# Patient Record
Sex: FEMALE | Race: WHITE | NOT HISPANIC OR LATINO | Employment: OTHER | URBAN - NONMETROPOLITAN AREA
[De-identification: names, ages, dates, MRNs, and addresses within clinical notes are randomized per-mention and may not be internally consistent; named-entity substitution may affect disease eponyms.]

---

## 2017-04-27 ENCOUNTER — APPOINTMENT (OUTPATIENT)
Dept: ULTRASOUND IMAGING | Facility: HOSPITAL | Age: 57
End: 2017-04-27
Attending: SURGERY

## 2017-04-27 ENCOUNTER — HOSPITAL ENCOUNTER (OUTPATIENT)
Dept: ULTRASOUND IMAGING | Facility: HOSPITAL | Age: 57
Discharge: HOME | End: 2017-04-27
Attending: SURGERY

## 2017-05-09 ENCOUNTER — OFFICE VISIT (OUTPATIENT)
Dept: GASTROENTEROLOGY | Facility: CLINIC | Age: 57
End: 2017-05-09

## 2017-05-09 VITALS
OXYGEN SATURATION: 99 % | HEIGHT: 69 IN | DIASTOLIC BLOOD PRESSURE: 78 MMHG | HEART RATE: 86 BPM | TEMPERATURE: 97.5 F | WEIGHT: 204 LBS | BODY MASS INDEX: 30.21 KG/M2 | SYSTOLIC BLOOD PRESSURE: 132 MMHG

## 2017-05-09 DIAGNOSIS — K59.09 CHRONIC CONSTIPATION: Primary | ICD-10-CM

## 2017-05-09 DIAGNOSIS — R10.13 EPIGASTRIC PAIN: ICD-10-CM

## 2017-05-09 DIAGNOSIS — D68.9 COAGULOPATHY (HCC): ICD-10-CM

## 2017-05-09 DIAGNOSIS — Z86.010 HX OF COLONIC POLYP: ICD-10-CM

## 2017-05-09 DIAGNOSIS — R12 HEARTBURN: ICD-10-CM

## 2017-05-09 DIAGNOSIS — Z80.0 FAMILY HX OF COLON CANCER: ICD-10-CM

## 2017-05-09 PROBLEM — Z86.0100 HX OF COLONIC POLYP: Status: ACTIVE | Noted: 2017-05-09

## 2017-05-09 PROCEDURE — 99204 OFFICE O/P NEW MOD 45 MIN: CPT | Performed by: NURSE PRACTITIONER

## 2017-05-10 ENCOUNTER — TELEPHONE (OUTPATIENT)
Dept: GASTROENTEROLOGY | Facility: CLINIC | Age: 57
End: 2017-05-10

## 2017-05-16 ENCOUNTER — OFFICE VISIT (OUTPATIENT)
Dept: VASCULAR SURGERY | Facility: CLINIC | Age: 57
End: 2017-05-16

## 2017-05-16 ENCOUNTER — HOSPITAL ENCOUNTER (OUTPATIENT)
Dept: ULTRASOUND IMAGING | Facility: HOSPITAL | Age: 57
Discharge: HOME | End: 2017-05-16
Attending: SURGERY | Admitting: SURGERY

## 2017-05-16 ENCOUNTER — HOSPITAL ENCOUNTER (OUTPATIENT)
Dept: ULTRASOUND IMAGING | Facility: HOSPITAL | Age: 57
Discharge: HOME | End: 2017-05-16
Attending: SURGERY

## 2017-05-16 VITALS
SYSTOLIC BLOOD PRESSURE: 112 MMHG | WEIGHT: 204 LBS | HEIGHT: 69 IN | DIASTOLIC BLOOD PRESSURE: 76 MMHG | HEART RATE: 96 BPM | BODY MASS INDEX: 30.21 KG/M2

## 2017-05-16 DIAGNOSIS — I73.9 PAD (PERIPHERAL ARTERY DISEASE) (HCC): Primary | ICD-10-CM

## 2017-05-16 DIAGNOSIS — I65.23 BILATERAL CAROTID ARTERY STENOSIS: ICD-10-CM

## 2017-05-16 PROCEDURE — 93880 EXTRACRANIAL BILAT STUDY: CPT

## 2017-05-16 PROCEDURE — 93880 EXTRACRANIAL BILAT STUDY: CPT | Performed by: SURGERY

## 2017-05-16 PROCEDURE — 99214 OFFICE O/P EST MOD 30 MIN: CPT | Performed by: NURSE PRACTITIONER

## 2017-05-16 PROCEDURE — 93924 LWR XTR VASC STDY BILAT: CPT | Performed by: SURGERY

## 2017-05-16 PROCEDURE — 93923 UPR/LXTR ART STDY 3+ LVLS: CPT

## 2017-05-16 RX ORDER — CILOSTAZOL 100 MG/1
100 TABLET ORAL 2 TIMES DAILY
COMMUNITY

## 2017-06-16 ENCOUNTER — ANESTHESIA EVENT (OUTPATIENT)
Dept: GASTROENTEROLOGY | Facility: HOSPITAL | Age: 57
End: 2017-06-16

## 2017-06-19 ENCOUNTER — ANESTHESIA (OUTPATIENT)
Dept: GASTROENTEROLOGY | Facility: HOSPITAL | Age: 57
End: 2017-06-19

## 2017-06-19 ENCOUNTER — HOSPITAL ENCOUNTER (OUTPATIENT)
Facility: HOSPITAL | Age: 57
Setting detail: HOSPITAL OUTPATIENT SURGERY
Discharge: HOME | End: 2017-06-19
Attending: INTERNAL MEDICINE | Admitting: INTERNAL MEDICINE

## 2017-06-19 VITALS
DIASTOLIC BLOOD PRESSURE: 71 MMHG | HEART RATE: 75 BPM | OXYGEN SATURATION: 100 % | WEIGHT: 198 LBS | RESPIRATION RATE: 20 BRPM | TEMPERATURE: 97.2 F | HEIGHT: 70 IN | BODY MASS INDEX: 28.35 KG/M2 | SYSTOLIC BLOOD PRESSURE: 136 MMHG

## 2017-06-19 DIAGNOSIS — K59.09 CHRONIC CONSTIPATION: ICD-10-CM

## 2017-06-19 DIAGNOSIS — Z86.010 HX OF COLONIC POLYP: ICD-10-CM

## 2017-06-19 DIAGNOSIS — Z80.0 FAMILY HX OF COLON CANCER: ICD-10-CM

## 2017-06-19 PROCEDURE — 25010000002 PROPOFOL 10 MG/ML EMULSION: Performed by: NURSE ANESTHETIST, CERTIFIED REGISTERED

## 2017-06-19 PROCEDURE — 43239 EGD BIOPSY SINGLE/MULTIPLE: CPT | Performed by: INTERNAL MEDICINE

## 2017-06-19 PROCEDURE — 45385 COLONOSCOPY W/LESION REMOVAL: CPT | Performed by: INTERNAL MEDICINE

## 2017-06-19 PROCEDURE — 87081 CULTURE SCREEN ONLY: CPT | Performed by: INTERNAL MEDICINE

## 2017-06-19 PROCEDURE — 88305 TISSUE EXAM BY PATHOLOGIST: CPT | Performed by: INTERNAL MEDICINE

## 2017-06-19 RX ORDER — ONDANSETRON 2 MG/ML
4 INJECTION INTRAMUSCULAR; INTRAVENOUS ONCE AS NEEDED
Status: DISCONTINUED | OUTPATIENT
Start: 2017-06-19 | End: 2017-06-19 | Stop reason: HOSPADM

## 2017-06-19 RX ORDER — SODIUM CHLORIDE 9 MG/ML
9 INJECTION, SOLUTION INTRAVENOUS CONTINUOUS PRN
Status: DISCONTINUED | OUTPATIENT
Start: 2017-06-19 | End: 2017-06-19 | Stop reason: HOSPADM

## 2017-06-19 RX ORDER — SODIUM CHLORIDE 0.9 % (FLUSH) 0.9 %
1-10 SYRINGE (ML) INJECTION AS NEEDED
Status: CANCELLED | OUTPATIENT
Start: 2017-06-19

## 2017-06-19 RX ORDER — LIDOCAINE HYDROCHLORIDE 20 MG/ML
INJECTION, SOLUTION INFILTRATION; PERINEURAL AS NEEDED
Status: DISCONTINUED | OUTPATIENT
Start: 2017-06-19 | End: 2017-06-19 | Stop reason: SURG

## 2017-06-19 RX ORDER — SODIUM CHLORIDE 9 MG/ML
100 INJECTION, SOLUTION INTRAVENOUS CONTINUOUS
Status: CANCELLED | OUTPATIENT
Start: 2017-06-19

## 2017-06-19 RX ORDER — SODIUM CHLORIDE 0.9 % (FLUSH) 0.9 %
1-10 SYRINGE (ML) INJECTION AS NEEDED
Status: DISCONTINUED | OUTPATIENT
Start: 2017-06-19 | End: 2017-06-19 | Stop reason: HOSPADM

## 2017-06-19 RX ORDER — PROPOFOL 10 MG/ML
VIAL (ML) INTRAVENOUS AS NEEDED
Status: DISCONTINUED | OUTPATIENT
Start: 2017-06-19 | End: 2017-06-19 | Stop reason: SURG

## 2017-06-19 RX ADMIN — LIDOCAINE HYDROCHLORIDE 60 MG: 20 INJECTION, SOLUTION INFILTRATION; PERINEURAL at 09:18

## 2017-06-19 RX ADMIN — PROPOFOL 30 MG: 10 INJECTION, EMULSION INTRAVENOUS at 09:43

## 2017-06-19 RX ADMIN — SODIUM CHLORIDE 9 ML/HR: 9 INJECTION, SOLUTION INTRAVENOUS at 08:39

## 2017-06-19 RX ADMIN — PROPOFOL 30 MG: 10 INJECTION, EMULSION INTRAVENOUS at 09:24

## 2017-06-19 RX ADMIN — PROPOFOL 30 MG: 10 INJECTION, EMULSION INTRAVENOUS at 09:46

## 2017-06-19 RX ADMIN — PROPOFOL 30 MG: 10 INJECTION, EMULSION INTRAVENOUS at 09:26

## 2017-06-19 RX ADMIN — PROPOFOL 30 MG: 10 INJECTION, EMULSION INTRAVENOUS at 09:33

## 2017-06-19 RX ADMIN — PROPOFOL 50 MG: 10 INJECTION, EMULSION INTRAVENOUS at 09:19

## 2017-06-19 RX ADMIN — PROPOFOL 30 MG: 10 INJECTION, EMULSION INTRAVENOUS at 09:40

## 2017-06-19 RX ADMIN — PROPOFOL 30 MG: 10 INJECTION, EMULSION INTRAVENOUS at 09:29

## 2017-06-19 RX ADMIN — PROPOFOL 30 MG: 10 INJECTION, EMULSION INTRAVENOUS at 09:36

## 2017-06-19 RX ADMIN — PROPOFOL 30 MG: 10 INJECTION, EMULSION INTRAVENOUS at 09:22

## 2017-06-19 NOTE — PLAN OF CARE
Problem: Patient Care Overview (Adult)  Goal: Adult Individualization and Mutuality    06/19/17 0825   Individualization   Patient Specific Preferences none   Patient Specific Goals none   Patient Specific Interventions none   Mutuality/Individual Preferences   What Anxieties, Fears or Concerns Do You Have About Your Health or Care? none   What Questions Do You Have About Your Health or Care? none   What Information Would Help Us Give You More Personalized Care? none

## 2017-06-19 NOTE — ANESTHESIA POSTPROCEDURE EVALUATION
Patient: Tabitha Gonzalez    Procedure Summary     Date Anesthesia Start Anesthesia Stop Room / Location    06/19/17 0913 0950 [facility] ENDOSCOPY 5 / [facility]       Procedure Diagnosis Surgeon Provider    COLONOSCOPY WITH ANESTHESIA (N/A ); ESOPHAGOGASTRODUODENOSCOPY WITH ANESTHESIA (N/A Esophagus) Chronic constipation; Family hx of colon cancer; Hx of colonic polyp  (Chronic constipation [K59.09]; Family hx of colon cancer [Z80.0]; Hx of colonic polyp [Z86.010]) MD Malcom Lloyd CRNA          Anesthesia Type: general  Last vitals  BP      Temp      Pulse     Resp      SpO2        Post Anesthesia Care and Evaluation    Patient location during evaluation: PHASE II  Patient participation: complete - patient participated  Level of consciousness: awake and alert  Pain score: 0  Pain management: adequate  Airway patency: patent  Anesthetic complications: No anesthetic complications  PONV Status: none  Cardiovascular status: acceptable and stable  Respiratory status: acceptable and unassisted  Hydration status: acceptable

## 2017-06-19 NOTE — PLAN OF CARE
Problem: GI Endoscopy (Adult)  Goal: Signs and Symptoms of Listed Potential Problems Will be Absent or Manageable (GI Endoscopy)  Outcome: Outcome(s) achieved Date Met:  06/19/17

## 2017-06-19 NOTE — PLAN OF CARE
Problem: Patient Care Overview (Adult)  Goal: Plan of Care Review  Outcome: Outcome(s) achieved Date Met:  06/19/17 06/19/17 1017   Patient Care Overview   Progress progress toward functional goals as expected   Outcome Evaluation   Outcome Summary/Follow up Plan d/c criteria met   Coping/Psychosocial Response Interventions   Plan Of Care Reviewed With patient;spouse

## 2017-06-19 NOTE — H&P
[facility]  Pre Procedure History & Physical    Chief Complaint:   Colon polyps    Subjective     HPI:   The patient has a history of colon polyps who presents for exam.  She also has been having heartburn along with epigastric burning.    Past Medical History:   Past Medical History:   Diagnosis Date   • Colon polyp    • COPD (chronic obstructive pulmonary disease)    • DVT (deep venous thrombosis)    • GERD (gastroesophageal reflux disease)    • Hypercholesterolemia    • Hypertension    • IBS (irritable bowel syndrome)    • Migraine    • Peripheral artery disease    • PONV (postoperative nausea and vomiting)    • PVD (peripheral vascular disease)        Past Surgical History:  [unfilled]    Family History:  Family History   Problem Relation Age of Onset   • Rectal cancer Sister    • Colon polyps Sister      in her 40 's    • Colon cancer Sister      rectal cancer in her 50 's   • Stomach cancer Brother    • Coronary artery disease Mother    • Cancer Father      lung       Social History:   reports that she quit smoking about 3 years ago. She has never used smokeless tobacco. She reports that she drinks alcohol. She reports that she does not use illicit drugs.    Medications:   Prior to Admission medications    Medication Sig Start Date End Date Taking? Authorizing Provider   Metoprolol Succinate (TOPROL XL PO) Take  by mouth.   Yes Historical Provider, MD   atorvastatin (LIPITOR) 20 MG tablet Take 20 mg by mouth daily.    Historical Provider, MD   cilostazol (PLETAL) 100 MG tablet Take 100 mg by mouth 2 (Two) Times a Day.    Historical Provider, MD   rivaroxaban (XARELTO) 20 MG tablet Take 20 mg by mouth daily.    Historical Provider, MD   verapamil SR (CALAN-SR) 240 MG CR tablet Take 240 mg by mouth every night.    Historical Provider, MD   folic acid-pyridoxine-cyanocobalamin (FOLBIC) 2.5-25-2 MG tablet tablet Take  by mouth daily.  6/19/17  Historical Provider, MD       Allergies:  Codeine;  "Contrast dye; and Sulfa antibiotics    Objective     Blood pressure 140/80, pulse 75, temperature 97.2 °F (36.2 °C), temperature source Temporal Artery , resp. rate 20, height 70\" (177.8 cm), weight 198 lb (89.8 kg), SpO2 98 %.    Physical Exam   Constitutional: Pt is oriented to person, place, and in no distress.   HENT: Mouth/Throat: Oropharynx is clear.   Cardiovascular: Normal rate, regular rhythm.    Pulmonary/Chest: Effort normal. No respiratory distress. No  wheezes.   Abdominal: Soft. Non-distended.  Skin: Skin is warm and dry.   Psychiatric: Mood, memory, affect and judgment appear normal.     Assessment/Plan     Diagnosis:  Colon polyps epigastric burning, heartburn     Anticipated Surgical Procedure:  Colonoscopy  Endoscopy  The risks, benefits, and alternatives of this procedure have been discussed with the patient or the responsible party- the patient understands and agrees to proceed.      EMR Dragon/transcription disclaimer:  Much of this encounter note is electronic transcription/translation of spoken language to printed text.  The electronic translation of spoken language may be erroneous, or at times, nonsensical words or phrases may be inadvertently transcribed.  Although I have reviewed the note for such errors, some may still exist.  "

## 2017-06-19 NOTE — ANESTHESIA POSTPROCEDURE EVALUATION
"Patient: Tabitha Gonzalez    Procedure Summary     Date Anesthesia Start Anesthesia Stop Room / Location    06/19/17 0913 0950  [facility] ENDOSCOPY 5 / [facility]       Procedure Diagnosis Surgeon Provider    COLONOSCOPY WITH ANESTHESIA (N/A ); ESOPHAGOGASTRODUODENOSCOPY WITH ANESTHESIA (N/A Esophagus) Chronic constipation; Family hx of colon cancer; Hx of colonic polyp  (Chronic constipation [K59.09]; Family hx of colon cancer [Z80.0]; Hx of colonic polyp [Z86.010]) MD Malcom Lloyd, ZAN          Anesthesia Type: general  Last vitals  /71 (06/19/17 1025)    Temp      Pulse 75 (06/19/17 1025)   Resp 20 (06/19/17 1025)    SpO2 100 % (06/19/17 1025)      Post Anesthesia Care and Evaluation    Patient location during evaluation: PHASE II  Patient participation: complete - patient participated  Level of consciousness: awake and alert  Pain management: adequate  Airway patency: patent  Anesthetic complications: No anesthetic complications    Cardiovascular status: acceptable  Respiratory status: acceptable  Hydration status: acceptable    Comments: Blood pressure 136/71, pulse 75, temperature 97.2 °F (36.2 °C), temperature source Temporal Artery , resp. rate 20, height 70\" (177.8 cm), weight 198 lb (89.8 kg), SpO2 100 %.      "

## 2017-06-20 LAB
CYTO UR: NORMAL
LAB AP CASE REPORT: NORMAL
LAB AP CLINICAL INFORMATION: NORMAL
Lab: NORMAL
PATH REPORT.FINAL DX SPEC: NORMAL
PATH REPORT.GROSS SPEC: NORMAL
UREASE TISS QL: NEGATIVE

## 2018-05-16 ENCOUNTER — OFFICE VISIT (OUTPATIENT)
Dept: VASCULAR SURGERY | Facility: CLINIC | Age: 58
End: 2018-05-16

## 2018-05-16 ENCOUNTER — HOSPITAL ENCOUNTER (OUTPATIENT)
Dept: ULTRASOUND IMAGING | Facility: HOSPITAL | Age: 58
Discharge: HOME | End: 2018-05-16

## 2018-05-16 ENCOUNTER — HOSPITAL ENCOUNTER (OUTPATIENT)
Dept: ULTRASOUND IMAGING | Facility: HOSPITAL | Age: 58
Discharge: HOME | End: 2018-05-16
Admitting: NURSE PRACTITIONER

## 2018-05-16 VITALS
DIASTOLIC BLOOD PRESSURE: 76 MMHG | OXYGEN SATURATION: 98 % | BODY MASS INDEX: 30.96 KG/M2 | WEIGHT: 209 LBS | HEART RATE: 93 BPM | HEIGHT: 69 IN | SYSTOLIC BLOOD PRESSURE: 124 MMHG

## 2018-05-16 DIAGNOSIS — I73.9 PAD (PERIPHERAL ARTERY DISEASE) (HCC): Primary | ICD-10-CM

## 2018-05-16 DIAGNOSIS — I73.9 PAD (PERIPHERAL ARTERY DISEASE) (HCC): ICD-10-CM

## 2018-05-16 DIAGNOSIS — I65.23 BILATERAL CAROTID ARTERY STENOSIS: ICD-10-CM

## 2018-05-16 PROCEDURE — 99213 OFFICE O/P EST LOW 20 MIN: CPT | Performed by: NURSE PRACTITIONER

## 2018-05-16 PROCEDURE — 93880 EXTRACRANIAL BILAT STUDY: CPT | Performed by: SURGERY

## 2018-05-16 PROCEDURE — 93924 LWR XTR VASC STDY BILAT: CPT | Performed by: SURGERY

## 2018-05-16 PROCEDURE — 93923 UPR/LXTR ART STDY 3+ LVLS: CPT

## 2018-05-16 PROCEDURE — 93880 EXTRACRANIAL BILAT STUDY: CPT

## 2018-05-16 NOTE — PROGRESS NOTES
"05/16/2018       Nikolai Treadwell MD  [address]  [place] [address]        Tabitha Gonzalez  1960    Chief Complaint   Patient presents with   • Follow-up     1 Year Follow Up. Test 05/16/18 US pad carotid bilateral and US pad ankle / brach ind ext comp. Patient denies any stroke like symptoms.        Dear Nikolai Treadwell MD:    HPI     I had the pleasure of seeing you patient in the office today for follow up.  As you recall, the patient is a 58 y.o. female who has a longstanding history of PAD.  She has undergone previous aortobifemoral bypass with bilateral lower extremity stenting.  She does complain of some tiredness to her lower extremities, however no classic claudication.  She does have a history of chronic back pain with bulging discs.  She denies any strokelike symptoms.  She did have noninvasive testing performed today, which I did review in office.    /76 (BP Location: Right arm, Patient Position: Sitting)   Pulse 93   Ht 175.3 cm (69\")   Wt 94.8 kg (209 lb)   SpO2 98%   BMI 30.86 kg/m²   Physical Exam   Constitutional: She is oriented to person, place, and time. She appears well-developed and well-nourished. No distress.   HENT:   Head: Normocephalic and atraumatic.   Mouth/Throat: Oropharynx is clear and moist.   Eyes: Pupils are equal, round, and reactive to light. No scleral icterus.   Neck: Normal range of motion. Neck supple. No JVD present. Carotid bruit is not present. No thyromegaly present.   Cardiovascular: Normal rate, regular rhythm, S2 normal, normal heart sounds and intact distal pulses.  Exam reveals no gallop and no friction rub.    No murmur heard.  Pulses:       Femoral pulses are 2+ on the right side, and 2+ on the left side.       Popliteal pulses are 2+ on the right side, and 2+ on the left side.        Dorsalis pedis pulses are 2+ on the right side, and 2+ on the left side.        Posterior tibial pulses are 0 on the right side, and 2+ on the left side. "   Right: doppler PT   Pulmonary/Chest: Effort normal and breath sounds normal.   Abdominal: Soft. Normal aorta and bowel sounds are normal. There is no hepatosplenomegaly.   Musculoskeletal: Normal range of motion.   Neurological: She is alert and oriented to person, place, and time. No cranial nerve deficit.   Skin: Skin is warm and dry. She is not diaphoretic.   Psychiatric: She has a normal mood and affect. Her behavior is normal. Judgment and thought content normal.   Nursing note and vitals reviewed.      DIAGNOSTIC DATA: Noninvasive testing including a carotid duplex shows less than 50% stenosis bilaterally with bilateral antegrade vertebral flow.      No results found.     Patient Active Problem List   Diagnosis   • COPD (chronic obstructive pulmonary disease)   • Hx of colonic polyp   • Chronic constipation   • Family hx of colon cancer   • Epigastric pain   • Peripheral artery disease   • Hypertension   • Hypercholesterolemia   • DVT (deep venous thrombosis)   • BMI 30.0-30.9,adult         ICD-10-CM ICD-9-CM   1. PAD (peripheral artery disease) I73.9 443.9   2. Bilateral carotid artery stenosis I65.23 433.10     433.30   3. BMI 30.0-30.9,adult Z68.30 V85.30       PLAN: After thoroughly evaluating Tabitha Gonzalez, I believe the best course of action is to remain conservative from a vascular standpoint.  We will see Tabitha Gonzalez back in 1 year with repeat noninvasive testing for continued surveillance, including ABIs and a carotid dupplex  I did discuss vascular risk factors as they pertain to the progression of vascular disease including controlling her hypertension and hyperlipidemia.  Body mass index is 30.86 kg/m². Follow up with PCP regarding abnormal BMI.  The patient is to continue taking their medications as previously discussed.   This was all discussed in full with complete understanding.  Thank you for allowing me to participate in the care of your patient.  Please do not hesitate to call  with any questions or concerns.  We will keep you aware of any further encounters with Tabitha Gonzalez.      Sincerely Yours,        SHYLA Pal

## 2019-02-25 ENCOUNTER — LAB REQUISITION (OUTPATIENT)
Dept: LAB | Facility: HOSPITAL | Age: 59
End: 2019-02-25

## 2019-02-25 DIAGNOSIS — Z00.00 ENCOUNTER FOR GENERAL ADULT MEDICAL EXAMINATION WITHOUT ABNORMAL FINDINGS: ICD-10-CM

## 2019-02-25 PROCEDURE — 87015 SPECIMEN INFECT AGNT CONCNTJ: CPT | Performed by: OPHTHALMOLOGY

## 2019-02-25 PROCEDURE — 87205 SMEAR GRAM STAIN: CPT | Performed by: OPHTHALMOLOGY

## 2019-02-25 PROCEDURE — 87070 CULTURE OTHR SPECIMN AEROBIC: CPT | Performed by: OPHTHALMOLOGY

## 2019-02-28 LAB
BACTERIA FLD CULT: NORMAL
GRAM STN SPEC: NORMAL

## 2019-04-18 ENCOUNTER — TELEPHONE (OUTPATIENT)
Dept: VASCULAR SURGERY | Facility: CLINIC | Age: 59
End: 2019-04-18

## 2019-04-18 NOTE — TELEPHONE ENCOUNTER
Left message reminding Mrs Gonzalez of her appointments for Friday, April 19th, 2019. Reminded Mrs Gonzalez to arrive at the [facility] at 730 am for testing and follow up afterwards at 10 am with Carin MANSFIELD. Also advised if she had any questions or needed to reschedule to please call the office at [phone].

## 2019-04-19 ENCOUNTER — HOSPITAL ENCOUNTER (OUTPATIENT)
Dept: ULTRASOUND IMAGING | Facility: HOSPITAL | Age: 59
Discharge: HOME | End: 2019-04-19
Admitting: NURSE PRACTITIONER

## 2019-04-19 ENCOUNTER — OFFICE VISIT (OUTPATIENT)
Dept: VASCULAR SURGERY | Facility: CLINIC | Age: 59
End: 2019-04-19

## 2019-04-19 ENCOUNTER — HOSPITAL ENCOUNTER (OUTPATIENT)
Dept: ULTRASOUND IMAGING | Facility: HOSPITAL | Age: 59
Discharge: HOME | End: 2019-04-19

## 2019-04-19 VITALS
SYSTOLIC BLOOD PRESSURE: 136 MMHG | DIASTOLIC BLOOD PRESSURE: 84 MMHG | HEIGHT: 69 IN | BODY MASS INDEX: 28.14 KG/M2 | WEIGHT: 190 LBS | OXYGEN SATURATION: 97 % | HEART RATE: 78 BPM

## 2019-04-19 DIAGNOSIS — I73.9 PAD (PERIPHERAL ARTERY DISEASE) (HCC): ICD-10-CM

## 2019-04-19 DIAGNOSIS — I65.23 BILATERAL CAROTID ARTERY STENOSIS: ICD-10-CM

## 2019-04-19 DIAGNOSIS — E78.00 HYPERCHOLESTEROLEMIA: ICD-10-CM

## 2019-04-19 DIAGNOSIS — I10 ESSENTIAL HYPERTENSION: ICD-10-CM

## 2019-04-19 DIAGNOSIS — I73.9 PAD (PERIPHERAL ARTERY DISEASE) (HCC): Primary | ICD-10-CM

## 2019-04-19 PROCEDURE — 93924 LWR XTR VASC STDY BILAT: CPT | Performed by: SURGERY

## 2019-04-19 PROCEDURE — 93880 EXTRACRANIAL BILAT STUDY: CPT | Performed by: SURGERY

## 2019-04-19 PROCEDURE — 93923 UPR/LXTR ART STDY 3+ LVLS: CPT

## 2019-04-19 PROCEDURE — 93880 EXTRACRANIAL BILAT STUDY: CPT

## 2019-04-19 PROCEDURE — 99214 OFFICE O/P EST MOD 30 MIN: CPT | Performed by: NURSE PRACTITIONER

## 2019-04-19 NOTE — PROGRESS NOTES
"4/19/2019       Nikolai Treadwell MD  [address] [facility] [place] [address]        Tabitha Gonzalez  1960    Chief Complaint   Patient presents with   • Follow-up     1 Year Follow Up For Bilateral Carotid Artery Stenosis and Peripheral Artery Disease. Test 481310 US pad ankle / brach ind ext comp and US pad carotid bilateral. Patient denies any stroke like symptoms.    • Leg Swelling     Patient states bilateral leg and ankle swelling.        Dear Nikolai Treadwell MD:    HPI     I had the pleasure of seeing you patient in the office today for follow up.  As you recall, the patient is a 59 y.o. female who has a longstanding history of PAD.  She has undergone previous aortobifemoral bypass with bilateral lower extremity stenting.  She does complain of some tiredness to her lower extremities, however no classic claudication.  She does have a history of chronic back pain with bulging discs.  She denies any strokelike symptoms.  She began having some swelling to her lower extremities bilaterally over the past couple of weeks.  She did have noninvasive testing performed today, which I did review in office.    Review of Systems   Constitutional: Negative.    HENT: Negative.    Eyes: Negative.    Respiratory: Negative.    Cardiovascular: Positive for leg swelling.   Gastrointestinal: Negative.    Endocrine: Negative.    Genitourinary: Negative.    Musculoskeletal: Negative.    Skin: Negative.    Allergic/Immunologic: Negative.    Neurological: Negative.    Hematological: Negative.    Psychiatric/Behavioral: Negative.        /84 (BP Location: Right arm, Patient Position: Sitting, Cuff Size: Adult)   Pulse 78   Ht 175.3 cm (69\")   Wt 86.2 kg (190 lb)   SpO2 97%   BMI 28.06 kg/m²   Physical Exam   Constitutional: She is oriented to person, place, and time. Vital signs are normal. She appears well-developed and well-nourished. No distress.   HENT:   Head: Normocephalic and atraumatic.   Mouth/Throat: " Oropharynx is clear and moist.   Eyes: Pupils are equal, round, and reactive to light. No scleral icterus.   Neck: Normal range of motion. Neck supple. No JVD present. Carotid bruit is not present. No thyromegaly present.   Cardiovascular: Normal rate, regular rhythm, S2 normal, normal heart sounds and intact distal pulses. Exam reveals no gallop and no friction rub.   No murmur heard.  Pulses:       Femoral pulses are 2+ on the right side, and 2+ on the left side.       Popliteal pulses are 2+ on the right side, and 2+ on the left side.        Dorsalis pedis pulses are 2+ on the right side, and 2+ on the left side.        Posterior tibial pulses are 0 on the right side, and 2+ on the left side.   Right: doppler PT   Pulmonary/Chest: Effort normal and breath sounds normal.   Abdominal: Soft. Normal aorta and bowel sounds are normal. There is no hepatosplenomegaly.   Musculoskeletal: Normal range of motion.   Neurological: She is alert and oriented to person, place, and time. No cranial nerve deficit.   Skin: Skin is warm and dry. She is not diaphoretic.   Psychiatric: She has a normal mood and affect. Her behavior is normal. Judgment and thought content normal.   Nursing note and vitals reviewed.      DIAGNOSTIC DATA: Noninvasive testing including a carotid duplex shows less than 50% stenosis bilaterally with bilateral antegrade vertebral flow.            Patient Active Problem List   Diagnosis   • COPD (chronic obstructive pulmonary disease) (CMS/HCC)   • Hx of colonic polyp   • Chronic constipation   • Family hx of colon cancer   • Epigastric pain   • Peripheral artery disease (CMS/HCC)   • Hypertension   • Hypercholesterolemia   • DVT (deep venous thrombosis) (CMS/[facility])   • BMI 30.0-30.9,adult         ICD-10-CM ICD-9-CM   1. PAD (peripheral artery disease) (CMS/HCC) I73.9 443.9   2. Bilateral carotid artery stenosis I65.23 433.10     433.30   3. Essential hypertension I10 401.9   4. Hypercholesterolemia E78.00  272.0       PLAN: After thoroughly evaluating Tabitha Gonzalez, I believe the best course of action is to remain conservative from a vascular standpoint.  I did review her testing and ABIs are unchanged.  She also remains less than 50% stenosis bilateral carotids.  We will see Tabitha Gonzalez back in 1 year with repeat noninvasive testing for continued surveillance, including ABIs and a carotid duplex.  I did recommend she could wear compression stockings to aid in her swelling and explained how to wear these on a daily basis.  She should apply these in the morning and wear them throughout the day.  I did discuss vascular risk factors as they pertain to the progression of vascular disease including controlling her hypertension and hyperlipidemia.  Body mass index is 28.06 kg/m². Follow up with PCP regarding abnormal BMI.  The patient is to continue taking their medications as previously discussed.   This was all discussed in full with complete understanding.  Thank you for allowing me to participate in the care of your patient.  Please do not hesitate to call with any questions or concerns.  We will keep you aware of any further encounters with Tabitha Gonzalez.      Sincerely Yours,        SHYLA Pal

## 2019-12-13 ENCOUNTER — TRANSCRIBE ORDERS (OUTPATIENT)
Dept: ADMINISTRATIVE | Facility: HOSPITAL | Age: 59
End: 2019-12-13

## 2019-12-13 DIAGNOSIS — R20.0 LEFT FACIAL NUMBNESS: Primary | ICD-10-CM

## 2019-12-13 DIAGNOSIS — H53.8 BLURRED VISION, LEFT EYE: ICD-10-CM

## 2019-12-13 DIAGNOSIS — R51.9 UNILATERAL HEADACHE: ICD-10-CM

## 2019-12-17 ENCOUNTER — HOSPITAL ENCOUNTER (OUTPATIENT)
Dept: ULTRASOUND IMAGING | Facility: HOSPITAL | Age: 59
Discharge: HOME | End: 2019-12-17

## 2019-12-17 ENCOUNTER — HOSPITAL ENCOUNTER (OUTPATIENT)
Dept: MRI IMAGING | Facility: HOSPITAL | Age: 59
Discharge: HOME | End: 2019-12-17
Admitting: PHYSICIAN ASSISTANT

## 2019-12-17 DIAGNOSIS — R20.0 LEFT FACIAL NUMBNESS: ICD-10-CM

## 2019-12-17 DIAGNOSIS — H53.8 BLURRED VISION, LEFT EYE: ICD-10-CM

## 2019-12-17 DIAGNOSIS — R51.9 UNILATERAL HEADACHE: ICD-10-CM

## 2019-12-17 LAB — CREAT BLDA-MCNC: 1.1 MG/DL (ref 0.6–1.3)

## 2019-12-17 PROCEDURE — 0 GADOBENATE DIMEGLUMINE 529 MG/ML SOLUTION: Performed by: PHYSICIAN ASSISTANT

## 2019-12-17 PROCEDURE — A9577 INJ MULTIHANCE: HCPCS | Performed by: PHYSICIAN ASSISTANT

## 2019-12-17 PROCEDURE — 82565 ASSAY OF CREATININE: CPT

## 2019-12-17 PROCEDURE — 70553 MRI BRAIN STEM W/O & W/DYE: CPT

## 2019-12-17 PROCEDURE — 93880 EXTRACRANIAL BILAT STUDY: CPT | Performed by: SURGERY

## 2019-12-17 PROCEDURE — 93880 EXTRACRANIAL BILAT STUDY: CPT

## 2019-12-17 RX ADMIN — GADOBENATE DIMEGLUMINE 17 ML: 529 INJECTION, SOLUTION INTRAVENOUS at 15:31

## 2020-02-04 ENCOUNTER — OFFICE VISIT (OUTPATIENT)
Dept: NEUROSURGERY | Age: 60
End: 2020-02-04
Payer: COMMERCIAL

## 2020-02-04 VITALS
HEIGHT: 69 IN | HEART RATE: 92 BPM | BODY MASS INDEX: 31.7 KG/M2 | SYSTOLIC BLOOD PRESSURE: 113 MMHG | WEIGHT: 214 LBS | DIASTOLIC BLOOD PRESSURE: 78 MMHG

## 2020-02-04 DIAGNOSIS — R51.9 NONINTRACTABLE HEADACHE, UNSPECIFIED CHRONICITY PATTERN, UNSPECIFIED HEADACHE TYPE: ICD-10-CM

## 2020-02-04 LAB
C-REACTIVE PROTEIN: 0.14 MG/DL (ref 0–0.5)
SEDIMENTATION RATE, ERYTHROCYTE: 10 MM/HR (ref 0–25)

## 2020-02-04 PROCEDURE — 99204 OFFICE O/P NEW MOD 45 MIN: CPT | Performed by: NURSE PRACTITIONER

## 2020-02-04 RX ORDER — ATORVASTATIN CALCIUM 20 MG/1
20 TABLET, FILM COATED ORAL DAILY
COMMUNITY

## 2020-02-04 RX ORDER — CILOSTAZOL 100 MG/1
100 TABLET ORAL 2 TIMES DAILY
COMMUNITY

## 2020-02-04 RX ORDER — VERAPAMIL HYDROCHLORIDE 240 MG/1
240 TABLET, FILM COATED, EXTENDED RELEASE ORAL NIGHTLY
COMMUNITY

## 2020-02-04 SDOH — HEALTH STABILITY: MENTAL HEALTH: HOW OFTEN DO YOU HAVE A DRINK CONTAINING ALCOHOL?: NEVER

## 2020-02-04 ASSESSMENT — ENCOUNTER SYMPTOMS
EYES NEGATIVE: 1
RESPIRATORY NEGATIVE: 1
GASTROINTESTINAL NEGATIVE: 1

## 2020-02-04 NOTE — PROGRESS NOTES
BLE  [x]Sensation intact to light touch, pin prick, vibration, and proprioception BUE  COMMENTS:   Coordination [x]FTN normal bilaterally   [x]HTS normal bilaterally  [x]BENJY normal bilaterally. COMMENTS:   Reflexes  [x]Symmetric and non-pathological  [x]Toes down going bilaterally  [x]No clonus present  COMMENTS:   Gait                  [x]Normal steady gait    []Ataxic    []Spastic     []Magnetic     []Shuffling  COMMENTS:       LABS RECORD AND IMAGING REVIEW (As below and per HPI)    No results found for: [id number]  No results found for: WBC, HGB, HCT, MCV, PLT  No results found for: NA, K, CL, CO2, BUN, CREATININE, GLUCOSE, CALCIUM, PROT, LABALBU, BILITOT, ALKPHOS, AST, ALT, LABGLOM, GFRAA, AGRATIO, GLOB  No results found for: CHOL, TRIG, HDL, LDLCALC  No results found for: TSH, T4FREE  Lab Results   Component Value Date    CRP 0.14 02/04/2020    SEDRATE 10 02/04/2020        Ct Head Wo Contrast    Result Date: 10/21/2016  Patient. [place] [address] [address] Examination. CT HEAD WO CONTRAST History: The patient has dizzy spells and blurred vision. The CT scan of the head is performed without intravenous contrast enhancement. The images are acquired in axial plane with subsequent reconstruction in coronal and sagittal planes. There is no previous study for comparison. There is no evidence of mass or midline shift. The ventricles, the cisterns and the cortical sulci are normal. There is no evidence of intracranial hemorrhage or hematoma. There is normal gray and white matter differentiation. The images reviewed in bone window show no acute bony abnormality. The bony orbits bilaterally appear normal. The visualized paranasal sinuses and ben-mastoid structures bilaterally are normal. Impression. No acute intracranial abnormality. Dictated on 10/21/2016 12:41 PM EST.  Signed by Dr Pastora Barrera on 10/21/2016 12:45 PM EST Signed by Dr Pastora Barrera  on 10/21/2016 11:45    MRI brain (12/2019)- stable non enhancing FLAIR signal changes since 2015 in the periventricular, left pericallosal and subcortical white matter. Carotid artery u/s (2019)- less than 50% stenosis in the right and left internal carotid arteries; antegrade flow demonstrated in bilateral vertebral arteries     Reviewed referral records     ASSESSMENT:    Dacia Palacios is a 56y.o. year old female here for evaluation of abnormal MRI Brain, left sided facial numbness, dizziness and headache. MRI brain with white matter lesions in the periventricular, left pericallosal and subcortical white matter. Could be related to  but doesn't have the co-morbidities typically associated with this. Will have patient bring MRI disc to review images, may need to consider LP pending review of these. Will also plan for MRA head and lab work today. Tremor is most consistent with essential tremor, no clear parkinsonisms noted. Discussed adding Primidone today but would need to check with vascular prior to doing this given prior history/on Xarelto. Patient declines this for now. Hesitant to add Propranolol given that she is already on Verapamil. ICD-10-CM    1. Abnormal finding on MRI of brain R90.89 MRA Head WO Contrast   2. Dizziness R42 MRA Head WO Contrast   3. Facial numbness R20.0 MRA Head WO Contrast   4. Nonintractable headache, unspecified chronicity pattern, unspecified headache type R51 Sedimentation Rate     C-Reactive Protein       PLAN:  1. MRA head   2. Lab work as listed above   3. Patient will bring MRI brain for review   4. Discussed Primidone but wants to hold off on this for now, will need to consult with vascular prior to starting this   5. Return in about 6 weeks (around 3/17/2020) for follow up, sooner if worsening. SKY Rios    Note:  A total of >50% (>23 minutes) of 45 minutes was spent discussing the pathophysiology and treatment and/or coordination of care of the above diagnoses.     This dictation was generated by voice

## 2020-02-06 ENCOUNTER — TELEPHONE (OUTPATIENT)
Dept: NEUROSURGERY | Age: 60
End: 2020-02-06

## 2020-02-06 NOTE — TELEPHONE ENCOUNTER
Edith requests that office return their call. The best time to reach her is Anytime. Patient is needing orders for MRI, she wants to have them done outside of [facility]. Thank you.

## 2020-02-25 ENCOUNTER — TELEPHONE (OUTPATIENT)
Dept: NEUROLOGY | Age: 60
End: 2020-02-25

## 2020-03-23 ENCOUNTER — TELEPHONE (OUTPATIENT)
Dept: VASCULAR SURGERY | Facility: CLINIC | Age: 60
End: 2020-03-23

## 2020-07-02 ENCOUNTER — APPOINTMENT (OUTPATIENT)
Dept: ULTRASOUND IMAGING | Facility: HOSPITAL | Age: 60
End: 2020-07-02

## 2020-07-22 ENCOUNTER — TELEPHONE (OUTPATIENT)
Dept: VASCULAR SURGERY | Facility: CLINIC | Age: 60
End: 2020-07-22

## 2020-07-22 NOTE — TELEPHONE ENCOUNTER
Spoke with Mrs Gonzalez reminding her of her appointments for Thursday, July 23rd, 2020. [facility] and follow up afterwards at 945 am with Dr Young. Mrs Gonzalez confirmed she would be here.

## 2020-07-23 ENCOUNTER — OFFICE VISIT (OUTPATIENT)
Dept: VASCULAR SURGERY | Facility: CLINIC | Age: 60
End: 2020-07-23

## 2020-07-23 ENCOUNTER — HOSPITAL ENCOUNTER (OUTPATIENT)
Dept: ULTRASOUND IMAGING | Facility: HOSPITAL | Age: 60
Discharge: HOME | End: 2020-07-23

## 2020-07-23 ENCOUNTER — HOSPITAL ENCOUNTER (OUTPATIENT)
Dept: ULTRASOUND IMAGING | Facility: HOSPITAL | Age: 60
Discharge: HOME | End: 2020-07-23
Admitting: NURSE PRACTITIONER

## 2020-07-23 VITALS
SYSTOLIC BLOOD PRESSURE: 124 MMHG | HEART RATE: 80 BPM | OXYGEN SATURATION: 96 % | DIASTOLIC BLOOD PRESSURE: 70 MMHG | WEIGHT: 221 LBS | HEIGHT: 69 IN | BODY MASS INDEX: 32.73 KG/M2

## 2020-07-23 DIAGNOSIS — I65.23 BILATERAL CAROTID ARTERY STENOSIS: ICD-10-CM

## 2020-07-23 DIAGNOSIS — I73.9 PAD (PERIPHERAL ARTERY DISEASE) (HCC): Primary | ICD-10-CM

## 2020-07-23 DIAGNOSIS — E78.00 HYPERCHOLESTEROLEMIA: ICD-10-CM

## 2020-07-23 DIAGNOSIS — I73.9 PAD (PERIPHERAL ARTERY DISEASE) (HCC): ICD-10-CM

## 2020-07-23 DIAGNOSIS — I10 ESSENTIAL HYPERTENSION: ICD-10-CM

## 2020-07-23 PROCEDURE — 93880 EXTRACRANIAL BILAT STUDY: CPT

## 2020-07-23 PROCEDURE — 93923 UPR/LXTR ART STDY 3+ LVLS: CPT | Performed by: SURGERY

## 2020-07-23 PROCEDURE — 93880 EXTRACRANIAL BILAT STUDY: CPT | Performed by: SURGERY

## 2020-07-23 PROCEDURE — 99214 OFFICE O/P EST MOD 30 MIN: CPT | Performed by: SURGERY

## 2020-07-23 PROCEDURE — 93923 UPR/LXTR ART STDY 3+ LVLS: CPT

## 2020-07-23 NOTE — PROGRESS NOTES
"7/23/2020       Nikolai Treadwell MD  [address] [place] [address]        Tabitha Gonzalez  1960    Chief Complaint   Patient presents with   • Follow-up     1 Year Follow Up For Peripheral Artery Disease and Bilateral Carotid Artery Stenosis. Test [id number] US pad ankle / brach ind ext comp and US pad carotid bilateral. Patient denies any stroke like symptoms.    • Med Management     Verbally verified medications with patient. Mrs Gonzalez confirmed all medications are correct and up to date. Mrs Gonzalez states does not take an 81 mg Aspirin Daily.        Dear Nikolai Treadwell MD:    HPI     I had the pleasure of seeing you patient in the office today for follow up.  As you recall, the patient is a 60 y.o. female who has a longstanding history of PAD.  She has undergone previous aortobifemoral bypass with bilateral lower extremity stenting by Dr. Clark.  She does have a history of chronic back pain with bulging discs.  She denies any strokelike symptoms.  She began having some swelling to her lower extremities bilaterally over the past couple of weeks.  She states her legs are tired. She did have noninvasive testing performed today, which I did review in office.    Review of Systems   Constitutional: Negative.    HENT: Negative.    Eyes: Negative.    Respiratory: Negative.    Cardiovascular: Positive for leg swelling.   Gastrointestinal: Negative.    Endocrine: Negative.    Genitourinary: Negative.    Musculoskeletal: Negative.    Skin: Negative.    Allergic/Immunologic: Negative.    Neurological: Negative.    Hematological: Negative.    Psychiatric/Behavioral: Negative.        /70 (BP Location: Right arm, Patient Position: Sitting, Cuff Size: Adult)   Pulse 80   Ht 175.3 cm (69\")   Wt 100 kg (221 lb)   SpO2 96%   BMI 32.64 kg/m²   Physical Exam   Constitutional: She is oriented to person, place, and time. Vital signs are normal. She appears well-developed and well-nourished. No distress. "   HENT:   Head: Normocephalic and atraumatic.   Mouth/Throat: Oropharynx is clear and moist.   Eyes: Pupils are equal, round, and reactive to light. No scleral icterus.   Neck: Normal range of motion. Neck supple. No JVD present. Carotid bruit is not present. No thyromegaly present.   Cardiovascular: Normal rate, regular rhythm, S2 normal, normal heart sounds and intact distal pulses. Exam reveals no gallop and no friction rub.   No murmur heard.  Pulses:       Femoral pulses are 2+ on the right side, and 2+ on the left side.       Popliteal pulses are 2+ on the right side, and 2+ on the left side.        Dorsalis pedis pulses are 2+ on the right side, and 2+ on the left side.        Posterior tibial pulses are 0 on the right side, and 2+ on the left side.   Right: doppler PT   Pulmonary/Chest: Effort normal and breath sounds normal.   Abdominal: Soft. Normal aorta and bowel sounds are normal. There is no hepatosplenomegaly.   Musculoskeletal: Normal range of motion.   Neurological: She is alert and oriented to person, place, and time. No cranial nerve deficit.   Skin: Skin is warm and dry. She is not diaphoretic.   Psychiatric: She has a normal mood and affect. Her behavior is normal. Judgment and thought content normal.   Nursing note and vitals reviewed.      DIAGNOSTIC DATA:    Noninvasive testing including a carotid duplex shows less than 50% carotid stenosis bilaterally with bilateral antegrade vertebral flow.           Patient Active Problem List   Diagnosis   • COPD (chronic obstructive pulmonary disease) (CMS/[facility])   • Hx of colonic polyp   • Chronic constipation   • Family hx of colon cancer   • Epigastric pain   • Peripheral artery disease (CMS/[facility])   • Hypertension   • Hypercholesterolemia   • DVT (deep venous thrombosis) (CMS/[facility])   • BMI 30.0-30.9,adult         ICD-10-CM ICD-9-CM   1. PAD (peripheral artery disease) (CMS/HCC) I73.9 443.9   2. Bilateral carotid artery stenosis I65.23 433.10     433.30    3. Essential hypertension I10 401.9   4. Hypercholesterolemia E78.00 272.0       PLAN: After thoroughly evaluating Tabitha Gonzalez, I believe the best course of action is to remain conservative from a vascular standpoint.  I did review her testing and ABIs have dropped a little.  She states that her symptoms are exactly the same as her previous visit.  The waveforms are also the same.  She also remains less than 50% stenosis bilateral carotids.  We will see Tabitha Gonzalez back in 1 year with repeat noninvasive testing for continued surveillance, including ABIs and a carotid duplex.  I did recommend she could wear compression stockings to aid in her swelling and explained how to wear these on a daily basis.  She should apply these in the morning and wear them throughout the day.  I did discuss vascular risk factors as they pertain to the progression of vascular disease including controlling her hypertension and hyperlipidemia.  These risk factors are currently stable.  Body mass index is 32.64 kg/m². Follow up with PCP regarding abnormal BMI.  The patient is to continue taking their medications as previously discussed.   This was all discussed in full with complete understanding.  Thank you for allowing me to participate in the care of your patient.  Please do not hesitate to call with any questions or concerns.  We will keep you aware of any further encounters with Tabitha Gonzalez.      Sincerely Yours,        Tony Young, DO

## 2020-09-03 ENCOUNTER — TELEPHONE (OUTPATIENT)
Dept: GASTROENTEROLOGY | Facility: CLINIC | Age: 60
End: 2020-09-03

## 2020-09-03 NOTE — TELEPHONE ENCOUNTER
PT IS PAST DUE FOR A REPEAT COLONOSCOPY. LEFT VM TO HAVE PT CALL TO MAKE AN OV APPT.     LETTER SENT TO PCP.

## 2020-09-30 ENCOUNTER — OFFICE VISIT (OUTPATIENT)
Dept: GASTROENTEROLOGY | Facility: CLINIC | Age: 60
End: 2020-09-30

## 2020-09-30 VITALS
HEIGHT: 69 IN | SYSTOLIC BLOOD PRESSURE: 118 MMHG | BODY MASS INDEX: 32.44 KG/M2 | OXYGEN SATURATION: 97 % | TEMPERATURE: 97.3 F | HEART RATE: 86 BPM | WEIGHT: 219 LBS | DIASTOLIC BLOOD PRESSURE: 76 MMHG

## 2020-09-30 DIAGNOSIS — Z86.010 HISTORY OF ADENOMATOUS POLYP OF COLON: ICD-10-CM

## 2020-09-30 DIAGNOSIS — Z80.0 FAMILY HX OF COLON CANCER: ICD-10-CM

## 2020-09-30 DIAGNOSIS — K59.09 CHRONIC CONSTIPATION: Primary | ICD-10-CM

## 2020-09-30 DIAGNOSIS — D68.9 COAGULOPATHY (HCC): ICD-10-CM

## 2020-09-30 PROBLEM — Z86.0101 HISTORY OF ADENOMATOUS POLYP OF COLON: Status: ACTIVE | Noted: 2017-05-09

## 2020-09-30 PROCEDURE — 99204 OFFICE O/P NEW MOD 45 MIN: CPT | Performed by: NURSE PRACTITIONER

## 2020-09-30 NOTE — PROGRESS NOTES
[facility] Gastroenterology    Primary Physician Nikolai Treadwell MD    9/30/2020    Tabitha Gonzalez   1960      Chief Complaint   Patient presents with   • Colon Cancer Screening     Pt presents today for colon recall-last colon was 6/19/2017; Personal history of adenomatous polyps; Family history of colon cancer and colon polyps       Subjective     HPI    Tabitha Gonzalez is a 60 y.o. female who presents as a referral for preventative maintenance. She has no complaints of nausea or vomiting. No wt loss. No BRBPR. No melena. No abdominal pain.     Chronic constipation  For several years. This is getting worse x 2 months. Has bm once every 2 weeks if does not take a laxative. Takes laxative/dulcolax several days and helps at times. Has used miralax 2 doses per day and did help some. Does not drink a lot of water. No fiber supplement. Does not exercise daily. Denies thyroid problems.           Last colonoscopy was June 2017 noting 2 nonbleeding AVMs in 4 polyps (path tubular adenomatous).  The patient does have history of colon polyps. The patient does not have history of colon cancer.   There is family history of colon polyps sister. There is family history of rectal cancer sister.     She is on Xarelto as well as Pletal. States has been on blood thinners since had vascular surgery. She is followed by Dr. Young.     Past Medical History:   Diagnosis Date   • AVM (arteriovenous malformation) of colon    • Colon polyp    • COPD (chronic obstructive pulmonary disease) (CMS/HCC)    • DVT (deep venous thrombosis) (CMS/HCC)    • Family history of colon cancer    • Family history of colonic polyps    • GERD (gastroesophageal reflux disease)    • History of adenomatous polyp of colon    • Hypercholesterolemia    • Hypertension    • IBS (irritable bowel syndrome)    • Migraine    • Peripheral artery disease (CMS/HCC)    • PONV (postoperative nausea and vomiting)    • PVD (peripheral vascular disease)  (CMS/[facility])        Past Surgical History:   Procedure Laterality Date   • AORTA FEMORAL BYPASS     • APPENDECTOMY     • CHOLECYSTECTOMY     • COLONOSCOPY  10/28/2011   • COLONOSCOPY N/A 2017    Procedure: COLONOSCOPY WITH ANESTHESIA;  Surgeon: Zack Do MD;  Location: [facility] ENDOSCOPY;  Service:    • ENDOSCOPY  2007   • ENDOSCOPY N/A 2017    Procedure: ESOPHAGOGASTRODUODENOSCOPY WITH ANESTHESIA;  Surgeon: Zack Do MD;  Location: [facility] ENDOSCOPY;  Service:    • HYSTERECTOMY     • PERIPHERAL ARTERIAL STENT GRAFT         Outpatient Medications Marked as Taking for the 20 encounter (Office Visit) with Eliana Shah APRN   Medication Sig Dispense Refill   • atorvastatin (LIPITOR) 20 MG tablet Take 20 mg by mouth daily.     • cilostazol (PLETAL) 100 MG tablet Take 100 mg by mouth 2 (Two) Times a Day.     • metoprolol succinate XL (Toprol XL) 50 MG 24 hr tablet Take 50 mg by mouth Daily.     • rivaroxaban (XARELTO) 20 MG tablet Take 20 mg by mouth daily.     • verapamil SR (CALAN-SR) 240 MG CR tablet Take 240 mg by mouth every night.         Allergies   Allergen Reactions   • Contrast Dye Shortness Of Breath and Swelling   • Codeine Hives and Itching   • Sulfa Antibiotics Hives and Itching       Social History     Socioeconomic History   • Marital status:      Spouse name: Not on file   • Number of children: Not on file   • Years of education: Not on file   • Highest education level: Not on file   Tobacco Use   • Smoking status: Former Smoker     Quit date:      Years since quittin.7   • Smokeless tobacco: Never Used   Substance and Sexual Activity   • Alcohol use: Yes     Comment: Rarely    • Drug use: No   • Sexual activity: Defer       Family History   Problem Relation Age of Onset   • Rectal cancer Sister         In her 50's   • Colon polyps Sister         in her 40 's    • Colon cancer Sister         rectal cancer in her 50 's   • Stomach cancer Brother    •  Coronary artery disease Mother    • Cancer Father         lung   • Esophageal cancer Neg Hx    • Liver cancer Neg Hx    • Liver disease Neg Hx        Review of Systems   Constitutional: Negative for appetite change, chills, fatigue, fever and unexpected weight change.   HENT: Negative for sore throat and trouble swallowing.    Eyes: Negative for visual disturbance.   Respiratory: Negative for cough, shortness of breath and wheezing.    Cardiovascular: Negative for chest pain and palpitations.   Gastrointestinal: Positive for constipation. Negative for abdominal distention, abdominal pain, anal bleeding, blood in stool, diarrhea, nausea and vomiting.        As mentioned in hpi   Genitourinary: Negative for difficulty urinating and hematuria.   Musculoskeletal: Negative for arthralgias and back pain.   Skin: Negative for color change and rash.   Neurological: Negative for dizziness, seizures, syncope, light-headedness and headaches.   Hematological: Negative for adenopathy.   Psychiatric/Behavioral: Negative for confusion. The patient is not nervous/anxious.        Objective     Vitals:    09/30/20 0853   BP: 118/76   Pulse: 86   Temp: 97.3 °F (36.3 °C)   SpO2: 97%         09/30/20  0853   Weight: 99.3 kg (219 lb)     Body mass index is 32.34 kg/m².    Physical Exam  Vitals signs reviewed.   Constitutional:       General: She is not in acute distress.     Appearance: She is well-developed.   HENT:      Head: Normocephalic and atraumatic.   Eyes:      General: No scleral icterus.  Neck:      Musculoskeletal: Neck supple.      Vascular: No JVD.   Cardiovascular:      Rate and Rhythm: Normal rate and regular rhythm.      Heart sounds: Normal heart sounds.   Pulmonary:      Effort: Pulmonary effort is normal.      Breath sounds: Normal breath sounds.   Abdominal:      General: Bowel sounds are normal. There is no distension.      Palpations: Abdomen is soft.      Tenderness: There is no abdominal tenderness.      Musculoskeletal: Normal range of motion.      Right lower leg: No edema.      Left lower leg: No edema.   Skin:     General: Skin is warm and dry.      Findings: No rash.   Neurological:      Mental Status: She is alert.      Comments: Oriented   Psychiatric:         Behavior: Behavior normal.         Imaging Results (Most Recent)     None          Assessment/Plan     Tabitha was seen today for colon cancer screening.    Diagnoses and all orders for this visit:    Chronic constipation  Comments:  worsening    History of adenomatous polyp of colon    Family hx of colon cancer    Coagulopathy (CMS/HCC)  Comments:  Xarelto and Pletal      In regards to worsening chronic constipation, I did recommend increase daily water intake, recommended daily fiber supplement, and increased exercise.  Also recommended starting back on MiraLAX and we discussed increasing the dose.  She verbalizes understanding.    She is due for a colonoscopy however will need to send a letter to her vascular surgeon asking if patient could hold Xarelto for 24 hours and Pletal 5 days prior to procedure.  I will contact her once they have responded.       Body mass index is 32.34 kg/m².    Patient's Body mass index is 32.34 kg/m². BMI is above normal parameters. Recommendations include: recommend weight loss, no f/u .    Colonoscopy  All risks, benefits, alternatives, and indications of colonoscopy procedure have been discussed with the patient. Risks to include perforation of the colon requiring possible surgery or colostomy, risk of bleeding from biopsies or removal of colon tissue, possibility of missing a colon polyp or cancer, or adverse drug reaction.  Benefits to include the diagnosis and management of disease of the colon and rectum. Alternatives to include barium enema, radiographic evaluation, lab testing or no intervention. Pt verbalizes understanding and agrees.     The patient was advised on the risks of stopping blood thinners for a  procedure.  The risks discussed included the risk of developing myocardial infarction, CVA, embolus, clogging of the arteries or stents, etc.  We discussed the potential consequences of the risks discussed.  The benefits of stopping as well as the alternatives were discussed as well.   Patient is to hold their anticoagulation medication per the direction of their prescribing physician, Dr. Young.    A letter will be sent to prescribing This is to prevent any risk or complication from bleeding intra and post procedure. If they develop bleeding post procedure they are to go the emergency department for further evaluation and treatment immediately.             SHYLA Hines      EMR Dragon/transcription disclaimer:  Much of this encounter note is electronic transcription/translation of spoken language to printed text.  The electronic translation of spoken language may be erroneous, or at times, nonsensical words or phrases may be inadvertently transcribed.  Although I have reviewed the note for such errors, some may still exist.

## 2020-10-30 ENCOUNTER — TELEPHONE (OUTPATIENT)
Dept: GASTROENTEROLOGY | Facility: CLINIC | Age: 60
End: 2020-10-30

## 2020-10-30 NOTE — TELEPHONE ENCOUNTER
Please let patient know that Dr. Treadwell says ok to hold xarelto 48 hours prior to procedure and he recommends lovenox injections. His office has tried to contact patient and has sent letter with instructions. Please see if you can get in touch with the patient and schedule colonoscopy. Let me know when you get in touch with the patient  then I can put in case request. Also the last dose of lovenox is the am the day prior to the procedure, make sure patient understands. Thank you

## 2020-11-19 ENCOUNTER — PREP FOR SURGERY (OUTPATIENT)
Dept: OTHER | Facility: HOSPITAL | Age: 60
End: 2020-11-19

## 2020-11-19 DIAGNOSIS — Z80.0 FAMILY HISTORY OF COLON CANCER: ICD-10-CM

## 2020-11-19 DIAGNOSIS — Z86.010 HISTORY OF ADENOMATOUS POLYP OF COLON: Primary | ICD-10-CM

## 2020-11-24 ENCOUNTER — TRANSCRIBE ORDERS (OUTPATIENT)
Dept: LAB | Facility: HOSPITAL | Age: 60
End: 2020-11-24

## 2020-11-24 DIAGNOSIS — Z01.818 PRE-OP TESTING: Primary | ICD-10-CM

## 2020-11-30 ENCOUNTER — LAB (OUTPATIENT)
Dept: LAB | Facility: HOSPITAL | Age: 60
End: 2020-11-30

## 2020-11-30 PROCEDURE — C9803 HOPD COVID-19 SPEC COLLECT: HCPCS | Performed by: INTERNAL MEDICINE

## 2020-11-30 PROCEDURE — U0003 INFECTIOUS AGENT DETECTION BY NUCLEIC ACID (DNA OR RNA); SEVERE ACUTE RESPIRATORY SYNDROME CORONAVIRUS 2 (SARS-COV-2) (CORONAVIRUS DISEASE [COVID-19]), AMPLIFIED PROBE TECHNIQUE, MAKING USE OF HIGH THROUGHPUT TECHNOLOGIES AS DESCRIBED BY CMS-2020-01-R: HCPCS | Performed by: INTERNAL MEDICINE

## 2020-12-02 LAB
COVID LABCORP PRIORITY: NORMAL
SARS-COV-2 RNA RESP QL NAA+PROBE: NOT DETECTED

## 2020-12-03 ENCOUNTER — ANESTHESIA EVENT (OUTPATIENT)
Dept: GASTROENTEROLOGY | Facility: HOSPITAL | Age: 60
End: 2020-12-03

## 2020-12-03 ENCOUNTER — HOSPITAL ENCOUNTER (OUTPATIENT)
Facility: HOSPITAL | Age: 60
Setting detail: HOSPITAL OUTPATIENT SURGERY
Discharge: HOME | End: 2020-12-03
Attending: INTERNAL MEDICINE | Admitting: INTERNAL MEDICINE

## 2020-12-03 ENCOUNTER — ANESTHESIA (OUTPATIENT)
Dept: GASTROENTEROLOGY | Facility: HOSPITAL | Age: 60
End: 2020-12-03

## 2020-12-03 VITALS
HEIGHT: 69 IN | HEART RATE: 77 BPM | BODY MASS INDEX: 32.29 KG/M2 | SYSTOLIC BLOOD PRESSURE: 141 MMHG | DIASTOLIC BLOOD PRESSURE: 77 MMHG | WEIGHT: 218 LBS | OXYGEN SATURATION: 97 % | RESPIRATION RATE: 17 BRPM | TEMPERATURE: 96.9 F

## 2020-12-03 DIAGNOSIS — Z80.0 FAMILY HISTORY OF COLON CANCER: ICD-10-CM

## 2020-12-03 DIAGNOSIS — Z86.010 HISTORY OF ADENOMATOUS POLYP OF COLON: ICD-10-CM

## 2020-12-03 PROCEDURE — 88305 TISSUE EXAM BY PATHOLOGIST: CPT | Performed by: INTERNAL MEDICINE

## 2020-12-03 PROCEDURE — 45385 COLONOSCOPY W/LESION REMOVAL: CPT | Performed by: INTERNAL MEDICINE

## 2020-12-03 PROCEDURE — 25010000002 PROPOFOL 10 MG/ML EMULSION: Performed by: NURSE ANESTHETIST, CERTIFIED REGISTERED

## 2020-12-03 RX ORDER — SODIUM CHLORIDE 0.9 % (FLUSH) 0.9 %
10 SYRINGE (ML) INJECTION AS NEEDED
Status: CANCELLED | OUTPATIENT
Start: 2020-12-03

## 2020-12-03 RX ORDER — SODIUM CHLORIDE 0.9 % (FLUSH) 0.9 %
10 SYRINGE (ML) INJECTION EVERY 12 HOURS SCHEDULED
Status: CANCELLED | OUTPATIENT
Start: 2020-12-03

## 2020-12-03 RX ORDER — SODIUM CHLORIDE 9 MG/ML
500 INJECTION, SOLUTION INTRAVENOUS CONTINUOUS PRN
Status: DISCONTINUED | OUTPATIENT
Start: 2020-12-03 | End: 2020-12-03 | Stop reason: HOSPADM

## 2020-12-03 RX ORDER — SODIUM CHLORIDE 0.9 % (FLUSH) 0.9 %
10 SYRINGE (ML) INJECTION AS NEEDED
Status: DISCONTINUED | OUTPATIENT
Start: 2020-12-03 | End: 2020-12-03 | Stop reason: HOSPADM

## 2020-12-03 RX ORDER — SODIUM CHLORIDE 9 MG/ML
100 INJECTION, SOLUTION INTRAVENOUS CONTINUOUS
Status: CANCELLED | OUTPATIENT
Start: 2020-12-03

## 2020-12-03 RX ORDER — ONDANSETRON 2 MG/ML
4 INJECTION INTRAMUSCULAR; INTRAVENOUS ONCE AS NEEDED
Status: DISCONTINUED | OUTPATIENT
Start: 2020-12-03 | End: 2020-12-03 | Stop reason: HOSPADM

## 2020-12-03 RX ORDER — PROPOFOL 10 MG/ML
VIAL (ML) INTRAVENOUS AS NEEDED
Status: DISCONTINUED | OUTPATIENT
Start: 2020-12-03 | End: 2020-12-03 | Stop reason: SURG

## 2020-12-03 RX ORDER — LIDOCAINE HYDROCHLORIDE 10 MG/ML
0.5 INJECTION, SOLUTION EPIDURAL; INFILTRATION; INTRACAUDAL; PERINEURAL ONCE AS NEEDED
Status: DISCONTINUED | OUTPATIENT
Start: 2020-12-03 | End: 2020-12-03 | Stop reason: HOSPADM

## 2020-12-03 RX ADMIN — PROPOFOL 80 MG: 10 INJECTION, EMULSION INTRAVENOUS at 10:35

## 2020-12-03 RX ADMIN — SODIUM CHLORIDE 500 ML: 9 INJECTION, SOLUTION INTRAVENOUS at 09:58

## 2020-12-03 RX ADMIN — PROPOFOL 80 MG: 10 INJECTION, EMULSION INTRAVENOUS at 10:42

## 2020-12-03 RX ADMIN — PROPOFOL 80 MG: 10 INJECTION, EMULSION INTRAVENOUS at 10:38

## 2020-12-03 RX ADMIN — PROPOFOL 50 MG: 10 INJECTION, EMULSION INTRAVENOUS at 10:52

## 2020-12-03 NOTE — ANESTHESIA POSTPROCEDURE EVALUATION
Patient: Tabitha Gonzalez    Procedure Summary     Date: 12/03/20 Room / Location: [facility] ENDOSCOPY 2 /  [facility] END[facility]    Anesthesia Start: 1031 Anesthesia Stop: 1103    Procedure: COLONOSCOPY WITH ANESTHESIA (N/A ) Diagnosis:       History of adenomatous polyp of colon      Family history of colon cancer      (History of adenomatous polyp of colon [Z86.010])      (Family history of colon cancer [Z80.0])    Surgeon: Zack Do MD Provider: Malcom France CRNA    Anesthesia Type: MAC ASA Status: 3          Anesthesia Type: MAC    Vitals  No vitals data found for the desired time range.          Post Anesthesia Care and Evaluation    Patient location during evaluation: PHASE II  Patient participation: complete - patient participated  Level of consciousness: awake  Pain score: 0  Pain management: adequate  Airway patency: patent  Anesthetic complications: No anesthetic complications  PONV Status: none  Cardiovascular status: acceptable  Respiratory status: acceptable  Hydration status: acceptable

## 2020-12-03 NOTE — ANESTHESIA PREPROCEDURE EVALUATION
Anesthesia Evaluation     Nursing notes reviewed   history of anesthetic complications: PONV  NPO Solid Status: > 8 hours  NPO Liquid Status: > 8 hours           Airway   Mallampati: II  TM distance: >3 FB  Neck ROM: full  no difficulty expected  Dental    (+) lower dentures and upper dentures    Pulmonary    (+) a smoker Former, COPD moderate,   Cardiovascular   Exercise tolerance: poor (<4 METS)    PT is on anticoagulation therapy  Patient on routine beta blocker and Beta blocker given within 24 hours of surgery    (+) hypertension well controlled less than 2 medications, PVD, DVT resolved, hyperlipidemia,       Neuro/Psych  (+) headaches,     GI/Hepatic/Renal/Endo    (+) obesity,  GERD poorly controlled,      Musculoskeletal (-) negative ROS    Abdominal   (+) obese,    Substance History - negative use     OB/GYN negative ob/gyn ROS         Other - negative ROS                         Anesthesia Plan    ASA 3     MAC     intravenous induction     Anesthetic plan, all risks, benefits, and alternatives have been provided, discussed and informed consent has been obtained with: patient.    Plan discussed with CRNA.

## 2020-12-03 NOTE — H&P
[facility] Gastroenterology  Pre Procedure History & Physical    Chief Complaint:   Colon polyps    Subjective     HPI:   The patient has a history of colon polyps who presents for exam.    Past Medical History:   Past Medical History:   Diagnosis Date   • AVM (arteriovenous malformation) of colon    • Colon polyp    • COPD (chronic obstructive pulmonary disease) (CMS/HCC)    • DVT (deep venous thrombosis) (CMS/HCC)    • Family history of colon cancer    • Family history of colonic polyps    • GERD (gastroesophageal reflux disease)    • History of adenomatous polyp of colon    • History of transfusion     after vascular surgery   • Hypercholesterolemia    • Hypertension    • IBS (irritable bowel syndrome)    • Migraine    • Peripheral artery disease (CMS/HCC)    • PONV (postoperative nausea and vomiting)    • PVD (peripheral vascular disease) (CMS/HCC)        Past Surgical History:  Past Surgical History:   Procedure Laterality Date   • AORTA FEMORAL BYPASS     • APPENDECTOMY     • CHOLECYSTECTOMY     • COLONOSCOPY  10/28/2011   • COLONOSCOPY N/A 6/19/2017    Procedure: COLONOSCOPY WITH ANESTHESIA;  Surgeon: Zack Do MD;  Location: [facility] ENDOSCOPY;  Service:    • ENDOSCOPY  01/26/2007   • ENDOSCOPY N/A 6/19/2017    Procedure: ESOPHAGOGASTRODUODENOSCOPY WITH ANESTHESIA;  Surgeon: Zack Do MD;  Location: [facility] ENDOSCOPY;  Service:    • HYSTERECTOMY     • PERIPHERAL ARTERIAL STENT GRAFT         Family History:  Family History   Problem Relation Age of Onset   • Rectal cancer Sister         In her 50's   • Colon polyps Sister         in her 40 's    • Colon cancer Sister         rectal cancer in her 50 's   • Stomach cancer Brother    • Coronary artery disease Mother    • Cancer Father         lung   • Esophageal cancer Neg Hx    • Liver cancer Neg Hx    • Liver disease Neg Hx        Social History:   reports that she quit smoking about 6 years ago. She has never used smokeless tobacco. She  "reports current alcohol use. She reports that she does not use drugs.    Medications:   Prior to Admission medications    Medication Sig Start Date End Date Taking? Authorizing Provider   metoprolol succinate XL (Toprol XL) 50 MG 24 hr tablet Take 50 mg by mouth Daily.   Yes Ion Delacruz MD   verapamil SR (CALAN-SR) 240 MG CR tablet Take 240 mg by mouth every night.   Yes Ion Delacruz MD   atorvastatin (LIPITOR) 20 MG tablet Take 20 mg by mouth daily.    Ion Delacruz MD   cilostazol (PLETAL) 100 MG tablet Take 100 mg by mouth 2 (Two) Times a Day.    Ion Delacruz MD   rivaroxaban (XARELTO) 20 MG tablet Take 20 mg by mouth daily.    ProviderIon MD       Allergies:  Contrast dye, Azithromycin, Codeine, Penicillin g sodium, and Sulfa antibiotics    ROS:    General: Weight stable  Resp: No SOA  Cardiovascular: No CP    Objective     Blood pressure 142/79, pulse 91, temperature 96.9 °F (36.1 °C), temperature source Temporal, resp. rate 18, height 175.3 cm (69\"), weight 98.9 kg (218 lb), SpO2 95 %, not currently breastfeeding.    Physical Exam   Constitutional: Pt is oriented to person, place, and in no distress.   Cardiovascular: Normal rate, regular rhythm.    Pulmonary/Chest: Effort normal. No respiratory distress.   Abdominal:  Non-distended.  Psychiatric: Mood, memory, affect and judgment appear normal.     Assessment/Plan     Diagnosis:  Colon polyps    Anticipated Surgical Procedure:  Colonoscopy    The risks, benefits, and alternatives of this procedure have been discussed with the patient or the responsible party- the patient understands and agrees to proceed.      EMR Dragon/transcription disclaimer:  Much of this encounter note is electronic transcription/translation of spoken language to printed text.  The electronic translation of spoken language may be erroneous, or at times, nonsensical words or phrases may be inadvertently transcribed.  Although I have reviewed " the note for such errors, some may still exist.

## 2020-12-04 LAB
LAB AP CASE REPORT: NORMAL
PATH REPORT.FINAL DX SPEC: NORMAL
PATH REPORT.GROSS SPEC: NORMAL

## 2021-02-06 ENCOUNTER — HOSPITAL ENCOUNTER (OUTPATIENT)
Dept: GENERAL RADIOLOGY | Facility: HOSPITAL | Age: 61
Discharge: HOME | End: 2021-02-06
Admitting: NURSE PRACTITIONER

## 2021-02-06 PROCEDURE — 73562 X-RAY EXAM OF KNEE 3: CPT

## 2021-06-01 ENCOUNTER — HOSPITAL ENCOUNTER (OUTPATIENT)
Dept: ULTRASOUND IMAGING | Facility: HOSPITAL | Age: 61
Discharge: HOME | End: 2021-06-01
Admitting: PHYSICIAN ASSISTANT

## 2021-06-01 ENCOUNTER — TRANSCRIBE ORDERS (OUTPATIENT)
Dept: ADMINISTRATIVE | Facility: HOSPITAL | Age: 61
End: 2021-06-01

## 2021-06-01 DIAGNOSIS — R60.0 LOWER EXTREMITY EDEMA: Primary | ICD-10-CM

## 2021-06-01 DIAGNOSIS — R60.0 LOWER EXTREMITY EDEMA: ICD-10-CM

## 2021-06-01 PROCEDURE — 93971 EXTREMITY STUDY: CPT

## 2021-07-02 ENCOUNTER — TELEPHONE (OUTPATIENT)
Dept: VASCULAR SURGERY | Facility: CLINIC | Age: 61
End: 2021-07-02

## 2021-07-02 NOTE — TELEPHONE ENCOUNTER
Spoke with Mrs Gonzalez reminding her of her appointments for Tuesday, July 6th, 2021. [facility] for 8 o'clock testing and follow up afterwards at 10 am with Dr Young. Mrs Gonzalez confirmed she would be here.

## 2021-07-06 ENCOUNTER — HOSPITAL ENCOUNTER (OUTPATIENT)
Dept: ULTRASOUND IMAGING | Facility: HOSPITAL | Age: 61
Discharge: HOME | End: 2021-07-06

## 2021-07-06 ENCOUNTER — OFFICE VISIT (OUTPATIENT)
Dept: VASCULAR SURGERY | Facility: CLINIC | Age: 61
End: 2021-07-06

## 2021-07-06 VITALS
OXYGEN SATURATION: 96 % | WEIGHT: 226 LBS | DIASTOLIC BLOOD PRESSURE: 74 MMHG | BODY MASS INDEX: 33.47 KG/M2 | HEART RATE: 79 BPM | SYSTOLIC BLOOD PRESSURE: 130 MMHG | HEIGHT: 69 IN

## 2021-07-06 DIAGNOSIS — I10 ESSENTIAL HYPERTENSION: ICD-10-CM

## 2021-07-06 DIAGNOSIS — I73.9 PERIPHERAL ARTERY DISEASE (HCC): Primary | ICD-10-CM

## 2021-07-06 DIAGNOSIS — I65.23 BILATERAL CAROTID ARTERY STENOSIS: ICD-10-CM

## 2021-07-06 DIAGNOSIS — I73.9 PAD (PERIPHERAL ARTERY DISEASE) (HCC): ICD-10-CM

## 2021-07-06 DIAGNOSIS — E78.00 HYPERCHOLESTEROLEMIA: ICD-10-CM

## 2021-07-06 DIAGNOSIS — M79.89 LEG SWELLING: ICD-10-CM

## 2021-07-06 DIAGNOSIS — I74.09 AORTOILIAC OCCLUSIVE DISEASE (HCC): ICD-10-CM

## 2021-07-06 PROCEDURE — 93923 UPR/LXTR ART STDY 3+ LVLS: CPT

## 2021-07-06 PROCEDURE — 99214 OFFICE O/P EST MOD 30 MIN: CPT | Performed by: SURGERY

## 2021-07-06 PROCEDURE — 93880 EXTRACRANIAL BILAT STUDY: CPT | Performed by: SURGERY

## 2021-07-06 PROCEDURE — 93880 EXTRACRANIAL BILAT STUDY: CPT

## 2021-07-06 PROCEDURE — 93923 UPR/LXTR ART STDY 3+ LVLS: CPT | Performed by: SURGERY

## 2021-07-06 NOTE — PROGRESS NOTES
"7/6/2021       Nikolai Treadwell MD  [address] [place] [address]        Tabitha Gonzalez  1960    Chief Complaint   Patient presents with   • Follow-up     1 Year Follow Up For Peripheral Artery Disease and Bilateral Carotid Artery STenosis. Test [id number] US pad ankle / brach ind ext comp and US pad carotid bilateral. Patient denies any stroke like symptoms.    • Former Smoker     Patient is a Former Smoker - Quit 2014   • Med Management     Verbally verified medications with patient        Dear Nikolai Treadwell MD:    HPI     I had the pleasure of seeing you patient in the office today for follow up.  As you recall, the patient is a 61 y.o. female who has a longstanding history of PAD.  She has undergone previous aortobifemoral bypass with bilateral lower extremity stenting by Dr. Clark.  She does have a history of chronic back pain with bulging discs.  She denies any strokelike symptoms.  She began having some swelling to her lower extremities bilaterally over the past couple of weeks.  She states her legs are tired. She did have noninvasive testing performed today, which I did review in office.    Review of Systems   Constitutional: Negative.    HENT: Negative.    Eyes: Negative.    Respiratory: Negative.    Cardiovascular: Positive for leg swelling.   Gastrointestinal: Negative.    Endocrine: Negative.    Genitourinary: Negative.    Musculoskeletal: Negative.    Skin: Negative.    Allergic/Immunologic: Negative.    Neurological: Negative.    Hematological: Negative.    Psychiatric/Behavioral: Negative.        /74 (BP Location: Right arm, Patient Position: Sitting, Cuff Size: Adult)   Pulse 79   Ht 175.3 cm (69\")   Wt 103 kg (226 lb)   SpO2 96%   BMI 33.37 kg/m²    Physical Exam  Vitals and nursing note reviewed.   Constitutional:       Appearance: She is well-developed.   HENT:      Head: Normocephalic and atraumatic.   Eyes:      General: No scleral icterus.     Pupils: Pupils are " equal, round, and reactive to light.   Neck:      Thyroid: No thyromegaly.      Vascular: No carotid bruit or JVD.   Cardiovascular:      Rate and Rhythm: Normal rate and regular rhythm.      Pulses:           Carotid pulses are 2+ on the right side and 2+ on the left side.       Femoral pulses are 2+ on the right side and 2+ on the left side.       Popliteal pulses are 2+ on the right side and 2+ on the left side.        Dorsalis pedis pulses are 2+ on the right side and 2+ on the left side.        Posterior tibial pulses are detected w/ Doppler on the right side and 2+ on the left side.      Heart sounds: Normal heart sounds.   Pulmonary:      Effort: Pulmonary effort is normal.      Breath sounds: Normal breath sounds.   Abdominal:      General: Bowel sounds are normal. There is no distension or abdominal bruit.      Palpations: Abdomen is soft. There is no mass.      Tenderness: There is no abdominal tenderness.   Musculoskeletal:         General: Normal range of motion.      Cervical back: Normal range of motion and neck supple.   Lymphadenopathy:      Cervical: No cervical adenopathy.   Skin:     General: Skin is warm and dry.   Neurological:      Mental Status: She is alert and oriented to person, place, and time.      Cranial Nerves: No cranial nerve deficit.      Sensory: No sensory deficit.           DIAGNOSTIC DATA:    Noninvasive testing including a carotid duplex shows less than 50% carotid stenosis bilaterally with bilateral antegrade vertebral flow.           Patient Active Problem List   Diagnosis   • COPD (chronic obstructive pulmonary disease) (CMS/HCC)   • History of adenomatous polyp of colon   • Chronic constipation   • Family history of colon cancer   • Epigastric pain   • Peripheral artery disease (CMS/HCC)   • Hypertension   • Hypercholesterolemia   • DVT (deep venous thrombosis) (CMS/HCC)   • BMI 30.0-30.9,adult         ICD-10-CM ICD-9-CM   1. Peripheral artery disease (CMS/HCC)  I73.9 443.9    2. Essential hypertension  I10 401.9   3. Hypercholesterolemia  E78.00 272.0   4. Leg swelling  M79.89 729.81   5. Bilateral carotid artery stenosis  I65.23 433.10     433.30   6. Aortoiliac occlusive disease (CMS/HCC)  I74.09 444.09       PLAN: After thoroughly evaluating Tabitha Gonzalez, I believe the best course of action is to remain conservative from a vascular standpoint.  I did review her testing and ABIs have dropped a little, but waveforms are preserved.  Her aortobifemoral bypass graft remains widely patent.  Her discomfort is likely related to her back.  She also remains less than 50% stenosis bilateral carotids.  We will see Tabitha Gonzalez back in 1 year with repeat noninvasive testing for continued surveillance, including ABIs and a carotid duplex.  I did recommend she could wear compression stockings to aid in her swelling and explained how to wear these on a daily basis.  She should apply these in the morning and wear them throughout the day.  I did discuss vascular risk factors as they pertain to the progression of vascular disease including controlling her hypertension and hypercholesterolemia.  These risk factors are currently stable.  Body mass index is 33.37 kg/m². Follow up with PCP regarding abnormal BMI.  The patient is to continue taking their medications as previously discussed.   This was all discussed in full with complete understanding.  Thank you for allowing me to participate in the care of your patient.  Please do not hesitate to call with any questions or concerns.  We will keep you aware of any further encounters with Tabitha Gonzalez.      Sincerely Yours,        Tony Young, DO       Scribed for Dr. Tony Young by Carin MANSFIELD

## 2022-06-22 ENCOUNTER — HOSPITAL ENCOUNTER (OUTPATIENT)
Dept: ULTRASOUND IMAGING | Facility: HOSPITAL | Age: 62
Discharge: HOME | End: 2022-06-22

## 2022-06-22 ENCOUNTER — TRANSCRIBE ORDERS (OUTPATIENT)
Dept: ADMINISTRATIVE | Facility: HOSPITAL | Age: 62
End: 2022-06-22

## 2022-06-22 DIAGNOSIS — I73.9 PERIPHERAL VASCULAR DISEASE: ICD-10-CM

## 2022-06-22 DIAGNOSIS — R10.31 RIGHT GROIN PAIN: ICD-10-CM

## 2022-06-22 DIAGNOSIS — R10.31 RIGHT GROIN PAIN: Primary | ICD-10-CM

## 2022-06-22 PROCEDURE — 93926 LOWER EXTREMITY STUDY: CPT | Performed by: SURGERY

## 2022-06-22 PROCEDURE — 93926 LOWER EXTREMITY STUDY: CPT

## 2022-06-29 ENCOUNTER — TELEPHONE (OUTPATIENT)
Dept: VASCULAR SURGERY | Facility: CLINIC | Age: 62
End: 2022-06-29

## 2022-06-30 ENCOUNTER — OFFICE VISIT (OUTPATIENT)
Dept: VASCULAR SURGERY | Facility: CLINIC | Age: 62
End: 2022-06-30

## 2022-06-30 ENCOUNTER — TRANSCRIBE ORDERS (OUTPATIENT)
Dept: ADMINISTRATIVE | Facility: HOSPITAL | Age: 62
End: 2022-06-30

## 2022-06-30 ENCOUNTER — HOSPITAL ENCOUNTER (OUTPATIENT)
Dept: ULTRASOUND IMAGING | Facility: HOSPITAL | Age: 62
Discharge: HOME | End: 2022-06-30

## 2022-06-30 VITALS
OXYGEN SATURATION: 96 % | SYSTOLIC BLOOD PRESSURE: 122 MMHG | HEIGHT: 69 IN | BODY MASS INDEX: 33.92 KG/M2 | HEART RATE: 82 BPM | DIASTOLIC BLOOD PRESSURE: 68 MMHG | WEIGHT: 229 LBS

## 2022-06-30 DIAGNOSIS — I73.9 PERIPHERAL ARTERY DISEASE: Primary | ICD-10-CM

## 2022-06-30 DIAGNOSIS — I10 ESSENTIAL HYPERTENSION: ICD-10-CM

## 2022-06-30 DIAGNOSIS — I65.23 BILATERAL CAROTID ARTERY STENOSIS: ICD-10-CM

## 2022-06-30 DIAGNOSIS — E78.00 HYPERCHOLESTEROLEMIA: ICD-10-CM

## 2022-06-30 DIAGNOSIS — I74.09 AORTOILIAC OCCLUSIVE DISEASE: ICD-10-CM

## 2022-06-30 DIAGNOSIS — I73.9 PERIPHERAL ARTERY DISEASE: ICD-10-CM

## 2022-06-30 PROCEDURE — 99214 OFFICE O/P EST MOD 30 MIN: CPT | Performed by: SURGERY

## 2022-06-30 PROCEDURE — 93880 EXTRACRANIAL BILAT STUDY: CPT | Performed by: SURGERY

## 2022-06-30 PROCEDURE — 93923 UPR/LXTR ART STDY 3+ LVLS: CPT | Performed by: SURGERY

## 2022-06-30 PROCEDURE — 93923 UPR/LXTR ART STDY 3+ LVLS: CPT

## 2022-06-30 PROCEDURE — 93880 EXTRACRANIAL BILAT STUDY: CPT

## 2022-06-30 RX ORDER — ATORVASTATIN CALCIUM 20 MG/1
1 TABLET, FILM COATED ORAL DAILY
COMMUNITY

## 2022-06-30 NOTE — PROGRESS NOTES
"6/30/2022       Nikolai Treadwell MD  [address]  [place] [address]        Tabitha Gonzalez  1960    Chief Complaint   Patient presents with   • Follow-up     Pt here for 1 Yr F/U.   Pt states she is having pn in her R groin area.pn level 8 at its worse.  Pt denies any stroke like Sx  Pt was a Former Smoker (Quit Date: 01/01/2014)       Dear Nikolai Treadwell MD:    HPI     I had the pleasure of seeing you patient in the office today for follow up.  As you recall, the patient is a 62 y.o. female who has a longstanding history of PAD.  She has undergone previous aortobifemoral bypass with bilateral lower extremity stenting by Dr. Clark.  She does have a history of chronic back pain with bulging discs.  She denies any strokelike symptoms.  She began having some swelling to her lower extremities bilaterally.   She is having pain lifting her right leg, pain to the right groin. She denies any claudication.  She is maintained on Xarelto, Pletal, and Lipitor. She did have noninvasive testing performed today, which I did review in office.    Review of Systems   Constitutional: Negative.    HENT: Negative.    Eyes: Negative.    Respiratory: Negative.    Cardiovascular: Positive for leg swelling.   Gastrointestinal: Negative.    Endocrine: Negative.    Genitourinary: Negative.    Musculoskeletal: Positive for arthralgias.        Right groin pain   Skin: Negative.    Allergic/Immunologic: Negative.    Neurological: Negative.    Hematological: Negative.    Psychiatric/Behavioral: Negative.        /68   Pulse 82   Ht 175.3 cm (69\")   Wt 104 kg (229 lb)   SpO2 96%   BMI 33.82 kg/m²    Physical Exam  Vitals and nursing note reviewed.   Constitutional:       Appearance: She is well-developed.   HENT:      Head: Normocephalic and atraumatic.   Eyes:      General: No scleral icterus.     Pupils: Pupils are equal, round, and reactive to light.   Neck:      Thyroid: No thyromegaly.      Vascular: No carotid bruit " or JVD.   Cardiovascular:      Rate and Rhythm: Normal rate and regular rhythm.      Pulses:           Carotid pulses are 2+ on the right side and 2+ on the left side.       Femoral pulses are 2+ on the right side and 2+ on the left side.       Popliteal pulses are 2+ on the right side and 2+ on the left side.        Dorsalis pedis pulses are 2+ on the right side and 2+ on the left side.        Posterior tibial pulses are detected w/ Doppler on the right side and 2+ on the left side.      Heart sounds: Normal heart sounds.   Pulmonary:      Effort: Pulmonary effort is normal.      Breath sounds: Normal breath sounds.   Abdominal:      General: Bowel sounds are normal. There is no distension or abdominal bruit.      Palpations: Abdomen is soft. There is no mass.      Tenderness: There is no abdominal tenderness.   Musculoskeletal:         General: Normal range of motion.      Cervical back: Normal range of motion and neck supple.   Lymphadenopathy:      Cervical: No cervical adenopathy.   Skin:     General: Skin is warm and dry.   Neurological:      Mental Status: She is alert and oriented to person, place, and time.      Cranial Nerves: No cranial nerve deficit.      Sensory: No sensory deficit.         DIAGNOSTIC DATA:       US Carotid Bilateral    Result Date: 6/30/2022  Narrative: History: Carotid occlusive disease      Impression: Impression: 1. There is less than 50% stenosis of the right internal carotid artery. 2. There is less than 50% stenosis of the left internal carotid artery. 3. Antegrade flow is demonstrated in the right vertebral. Left vertebral was not visualized.  Comments: Bilateral carotid vertebral arterial duplex scan was performed.  Grayscale imaging shows intimal thickening and calcified elements at the carotid bifurcation. The right internal carotid artery peak systolic velocity is 76.8 cm/sec. The end-diastolic velocity is 13.8 cm/sec. The right ICA/CCA ratio is approximately 1.6 . These  findings correlate with less than 50% stenosis of the right internal carotid artery.  Grayscale imaging shows intimal thickening and calcified elements at the carotid bifurcation. The left internal carotid artery peak systolic velocity is 75 cm/sec. The end-diastolic velocity is 22 cm/sec. The left ICA/CCA ratio is approximately 1.1 . These findings correlate with less than 50% stenosis of the left internal carotid artery.    This report was finalized on 06/30/2022 15:21 by Dr. Tony Young MD.    US Ankle / Brachial Indices Extremity Complete    Result Date: 6/30/2022  Narrative:  History: PAD  Comments: Bilateral lower extremity arterial with multi-level pulse volume recordings and segmental pressures were performed at rest and stress.  The right ankle/brachial index is 0.88. The waveforms are biphasic without dampening.These findings are consistent with mild arterial insufficiency of the right lower extremity at rest.  The left ankle/brachial index is 0.91. The waveforms are biphasic without dampening. These findings are consistent with mild arterial insufficiency of the left lower extremity at rest.      Impression: Impression: 1. Mild arterial insufficiency of the right lower extremity at rest. 2. Mild arterial insufficiency of the left lower extremity at rest.   This report was finalized on 06/30/2022 15:19 by Dr. Tony Young MD.    US Arterial Doppler Lower Extremity Right    Result Date: 6/23/2022  Narrative: History: Right groin pain  Comments: Right lower extremity arterial duplex was performed using grayscale imaging color flow Doppler.  FINDINGS: Patient has an aortobifemoral bypass graft. The distal graft peak systolic velocity is 183 cm/s. At the anastomosis of graft with the common femoral artery peak systolic velocity is 392 cm/s. In the common femoral artery peak systolic velocity is 225 cm/s. In the profunda peak systolic velocity is 77.2 cm/s. In the proximal SFA peak systolic velocity is  86 cm/s. In the mid SFA peak systolic velocity is 74.9 cm/s. The distal posterior tibial artery has peak systolic velocity of 24.3 cm/s. All arteries appear patent with no evidence of significant occlusion. There does appear to be possible stenosis at the anastomosis of the bypass limb with the common femoral artery. The common femoral artery also does appear to be somewhat aneurysmal with maximal measured diameter of 1.4 cm.      Impression: Patent visualized portion of the right limb of the aortofemoral bypass graft. There appears to be some stenosis at the anastomosis of the graft with the common femoral artery. Otherwise all arteries in the right lower extremity appear patent with no evidence of significant stenosis. 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 Further evaluation with CTA of the abdomen/pelvis may be warranted. This report was finalized on 06/23/2022 14:24 by Dr. Milan Arias MD.      Patient Active Problem List   Diagnosis   • COPD (chronic obstructive pulmonary disease) (HCC)   • History of adenomatous polyp of colon   • Chronic constipation   • Family history of colon cancer   • Epigastric pain   • Peripheral artery disease (HCC)   • Hypertension   • Hypercholesterolemia   • DVT (deep venous thrombosis) (HCC)   • BMI 30.0-30.9,adult         ICD-10-CM ICD-9-CM   1. Peripheral artery disease (HCC)  I73.9 443.9   2. Bilateral carotid artery stenosis  I65.23 433.10     433.30   3. Essential hypertension  I10 401.9   4. Hypercholesterolemia  E78.00 272.0   5. Aortoiliac occlusive disease (HCC)  I74.09 444.09       PLAN: After thoroughly evaluating Tabitha oGnzalez, I believe the best course of action is to remain conservative from vascular surgery standpoint.  I did review her testing which shows mild arterial insufficiency to her lower extremities and less than 50% carotid stenosis bilaterally.  I also reviewed recent  arterial duplex.  Her pain is not from a vascular source.  She likely needs an MRI of her lumbar spine as her pain is likely referred from her low back.  I will see her back in 1 years time with repeat noninvasive testing for continued surveillance, including ABIs and a carotid duplex.  She can continue with compression stockings to aid with her swelling.  I did discuss vascular risk factors as they pertain to the progression of vascular disease including controlling her hypertension and hypercholesterolemia.  These risk factors are currently stable.  The patient is to continue taking their medications as previously discussed.   This was all discussed in full with complete understanding.  Thank you for allowing me to participate in the care of your patient.  Please do not hesitate to call with any questions or concerns.  We will keep you aware of any further encounters with Tabitha Gonzalez.      Sincerely Yours,        Tony Young, DO       Scribed for Dr. Tony Young by Cairn MANSFIELD

## 2022-07-07 ENCOUNTER — TRANSCRIBE ORDERS (OUTPATIENT)
Dept: ADMINISTRATIVE | Facility: HOSPITAL | Age: 62
End: 2022-07-07

## 2022-07-07 DIAGNOSIS — I77.9 DISORDER OF ARTERIES AND ARTERIOLES: Primary | ICD-10-CM

## 2022-07-07 DIAGNOSIS — I73.9 PERIPHERAL VASCULAR DISEASE, UNSPECIFIED: ICD-10-CM

## 2022-07-20 ENCOUNTER — APPOINTMENT (OUTPATIENT)
Dept: CT IMAGING | Facility: HOSPITAL | Age: 62
End: 2022-07-20

## 2022-07-21 ENCOUNTER — HOSPITAL ENCOUNTER (OUTPATIENT)
Dept: CT IMAGING | Facility: HOSPITAL | Age: 62
Discharge: HOME | End: 2022-07-21
Admitting: FAMILY MEDICINE

## 2022-07-21 DIAGNOSIS — I73.9 PERIPHERAL VASCULAR DISEASE, UNSPECIFIED: ICD-10-CM

## 2022-07-21 DIAGNOSIS — I77.9 DISORDER OF ARTERIES AND ARTERIOLES: ICD-10-CM

## 2022-07-21 LAB — CREAT BLDA-MCNC: 1 MG/DL (ref 0.6–1.3)

## 2022-07-21 PROCEDURE — 74174 CTA ABD&PLVS W/CONTRAST: CPT

## 2022-07-21 PROCEDURE — 82565 ASSAY OF CREATININE: CPT

## 2022-07-21 PROCEDURE — 0 IOPAMIDOL PER 1 ML: Performed by: FAMILY MEDICINE

## 2022-07-21 RX ADMIN — IOPAMIDOL 100 ML: 755 INJECTION, SOLUTION INTRAVENOUS at 10:58

## 2022-08-02 ENCOUNTER — TELEPHONE (OUTPATIENT)
Dept: VASCULAR SURGERY | Facility: CLINIC | Age: 62
End: 2022-08-02

## 2022-08-02 NOTE — TELEPHONE ENCOUNTER
Caller: Tabitha Gonzalez    Relationship: Self    Best call back number: [phone]     Caller requesting test results: SELF    What test was performed: CT    When was the test performed: 7/21/22    Where was the test performed: [facility]        Additional notes: TABITHA WOULD LIKE A CALL BACK TO KNOW THE RESULTS OF HER CT.

## 2022-10-11 NOTE — ANESTHESIA PREPROCEDURE EVALUATION
Anesthesia Evaluation     Nursing notes reviewed   history of anesthetic complications: PONV  NPO Solid Status: > 8 hours  NPO Liquid Status: > 8 hours     Airway   Mallampati: II  TM distance: >3 FB  Neck ROM: full  no difficulty expected  Dental    (+) lower dentures and upper dentures    Pulmonary - normal exam    breath sounds clear to auscultation  (+) a smoker Former, COPD moderate,   Cardiovascular - normal exam  Exercise tolerance: poor (<4 METS)    PT is on anticoagulation therapy  Patient on routine beta blocker and Beta blocker given within 24 hours of surgery  Rhythm: regular  Rate: normal    (+) hypertension well controlled, PVD, DVT resolved, hyperlipidemia      Neuro/Psych  (+) headaches,    GI/Hepatic/Renal/Endo    (+) obesity,  GERD well controlled,     Musculoskeletal (-) negative ROS    Abdominal   (+) obese,    Substance History - negative use     OB/GYN negative ob/gyn ROS         Other - negative ROS                                       Anesthesia Plan    ASA 3     general     intravenous induction   Anesthetic plan and risks discussed with patient and spouse/significant other.    Plan discussed with CRNA.       none

## 2023-01-24 ENCOUNTER — APPOINTMENT (OUTPATIENT)
Dept: GENERAL RADIOLOGY | Age: 63
End: 2023-01-24
Attending: FAMILY MEDICINE
Payer: MEDICARE

## 2023-01-24 ENCOUNTER — HOSPITAL ENCOUNTER (INPATIENT)
Age: 63
LOS: 3 days | Discharge: HOME | End: 2023-01-27
Attending: FAMILY MEDICINE | Admitting: FAMILY MEDICINE
Payer: MEDICARE

## 2023-01-24 PROBLEM — J44.1 COPD EXACERBATION (HCC): Status: ACTIVE | Noted: 2023-01-24

## 2023-01-24 LAB
GLUCOSE BLD-MCNC: 150 MG/DL (ref 70–99)
GLUCOSE BLD-MCNC: 176 MG/DL (ref 70–99)
PERFORMED ON: ABNORMAL
PERFORMED ON: ABNORMAL

## 2023-01-24 PROCEDURE — 2580000003 HC RX 258: Performed by: FAMILY MEDICINE

## 2023-01-24 PROCEDURE — 6360000002 HC RX W HCPCS: Performed by: FAMILY MEDICINE

## 2023-01-24 PROCEDURE — 82962 GLUCOSE BLOOD TEST: CPT

## 2023-01-24 PROCEDURE — 6370000000 HC RX 637 (ALT 250 FOR IP): Performed by: FAMILY MEDICINE

## 2023-01-24 PROCEDURE — 87040 BLOOD CULTURE FOR BACTERIA: CPT

## 2023-01-24 PROCEDURE — 71046 X-RAY EXAM CHEST 2 VIEWS: CPT

## 2023-01-24 PROCEDURE — 36415 COLL VENOUS BLD VENIPUNCTURE: CPT

## 2023-01-24 PROCEDURE — 1210000000 HC MED SURG R&B

## 2023-01-24 PROCEDURE — 71046 X-RAY EXAM CHEST 2 VIEWS: CPT | Performed by: RADIOLOGY

## 2023-01-24 PROCEDURE — 94640 AIRWAY INHALATION TREATMENT: CPT

## 2023-01-24 RX ORDER — POTASSIUM CHLORIDE 7.45 MG/ML
10 INJECTION INTRAVENOUS PRN
Status: DISCONTINUED | OUTPATIENT
Start: 2023-01-24 | End: 2023-01-27 | Stop reason: HOSPADM

## 2023-01-24 RX ORDER — ONDANSETRON 4 MG/1
4 TABLET, ORALLY DISINTEGRATING ORAL EVERY 8 HOURS PRN
Status: DISCONTINUED | OUTPATIENT
Start: 2023-01-24 | End: 2023-01-27 | Stop reason: HOSPADM

## 2023-01-24 RX ORDER — ACETAMINOPHEN 325 MG/1
650 TABLET ORAL EVERY 6 HOURS PRN
Status: DISCONTINUED | OUTPATIENT
Start: 2023-01-24 | End: 2023-01-27 | Stop reason: HOSPADM

## 2023-01-24 RX ORDER — ACETAMINOPHEN 500 MG
1000 TABLET ORAL EVERY 6 HOURS PRN
COMMUNITY

## 2023-01-24 RX ORDER — MAGNESIUM SULFATE 1 G/100ML
1000 INJECTION INTRAVENOUS PRN
Status: DISCONTINUED | OUTPATIENT
Start: 2023-01-24 | End: 2023-01-27 | Stop reason: HOSPADM

## 2023-01-24 RX ORDER — POTASSIUM CHLORIDE 20 MEQ/1
40 TABLET, EXTENDED RELEASE ORAL PRN
Status: DISCONTINUED | OUTPATIENT
Start: 2023-01-24 | End: 2023-01-27 | Stop reason: HOSPADM

## 2023-01-24 RX ORDER — SODIUM CHLORIDE 9 MG/ML
INJECTION, SOLUTION INTRAVENOUS CONTINUOUS
Status: DISCONTINUED | OUTPATIENT
Start: 2023-01-24 | End: 2023-01-27 | Stop reason: HOSPADM

## 2023-01-24 RX ORDER — ONDANSETRON 2 MG/ML
4 INJECTION INTRAMUSCULAR; INTRAVENOUS EVERY 6 HOURS PRN
Status: DISCONTINUED | OUTPATIENT
Start: 2023-01-24 | End: 2023-01-27 | Stop reason: HOSPADM

## 2023-01-24 RX ORDER — ENOXAPARIN SODIUM 100 MG/ML
40 INJECTION SUBCUTANEOUS DAILY
Status: DISCONTINUED | OUTPATIENT
Start: 2023-01-24 | End: 2023-01-24 | Stop reason: DRUGHIGH

## 2023-01-24 RX ORDER — METHYLPREDNISOLONE SODIUM SUCCINATE 125 MG/2ML
80 INJECTION, POWDER, LYOPHILIZED, FOR SOLUTION INTRAMUSCULAR; INTRAVENOUS EVERY 8 HOURS
Status: DISCONTINUED | OUTPATIENT
Start: 2023-01-24 | End: 2023-01-25

## 2023-01-24 RX ORDER — SODIUM CHLORIDE 0.9 % (FLUSH) 0.9 %
10 SYRINGE (ML) INJECTION PRN
Status: DISCONTINUED | OUTPATIENT
Start: 2023-01-24 | End: 2023-01-27 | Stop reason: HOSPADM

## 2023-01-24 RX ORDER — POLYETHYLENE GLYCOL 3350 17 G/17G
17 POWDER, FOR SOLUTION ORAL DAILY PRN
Status: DISCONTINUED | OUTPATIENT
Start: 2023-01-24 | End: 2023-01-27 | Stop reason: HOSPADM

## 2023-01-24 RX ORDER — SODIUM CHLORIDE 9 MG/ML
INJECTION, SOLUTION INTRAVENOUS PRN
Status: DISCONTINUED | OUTPATIENT
Start: 2023-01-24 | End: 2023-01-27 | Stop reason: HOSPADM

## 2023-01-24 RX ORDER — ENOXAPARIN SODIUM 100 MG/ML
30 INJECTION SUBCUTANEOUS 2 TIMES DAILY
Status: DISCONTINUED | OUTPATIENT
Start: 2023-01-24 | End: 2023-01-25

## 2023-01-24 RX ORDER — ACETAMINOPHEN 650 MG/1
650 SUPPOSITORY RECTAL EVERY 6 HOURS PRN
Status: DISCONTINUED | OUTPATIENT
Start: 2023-01-24 | End: 2023-01-27 | Stop reason: HOSPADM

## 2023-01-24 RX ORDER — IPRATROPIUM BROMIDE AND ALBUTEROL SULFATE 2.5; .5 MG/3ML; MG/3ML
1 SOLUTION RESPIRATORY (INHALATION)
Status: DISCONTINUED | OUTPATIENT
Start: 2023-01-24 | End: 2023-01-27 | Stop reason: HOSPADM

## 2023-01-24 RX ORDER — SODIUM CHLORIDE 0.9 % (FLUSH) 0.9 %
5-40 SYRINGE (ML) INJECTION EVERY 12 HOURS SCHEDULED
Status: DISCONTINUED | OUTPATIENT
Start: 2023-01-24 | End: 2023-01-27 | Stop reason: HOSPADM

## 2023-01-24 RX ADMIN — ONDANSETRON 4 MG: 2 INJECTION INTRAMUSCULAR; INTRAVENOUS at 16:26

## 2023-01-24 RX ADMIN — METHYLPREDNISOLONE SODIUM SUCCINATE 80 MG: 125 INJECTION, POWDER, FOR SOLUTION INTRAMUSCULAR; INTRAVENOUS at 15:20

## 2023-01-24 RX ADMIN — SODIUM CHLORIDE: 9 INJECTION, SOLUTION INTRAVENOUS at 15:20

## 2023-01-24 RX ADMIN — IPRATROPIUM BROMIDE AND ALBUTEROL SULFATE 1 AMPULE: 2.5; .5 SOLUTION RESPIRATORY (INHALATION) at 20:34

## 2023-01-24 RX ADMIN — ENOXAPARIN SODIUM 30 MG: 100 INJECTION SUBCUTANEOUS at 20:20

## 2023-01-24 RX ADMIN — AZITHROMYCIN MONOHYDRATE 500 MG: 500 INJECTION, POWDER, LYOPHILIZED, FOR SOLUTION INTRAVENOUS at 15:27

## 2023-01-24 RX ADMIN — SODIUM CHLORIDE, PRESERVATIVE FREE 1000 MG: 5 INJECTION INTRAVENOUS at 15:21

## 2023-01-24 RX ADMIN — METHYLPREDNISOLONE SODIUM SUCCINATE 80 MG: 125 INJECTION, POWDER, FOR SOLUTION INTRAMUSCULAR; INTRAVENOUS at 20:20

## 2023-01-24 NOTE — CARE COORDINATION
Case Management Assessment  Initial Evaluation    Date/Time of Evaluation: 1/24/2023 2:33 PM  Assessment Completed by: NORAH Barakat    If patient is discharged prior to next notation, then this note serves as note for discharge by case management. Patient Name: Magaly Antoine                   YOB: 1960  Diagnosis: COPD exacerbation ([facility] [address] [J44.1]                   Date / Time: 1/24/2023 12:12 PM    Patient Admission Status: Inpatient   Readmission Risk (Low < 19, Mod (19-27), High > 27): No data recorded  Current PCP: Lavern Hensley MD  PCP verified by CM? Yes    Chart Reviewed: Yes      History Provided by: Patient  Patient Orientation: Alert and Oriented, Person, Place, Situation, Self    Patient Cognition: Alert    Hospitalization in the last 30 days (Readmission):  No    If yes, Readmission Assessment in  Navigator will be completed. Advance Directives:      Code Status: Full Code   Patient's Primary Decision Maker is: Legal Next of Kin      Discharge Planning:    Patient lives with: Spouse/Significant Other Type of Home:    Primary Care Giver: Self  Patient Support Systems include: Spouse/Significant Other   Current Financial resources: Other (Comment) (Provided CHW program.)  Current community resources:    Current services prior to admission:              Current DME:              Type of Home Care services:       ADLS  Prior functional level: Independent in ADLs/IADLs  Current functional level: Independent in ADLs/IADLs    PT AM-PAC:   /24  OT AM-PAC:   /24    Family can provide assistance at DC: Yes  Would you like Case Management to discuss the discharge plan with any other family members/significant others, and if so, who? Yes  Plans to Return to Present Housing: Yes  Other Identified Issues/Barriers to RETURNING to current housing: limited family support.   Potential Assistance needed at discharge:              Potential DME:  NO preference on medical supply company. Patient expects to discharge to: House  Plan for transportation at discharge: Family    Financial    Payor: Eleazar Carrasco / Plan: Ashley Kelly PPO / Product Type: Medicare /     Does insurance require precert for SNF: Yes    Potential assistance Purchasing Medications:    Meds-to-Beds request:        [facility] [address]  [phone] [place] [phone]  [address] [address]  Phone: [phone] Fax: [phone]      Notes:    Factors facilitating achievement of predicted outcomes: Family support, Caregiver support, and Pleasant    Barriers to discharge: Limited family support    Additional Case Management Notes: Sw talked to Pt and at Pt bedside Pt spouse Constance Villarreal about D/C planning. Pt reports that she plans to go home with spouse when medically stable. Pt at this time declines HH. Sw provided CHW program Pt denies referral. Ravi Daily will follow to re assess. The Plan for Transition of Care is related to the following treatment goals of COPD exacerbation ([facility] [address] [C84.6]    IF APPLICABLE: The Patient and/or patient representative Ollis Severs and her family were provided with a choice of provider and agrees with the discharge plan. Freedom of choice list with basic dialogue that supports the patient's individualized plan of care/goals and shares the quality data associated with the providers was provided to: Patient   Patient Representative Name:       The Patient and/or Patient Representative Agree with the Discharge Plan? Yes  Electronically signed by NORAH Carrion on 1/24/2023 at 2:35 PM      NORAH Carrion  Case Management Department  [facility]  Fax: [phone]  [email or web address]

## 2023-01-24 NOTE — PROGRESS NOTES
Patient could not breathe in lobby, was stating 89-90 and stating 95-96 on 2L of 02. Patient states she has had a cold for the last couple of days. She woke up this morning not able to breathe as good and felt like she has been hit by a truck. Exam consistent with COPD exacerbation and acute respiratory failure. Admitted for IV abx, nebs, and 02.    I    Electronically signed by David Corcoran MD on 1/24/2023 at 1:03 PM

## 2023-01-25 PROBLEM — E11.9 TYPE 2 DIABETES MELLITUS (HCC): Status: ACTIVE | Noted: 2023-01-25

## 2023-01-25 PROBLEM — J96.01 ACUTE RESPIRATORY FAILURE WITH HYPOXIA (HCC): Status: ACTIVE | Noted: 2023-01-25

## 2023-01-25 PROBLEM — J18.9 ACUTE PNEUMONIA: Status: ACTIVE | Noted: 2023-01-25

## 2023-01-25 LAB
ALBUMIN SERPL-MCNC: 3.7 G/DL (ref 3.5–5.2)
ALP BLD-CCNC: 125 U/L (ref 35–104)
ALT SERPL-CCNC: 22 U/L (ref 5–33)
ANION GAP SERPL CALCULATED.3IONS-SCNC: 13 MMOL/L (ref 7–19)
AST SERPL-CCNC: 21 U/L (ref 5–32)
BASOPHILS ABSOLUTE: 0 K/UL (ref 0–0.2)
BASOPHILS RELATIVE PERCENT: 0.1 % (ref 0–1)
BILIRUB SERPL-MCNC: 0.5 MG/DL (ref 0.2–1.2)
BUN BLDV-MCNC: 12 MG/DL (ref 8–23)
CALCIUM SERPL-MCNC: 8.6 MG/DL (ref 8.8–10.2)
CHLORIDE BLD-SCNC: 104 MMOL/L (ref 98–111)
CO2: 22 MMOL/L (ref 22–29)
CREAT SERPL-MCNC: 1.2 MG/DL (ref 0.5–0.9)
EOSINOPHILS ABSOLUTE: 0 K/UL (ref 0–0.6)
EOSINOPHILS RELATIVE PERCENT: 0 % (ref 0–5)
GFR SERPL CREATININE-BSD FRML MDRD: 50 ML/MIN/{1.73_M2}
GLUCOSE BLD-MCNC: 156 MG/DL (ref 70–99)
GLUCOSE BLD-MCNC: 186 MG/DL (ref 70–99)
GLUCOSE BLD-MCNC: 205 MG/DL (ref 74–109)
GLUCOSE BLD-MCNC: 222 MG/DL (ref 70–99)
GLUCOSE BLD-MCNC: 284 MG/DL (ref 70–99)
HBA1C MFR BLD: 5.7 % (ref 4–6)
HCT VFR BLD CALC: 41.6 % (ref 37–47)
HEMOGLOBIN: 13.9 G/DL (ref 12–16)
IMMATURE GRANULOCYTES #: 0.1 K/UL
INR BLD: 1.31 (ref 0.88–1.18)
LYMPHOCYTES ABSOLUTE: 0.7 K/UL (ref 1.1–4.5)
LYMPHOCYTES RELATIVE PERCENT: 4.7 % (ref 20–40)
MCH RBC QN AUTO: 28.4 PG (ref 27–31)
MCHC RBC AUTO-ENTMCNC: 33.4 G/DL (ref 33–37)
MCV RBC AUTO: 85.1 FL (ref 81–99)
MONOCYTES ABSOLUTE: 0.2 K/UL (ref 0–0.9)
MONOCYTES RELATIVE PERCENT: 1.4 % (ref 0–10)
NEUTROPHILS ABSOLUTE: 13.7 K/UL (ref 1.5–7.5)
NEUTROPHILS RELATIVE PERCENT: 93.3 % (ref 50–65)
PDW BLD-RTO: 13.5 % (ref 11.5–14.5)
PERFORMED ON: ABNORMAL
PLATELET # BLD: 200 K/UL (ref 130–400)
PMV BLD AUTO: 11.1 FL (ref 9.4–12.3)
POTASSIUM REFLEX MAGNESIUM: 3.8 MMOL/L (ref 3.5–5)
PROTHROMBIN TIME: 16.3 SEC (ref 12–14.6)
RBC # BLD: 4.89 M/UL (ref 4.2–5.4)
SODIUM BLD-SCNC: 139 MMOL/L (ref 136–145)
TOTAL PROTEIN: 5.8 G/DL (ref 6.6–8.7)
WBC # BLD: 14.7 K/UL (ref 4.8–10.8)

## 2023-01-25 PROCEDURE — 85025 COMPLETE CBC W/AUTO DIFF WBC: CPT

## 2023-01-25 PROCEDURE — 2700000000 HC OXYGEN THERAPY PER DAY

## 2023-01-25 PROCEDURE — 94640 AIRWAY INHALATION TREATMENT: CPT

## 2023-01-25 PROCEDURE — 36415 COLL VENOUS BLD VENIPUNCTURE: CPT

## 2023-01-25 PROCEDURE — 82962 GLUCOSE BLOOD TEST: CPT

## 2023-01-25 PROCEDURE — 97165 OT EVAL LOW COMPLEX 30 MIN: CPT

## 2023-01-25 PROCEDURE — 83036 HEMOGLOBIN GLYCOSYLATED A1C: CPT

## 2023-01-25 PROCEDURE — 6360000002 HC RX W HCPCS: Performed by: FAMILY MEDICINE

## 2023-01-25 PROCEDURE — 6370000000 HC RX 637 (ALT 250 FOR IP): Performed by: FAMILY MEDICINE

## 2023-01-25 PROCEDURE — 85610 PROTHROMBIN TIME: CPT

## 2023-01-25 PROCEDURE — 2580000003 HC RX 258: Performed by: FAMILY MEDICINE

## 2023-01-25 PROCEDURE — 80053 COMPREHEN METABOLIC PANEL: CPT

## 2023-01-25 PROCEDURE — 1210000000 HC MED SURG R&B

## 2023-01-25 PROCEDURE — 97161 PT EVAL LOW COMPLEX 20 MIN: CPT

## 2023-01-25 RX ORDER — DEXTROSE MONOHYDRATE 100 MG/ML
INJECTION, SOLUTION INTRAVENOUS CONTINUOUS PRN
Status: DISCONTINUED | OUTPATIENT
Start: 2023-01-25 | End: 2023-01-27 | Stop reason: HOSPADM

## 2023-01-25 RX ORDER — CILOSTAZOL 100 MG/1
100 TABLET ORAL 2 TIMES DAILY
Status: DISCONTINUED | OUTPATIENT
Start: 2023-01-25 | End: 2023-01-27 | Stop reason: HOSPADM

## 2023-01-25 RX ORDER — INSULIN GLARGINE 100 [IU]/ML
10 INJECTION, SOLUTION SUBCUTANEOUS NIGHTLY
Status: DISCONTINUED | OUTPATIENT
Start: 2023-01-25 | End: 2023-01-27 | Stop reason: HOSPADM

## 2023-01-25 RX ORDER — INSULIN LISPRO 100 [IU]/ML
0-4 INJECTION, SOLUTION INTRAVENOUS; SUBCUTANEOUS NIGHTLY
Status: DISCONTINUED | OUTPATIENT
Start: 2023-01-25 | End: 2023-01-27 | Stop reason: HOSPADM

## 2023-01-25 RX ORDER — INSULIN LISPRO 100 [IU]/ML
0-8 INJECTION, SOLUTION INTRAVENOUS; SUBCUTANEOUS
Status: DISCONTINUED | OUTPATIENT
Start: 2023-01-25 | End: 2023-01-27 | Stop reason: HOSPADM

## 2023-01-25 RX ORDER — DEXTROSE MONOHYDRATE 100 MG/ML
INJECTION, SOLUTION INTRAVENOUS CONTINUOUS PRN
Status: DISCONTINUED | OUTPATIENT
Start: 2023-01-25 | End: 2023-01-25

## 2023-01-25 RX ORDER — PANTOPRAZOLE SODIUM 40 MG/1
40 TABLET, DELAYED RELEASE ORAL
Status: DISCONTINUED | OUTPATIENT
Start: 2023-01-25 | End: 2023-01-27 | Stop reason: HOSPADM

## 2023-01-25 RX ORDER — METHYLPREDNISOLONE SODIUM SUCCINATE 40 MG/ML
40 INJECTION, POWDER, LYOPHILIZED, FOR SOLUTION INTRAMUSCULAR; INTRAVENOUS EVERY 12 HOURS
Status: DISCONTINUED | OUTPATIENT
Start: 2023-01-25 | End: 2023-01-27 | Stop reason: HOSPADM

## 2023-01-25 RX ORDER — ATORVASTATIN CALCIUM 20 MG/1
20 TABLET, FILM COATED ORAL DAILY
Status: DISCONTINUED | OUTPATIENT
Start: 2023-01-25 | End: 2023-01-27 | Stop reason: HOSPADM

## 2023-01-25 RX ADMIN — SODIUM CHLORIDE, PRESERVATIVE FREE 1000 MG: 5 INJECTION INTRAVENOUS at 13:33

## 2023-01-25 RX ADMIN — VERAPAMIL HYDROCHLORIDE 240 MG: 120 TABLET, FILM COATED, EXTENDED RELEASE ORAL at 22:25

## 2023-01-25 RX ADMIN — IPRATROPIUM BROMIDE AND ALBUTEROL SULFATE 1 AMPULE: 2.5; .5 SOLUTION RESPIRATORY (INHALATION) at 19:44

## 2023-01-25 RX ADMIN — METHYLPREDNISOLONE SODIUM SUCCINATE 80 MG: 125 INJECTION, POWDER, FOR SOLUTION INTRAMUSCULAR; INTRAVENOUS at 04:54

## 2023-01-25 RX ADMIN — CILOSTAZOL 100 MG: 100 TABLET ORAL at 09:20

## 2023-01-25 RX ADMIN — AZITHROMYCIN MONOHYDRATE 500 MG: 500 INJECTION, POWDER, LYOPHILIZED, FOR SOLUTION INTRAVENOUS at 13:38

## 2023-01-25 RX ADMIN — METHYLPREDNISOLONE SODIUM SUCCINATE 40 MG: 40 INJECTION, POWDER, FOR SOLUTION INTRAMUSCULAR; INTRAVENOUS at 17:18

## 2023-01-25 RX ADMIN — SODIUM CHLORIDE: 9 INJECTION, SOLUTION INTRAVENOUS at 04:54

## 2023-01-25 RX ADMIN — INSULIN LISPRO 4 UNITS: 100 INJECTION, SOLUTION INTRAVENOUS; SUBCUTANEOUS at 12:15

## 2023-01-25 RX ADMIN — RIVAROXABAN 20 MG: 20 TABLET, FILM COATED ORAL at 09:20

## 2023-01-25 RX ADMIN — INSULIN LISPRO 2 UNITS: 100 INJECTION, SOLUTION INTRAVENOUS; SUBCUTANEOUS at 09:24

## 2023-01-25 RX ADMIN — IPRATROPIUM BROMIDE AND ALBUTEROL SULFATE 1 AMPULE: 2.5; .5 SOLUTION RESPIRATORY (INHALATION) at 10:21

## 2023-01-25 RX ADMIN — PANTOPRAZOLE SODIUM 40 MG: 40 TABLET, DELAYED RELEASE ORAL at 14:37

## 2023-01-25 RX ADMIN — ATORVASTATIN CALCIUM 20 MG: 20 TABLET, FILM COATED ORAL at 09:20

## 2023-01-25 RX ADMIN — METFORMIN HYDROCHLORIDE 500 MG: 500 TABLET, FILM COATED ORAL at 22:25

## 2023-01-25 RX ADMIN — CILOSTAZOL 100 MG: 100 TABLET ORAL at 22:25

## 2023-01-25 RX ADMIN — METFORMIN HYDROCHLORIDE 500 MG: 500 TABLET, FILM COATED ORAL at 09:20

## 2023-01-25 RX ADMIN — IPRATROPIUM BROMIDE AND ALBUTEROL SULFATE 1 AMPULE: 2.5; .5 SOLUTION RESPIRATORY (INHALATION) at 06:24

## 2023-01-25 ASSESSMENT — ENCOUNTER SYMPTOMS
COUGH: 1
DIARRHEA: 0
BACK PAIN: 0
ABDOMINAL PAIN: 0
VOMITING: 0
SHORTNESS OF BREATH: 1
CHEST TIGHTNESS: 1
NAUSEA: 0
SORE THROAT: 0

## 2023-01-25 NOTE — PROGRESS NOTES
4 Eyes Skin Assessment    Annette Huang is being assessed upon: Admission    I agree that Rose Mckeon, RN, along with Omayra Early RN (either 2 RN's or 1 LPN and 1 RN) have performed a thorough Head to Toe Skin Assessment on the patient. ALL assessment sites listed below have been assessed. Areas assessed by both nurses:     [x]   Head, Face, and Ears   [x]   Shoulders, Back, and Chest  [x]   Arms, Elbows, and Hands   [x]   Coccyx, Sacrum, and Ischium  [x]   Legs, Feet, and Heels    Does the Patient have Skin Breakdown?  No    Marlo Prevention initiated: NA  Wound Care Orders initiated: NA    WOC nurse consulted for Pressure Injury (Stage 3,4, Unstageable, DTI, NWPT, and Complex wounds) and New or Established Ostomies: NA        Primary Nurse eSignature: Ezio Redman RN on 1/24/2023 at 6:28 PM  Electronically signed by Omayra Early RN on 1/24/2023 at 6:36 PM     Co-Signer eSignature: Electronically signed by Ezio Redman RN on 1/24/23 at 6:29 PM CST

## 2023-01-25 NOTE — PROGRESS NOTES
Occupational Therapy  Facility/Department: [address] Initial Assessment    Name: Felicity Bray  : 1960  MRN: [id number]  Date of Service: 2023    Discharge Recommendations:             Patient Diagnosis(es): There were no encounter diagnoses. Past Medical History:  has a past medical history of Diabetes ([address] High cholesterol, and Vascular disease. Past Surgical History:  has a past surgical history that includes vascular surgery. Treatment Diagnosis: COPD exacerbation      Assessment   Assessment: Pt doing well with ADLs and mobility. No shortness of breath noted. OT eval only  Treatment Diagnosis: COPD exacerbation  Prognosis: Good  Decision Making: Low Complexity  REQUIRES OT FOLLOW-UP: No  Activity Tolerance  Activity Tolerance: Patient Tolerated treatment well        Plan   Occupational Therapy Plan  Times Per Week: OT eval only     Restrictions       Subjective   General  Chart Reviewed: Yes  Patient assessed for rehabilitation services?: Yes  Family / Caregiver Present: No  Diagnosis: COPD exacerbation  General Comment  Comments: Pt. states she could not breathe at MD office yesterday.    Does not use O2 at home but using 2 liters currently     Social/Functional History  Social/Functional History  Lives With: Spouse  Type of Home: House  Home Layout: One level  Home Access: Stairs to enter with rails  Entrance Stairs - Number of Steps: 4  Bathroom Shower/Tub: Tub/Shower unit  Bathroom Toilet: Standard  Bathroom Equipment: Grab bars in shower  Receives Help From: Family  ADL Assistance: Independent  Homemaking Assistance: Independent  Homemaking Responsibilities: Yes  Ambulation Assistance: Independent  Transfer Assistance: Independent  Active : Yes  Mode of Transportation: Car       Objective   Heart Rate: 99  Heart Rate Source: Monitor  BP: 123/61  BP Location: Right upper arm  MAP (Calculated): 82  Resp: 20  SpO2: 94 %  O2 Device: Nasal cannula AROM: Within functional limits  Strength:  Within functional limits  ADL  Feeding: Independent  Grooming: Independent  UE Bathing: Supervision  LE Bathing: Supervision  UE Dressing: Supervision  LE Dressing: Supervision  Toileting: Supervision        Bed mobility  Supine to Sit: Independent  Sit to Supine: Independent  Transfers  Stand Step Transfers: Supervision  Sit to stand: Supervision  Vision  Vision: Within Functional Limits  Cognition  Overall Cognitive Status: WNL  Orientation  Overall Orientation Status: Within Normal Limits                                           G-Code     OutComes Score                                                  AM-PAC Score             Tinneti Score       Goals  Short Term Goals  Short Term Goal 1: OT eval only  Long Term Goals  Time Frame for Long Term Goals : OT eval only       Therapy Time   Individual Concurrent Group Co-treatment   Time In           Time Out           Minutes                   Sma Frederick OT

## 2023-01-25 NOTE — PROGRESS NOTES
[facility]ALU[facility]      Bindu Choi    : 1960  MRN: [id number]   PHYSICIAN:  Stefanie Tai MD  Primary Problem    Patient Active Problem List   Diagnosis    COPD exacerbation ([facility]    Acute pneumonia    Type 2 diabetes mellitus ([facility]    Acute respiratory failure with hypoxia [facility])       Rehabilitation Diagnosis:     COPD exacerbation ([facility] [J44.1]       SERVICE DATE: 2023        SUBJECTIVE: Patient agrees that they are safe with mobility and do not require physical therapy services. Feeling better, hoping for home soon. Pain: No complaint of pain    OBJECTIVE:    Orientation is Within normal limits     O2 @ 2L        ACTIVE ROM:       All major lower extremity joints are within functional limits      STRENGTH     Both lower extremities are grossly within functional limits. TRANSFERS   Sit to stand   Independent      Bed to chair   Independent     Bed to mobility   Supine to sit   Independent       Roll     Independent     Scoot Side to side / Up and down   Independent    AMBULATION   Distance: Functional distances in room     Device: NONE     Assistance: Independent       Comment:    BALANCE   Sitting    Good     Standing    Good    ASSESSMENT      Independent and safe with all functional mobility. No skilled physical therapy needs identified at this time. PLAN: Discharge from skilled physical therapy. Nursing has been updated.       GOALS   Independent and safe with all functional mobility (MET)            Electronically signed by Kade Wylie PT on 2023 at 9:10 AM

## 2023-01-25 NOTE — PLAN OF CARE
Problem: Safety - Adult  Goal: Free from fall injury  1/24/2023 2308 by Rola Aguero RN  Outcome: Progressing  Flowsheets (Taken 1/24/2023 2307)  Free From Fall Injury: Based on caregiver fall risk screen, instruct family/caregiver to ask for assistance with transferring infant if caregiver noted to have fall risk factors  1/24/2023 1834 by Jennifer Josue, RN  Outcome: Progressing

## 2023-01-25 NOTE — H&P
History and Physical    Patient:  Kavin Guzman  MRN: [id number]    CHIEF COMPLAINT: Shortness of breath      PCP: Jordan Robledo MD    HISTORY OF PRESENT ILLNESS:   The patient is a 70 y.o. female with COPD, vascular disease, diabetes who presents from the office with acute shortness of breath and hypoxia. She has been sick since Saturday with worsening symptoms of shortness of breath. Found to be significantly hypoxic in the office and was directly admitted. She has underlying COPD from 35+ pack year smoking history but no longer smokes. Does endorse increased cough and sputum production. Endorses significant shortness of breath on exertion. Past Medical History:      Diagnosis Date    Diabetes ([facility]     High cholesterol     Vascular disease        Past Surgical History:      Procedure Laterality Date    VASCULAR SURGERY         Medications Prior to Admission:    Prior to Admission medications    Medication Sig Start Date End Date Taking?  Authorizing Provider   acetaminophen (TYLENOL) 500 MG tablet Take 1,000 mg by mouth every 6 hours as needed for Pain   Yes Historical Provider, MD   atorvastatin (LIPITOR) 20 MG tablet Take 20 mg by mouth daily    Historical Provider, MD   cilostazol (PLETAL) 100 MG tablet Take 100 mg by mouth 2 times daily    Historical Provider, MD   metFORMIN (GLUCOPHAGE) 500 MG tablet Take 1 tablet by mouth 2 times daily 1/17/20   Historical Provider, MD   rivaroxaban (XARELTO) 20 MG TABS tablet Take 20 mg by mouth daily    Historical Provider, MD   verapamil (CALAN SR) 240 MG extended release tablet Take 240 mg by mouth nightly    Historical Provider, MD       Allergies:  Codeine, Dye [barium-containing compounds], and Sulfa antibiotics    Social History:   Social History     Socioeconomic History    Marital status:      Spouse name: Not on file    Number of children: Not on file    Years of education: Not on file    Highest education level: Not on file Occupational History    Not on file   Tobacco Use    Smoking status: Never    Smokeless tobacco: Never   Substance and Sexual Activity    Alcohol use: Never    Drug use: Not on file    Sexual activity: Not on file   Other Topics Concern    Not on file   Social History Narrative    Not on file     Social Determinants of Health     Financial Resource Strain: Not on file   Food Insecurity: Not on file   Transportation Needs: Not on file   Physical Activity: Not on file   Stress: Not on file   Social Connections: Not on file   Intimate Partner Violence: Not on file   Housing Stability: Not on file       Family History:   No family history on file. Review of systems:  Review of Systems   Constitutional:  Negative for chills and fever. HENT:  Positive for congestion. Negative for sore throat. Respiratory:  Positive for cough, chest tightness and shortness of breath. Cardiovascular:  Negative for chest pain, palpitations and leg swelling. Gastrointestinal:  Negative for abdominal pain, diarrhea, nausea and vomiting. Genitourinary:  Negative for dysuria and urgency. Musculoskeletal:  Negative for back pain and neck pain. Skin:  Negative for pallor and rash. Neurological:  Negative for syncope and weakness. Psychiatric/Behavioral:  Negative for agitation and confusion. A full 14 point review of systems is otherwise negative outside listed above and HPI      Physical Exam:        Physical Exam  Constitutional:       Appearance: Normal appearance. HENT:      Head: Normocephalic and atraumatic. Nose: Nose normal.      Mouth/Throat:      Mouth: Mucous membranes are moist.   Eyes:      Extraocular Movements: Extraocular movements intact. Pupils: Pupils are equal, round, and reactive to light. Cardiovascular:      Rate and Rhythm: Normal rate and regular rhythm. Heart sounds: No murmur heard. Pulmonary:      Effort: Pulmonary effort is normal. No respiratory distress.       Breath sounds: Wheezing and rhonchi present. Abdominal:      General: Abdomen is flat. There is no distension. Palpations: Abdomen is soft. Musculoskeletal:         General: Normal range of motion. Cervical back: Normal range of motion and neck supple. Skin:     General: Skin is warm. Capillary Refill: Capillary refill takes less than 2 seconds. Neurological:      General: No focal deficit present. Mental Status: She is alert. Psychiatric:         Mood and Affect: Mood normal.               CBC:   Recent Labs     01/25/23 0139   WBC 14.7*   HGB 13.9        BMP:    Recent Labs     01/25/23 0139      K 3.8      CO2 22   BUN 12   CREATININE 1.2*   GLUCOSE 205*     Hepatic:   Recent Labs     01/25/23 0139   AST 21   ALT 22   BILITOT 0.5   ALKPHOS 125*     Troponin T: No results for input(s): TROPONINI in the last 72 hours. Pro-BNP: No results for input(s): BNP in the last 72 hours. Lipids: No results for input(s): CHOL, HDL in the last 72 hours. Invalid input(s): LDLCALCU  ABGs: No results found for: PHART, PO2ART, YSR2DYW  INR:   Recent Labs     01/25/23 0139   INR 1.31*     -----------------------------------------------------------------    Radiology:     XR CHEST (2 VW)    Result Date: 1/24/2023  Examination: Chest X-Ray, 2 views Clinical history:Acute respiratory failure Comparison: None Findings: Questionable vague bibasilar opacities may represent atelectasis or infiltrates. No pleural effusion or pneumothorax is identified. The cardiomediastinal silhouette and pulmonary vasculature appear within normal limits. No acute or suspicious osseous lesion is identified. Impression: Questionable vague bibasilar opacities may represent atelectasis or infiltrates. Assessment and Plan   Acute respiratory failure with hypoxia  Secondary to COPD exacerbation and underlying pneumonia.   Started on broad-spectrum antibiotics with Rocephin and azithromycin as well as steroids. Gradually improving. Wean oxygen as able. COPD exacerbation  Antibiotics and steroids as above. DuoNebs while awake. De-escalate steroids to 40 mg Solu-Medrol twice daily given similar efficacy and less side effects. Type 2 diabetes mellitus  Resume home metformin. Add sliding scale insulin. May need basal insulin while on steroids. Acute pneumonia  Bilateral pneumonia on chest x-ray. Broad-spectrum antibiotics for community-acquired pneumonia as above. Disposition: Inpatient    CODE STATUS: Full    DVT prophylaxis: Xarelto    Principal Problem:    COPD exacerbation ([facility]  Active Problems:    Acute pneumonia    Type 2 diabetes mellitus ([facility]  Resolved Problems:    * No resolved hospital problems.  Inga Mccarty MD  1/25/2023 8:18 AM

## 2023-01-25 NOTE — PROGRESS NOTES
Automatic Dose Adjustment of                Subcutaneous Anticoagulant for Prophylaxis    Nicolle Camarillo is a 70 y.o. female. No results for input(s): CREATININE in the last 72 hours. CrCl cannot be calculated (No successful lab value found. ). Weight:  Wt Readings from Last 1 Encounters:   01/24/23 228 lb 4.8 oz (103.6 kg)           Pharmacy has adjusted subcutaneous anticoagulant for prophylaxis to Enoxaparin 30 mg SC twice daily based on the patient's weight and estimated CrCl per [facility] policy.                Electronically signed by BJ Cortez [facility] on 1/24/2023 at 6:35 PM

## 2023-01-26 LAB
ALBUMIN SERPL-MCNC: 3 G/DL (ref 3.5–5.2)
ALP BLD-CCNC: 92 U/L (ref 35–104)
ALT SERPL-CCNC: 20 U/L (ref 5–33)
ANION GAP SERPL CALCULATED.3IONS-SCNC: 11 MMOL/L (ref 7–19)
AST SERPL-CCNC: 18 U/L (ref 5–32)
BASOPHILS ABSOLUTE: 0 K/UL (ref 0–0.2)
BASOPHILS RELATIVE PERCENT: 0.2 % (ref 0–1)
BILIRUB SERPL-MCNC: <0.2 MG/DL (ref 0.2–1.2)
BUN BLDV-MCNC: 18 MG/DL (ref 8–23)
CALCIUM SERPL-MCNC: 8.2 MG/DL (ref 8.8–10.2)
CHLORIDE BLD-SCNC: 108 MMOL/L (ref 98–111)
CO2: 23 MMOL/L (ref 22–29)
CREAT SERPL-MCNC: 1 MG/DL (ref 0.5–0.9)
EOSINOPHILS ABSOLUTE: 0 K/UL (ref 0–0.6)
EOSINOPHILS RELATIVE PERCENT: 0 % (ref 0–5)
GFR SERPL CREATININE-BSD FRML MDRD: >60 ML/MIN/{1.73_M2}
GLUCOSE BLD-MCNC: 116 MG/DL (ref 70–99)
GLUCOSE BLD-MCNC: 143 MG/DL (ref 70–99)
GLUCOSE BLD-MCNC: 157 MG/DL (ref 70–99)
GLUCOSE BLD-MCNC: 167 MG/DL (ref 74–109)
GLUCOSE BLD-MCNC: 192 MG/DL (ref 70–99)
HCT VFR BLD CALC: 35.5 % (ref 37–47)
HEMOGLOBIN: 11.7 G/DL (ref 12–16)
IMMATURE GRANULOCYTES #: 0.6 K/UL
LYMPHOCYTES ABSOLUTE: 1.2 K/UL (ref 1.1–4.5)
LYMPHOCYTES RELATIVE PERCENT: 6 % (ref 20–40)
MCH RBC QN AUTO: 28.6 PG (ref 27–31)
MCHC RBC AUTO-ENTMCNC: 33 G/DL (ref 33–37)
MCV RBC AUTO: 86.8 FL (ref 81–99)
MONOCYTES ABSOLUTE: 1.2 K/UL (ref 0–0.9)
MONOCYTES RELATIVE PERCENT: 5.9 % (ref 0–10)
NEUTROPHILS ABSOLUTE: 16.9 K/UL (ref 1.5–7.5)
NEUTROPHILS RELATIVE PERCENT: 85.1 % (ref 50–65)
PDW BLD-RTO: 13.8 % (ref 11.5–14.5)
PERFORMED ON: ABNORMAL
PLATELET # BLD: 174 K/UL (ref 130–400)
PMV BLD AUTO: 11.2 FL (ref 9.4–12.3)
POTASSIUM REFLEX MAGNESIUM: 3.8 MMOL/L (ref 3.5–5)
RBC # BLD: 4.09 M/UL (ref 4.2–5.4)
SODIUM BLD-SCNC: 142 MMOL/L (ref 136–145)
TOTAL PROTEIN: 5.6 G/DL (ref 6.6–8.7)
WBC # BLD: 19.9 K/UL (ref 4.8–10.8)

## 2023-01-26 PROCEDURE — 82962 GLUCOSE BLOOD TEST: CPT

## 2023-01-26 PROCEDURE — 1210000000 HC MED SURG R&B

## 2023-01-26 PROCEDURE — 80053 COMPREHEN METABOLIC PANEL: CPT

## 2023-01-26 PROCEDURE — 6360000002 HC RX W HCPCS: Performed by: FAMILY MEDICINE

## 2023-01-26 PROCEDURE — 2580000003 HC RX 258: Performed by: FAMILY MEDICINE

## 2023-01-26 PROCEDURE — 94760 N-INVAS EAR/PLS OXIMETRY 1: CPT

## 2023-01-26 PROCEDURE — A4216 STERILE WATER/SALINE, 10 ML: HCPCS | Performed by: FAMILY MEDICINE

## 2023-01-26 PROCEDURE — 36415 COLL VENOUS BLD VENIPUNCTURE: CPT

## 2023-01-26 PROCEDURE — 85025 COMPLETE CBC W/AUTO DIFF WBC: CPT

## 2023-01-26 PROCEDURE — 6370000000 HC RX 637 (ALT 250 FOR IP): Performed by: FAMILY MEDICINE

## 2023-01-26 PROCEDURE — 2700000000 HC OXYGEN THERAPY PER DAY

## 2023-01-26 PROCEDURE — 94640 AIRWAY INHALATION TREATMENT: CPT

## 2023-01-26 RX ADMIN — RIVAROXABAN 20 MG: 20 TABLET, FILM COATED ORAL at 10:20

## 2023-01-26 RX ADMIN — ATORVASTATIN CALCIUM 20 MG: 20 TABLET, FILM COATED ORAL at 10:20

## 2023-01-26 RX ADMIN — IPRATROPIUM BROMIDE AND ALBUTEROL SULFATE 1 AMPULE: 2.5; .5 SOLUTION RESPIRATORY (INHALATION) at 14:32

## 2023-01-26 RX ADMIN — VERAPAMIL HYDROCHLORIDE 240 MG: 120 TABLET, FILM COATED, EXTENDED RELEASE ORAL at 20:48

## 2023-01-26 RX ADMIN — SODIUM CHLORIDE, PRESERVATIVE FREE 10 ML: 5 INJECTION INTRAVENOUS at 10:21

## 2023-01-26 RX ADMIN — ONDANSETRON 4 MG: 2 INJECTION INTRAMUSCULAR; INTRAVENOUS at 17:30

## 2023-01-26 RX ADMIN — PANTOPRAZOLE SODIUM 40 MG: 40 TABLET, DELAYED RELEASE ORAL at 17:29

## 2023-01-26 RX ADMIN — METFORMIN HYDROCHLORIDE 500 MG: 500 TABLET, FILM COATED ORAL at 20:47

## 2023-01-26 RX ADMIN — AZITHROMYCIN MONOHYDRATE 500 MG: 500 INJECTION, POWDER, LYOPHILIZED, FOR SOLUTION INTRAVENOUS at 16:02

## 2023-01-26 RX ADMIN — SODIUM CHLORIDE: 9 INJECTION, SOLUTION INTRAVENOUS at 10:21

## 2023-01-26 RX ADMIN — INSULIN GLARGINE 10 UNITS: 100 INJECTION, SOLUTION SUBCUTANEOUS at 20:46

## 2023-01-26 RX ADMIN — METFORMIN HYDROCHLORIDE 500 MG: 500 TABLET, FILM COATED ORAL at 10:20

## 2023-01-26 RX ADMIN — PANTOPRAZOLE SODIUM 40 MG: 40 TABLET, DELAYED RELEASE ORAL at 10:20

## 2023-01-26 RX ADMIN — IPRATROPIUM BROMIDE AND ALBUTEROL SULFATE 1 AMPULE: 2.5; .5 SOLUTION RESPIRATORY (INHALATION) at 10:32

## 2023-01-26 RX ADMIN — SODIUM CHLORIDE, PRESERVATIVE FREE 10 ML: 5 INJECTION INTRAVENOUS at 17:30

## 2023-01-26 RX ADMIN — SODIUM CHLORIDE, PRESERVATIVE FREE 1000 MG: 5 INJECTION INTRAVENOUS at 16:02

## 2023-01-26 RX ADMIN — CILOSTAZOL 100 MG: 100 TABLET ORAL at 10:20

## 2023-01-26 RX ADMIN — METHYLPREDNISOLONE SODIUM SUCCINATE 40 MG: 40 INJECTION, POWDER, FOR SOLUTION INTRAMUSCULAR; INTRAVENOUS at 17:29

## 2023-01-26 RX ADMIN — CILOSTAZOL 100 MG: 100 TABLET ORAL at 20:45

## 2023-01-26 RX ADMIN — SODIUM CHLORIDE, PRESERVATIVE FREE 10 ML: 5 INJECTION INTRAVENOUS at 20:47

## 2023-01-26 RX ADMIN — IPRATROPIUM BROMIDE AND ALBUTEROL SULFATE 1 AMPULE: 2.5; .5 SOLUTION RESPIRATORY (INHALATION) at 06:42

## 2023-01-26 RX ADMIN — IPRATROPIUM BROMIDE AND ALBUTEROL SULFATE 1 AMPULE: 2.5; .5 SOLUTION RESPIRATORY (INHALATION) at 19:52

## 2023-01-26 RX ADMIN — INSULIN GLARGINE 10 UNITS: 100 INJECTION, SOLUTION SUBCUTANEOUS at 21:00

## 2023-01-26 ASSESSMENT — ENCOUNTER SYMPTOMS
COUGH: 1
SHORTNESS OF BREATH: 1
ABDOMINAL PAIN: 0

## 2023-01-26 NOTE — PROGRESS NOTES
Daily Progress Note  Ricco Richmond  MRN: [id number] LOS: 2    Admit Date: 1/24/2023 1/26/2023 7:11 AM    Subjective:          Chief Complaint:  Shortness of breath    Interval History:    Reviewed overnight events and nursing notes. \  Patient reports improvement in shortness of breath. Continues to have some cough especially when neb treatments. Review of Systems   Constitutional:  Negative for chills and fever. Respiratory:  Positive for cough and shortness of breath. Cardiovascular:  Negative for chest pain. Gastrointestinal:  Negative for abdominal pain. DIET:  ADULT DIET; Regular    Medications:      dextrose      sodium chloride      sodium chloride 75 mL/hr at 01/25/23 0454      atorvastatin  20 mg Oral Daily    cilostazol  100 mg Oral BID    metFORMIN  500 mg Oral BID    rivaroxaban  20 mg Oral Daily    verapamil  240 mg Oral Nightly    insulin lispro  0-8 Units SubCUTAneous TID WC    insulin lispro  0-4 Units SubCUTAneous Nightly    methylPREDNISolone  40 mg IntraVENous Q12H    pantoprazole  40 mg Oral BID AC    insulin glargine  10 Units SubCUTAneous Nightly    sodium chloride flush  5-40 mL IntraVENous 2 times per day    cefTRIAXone (ROCEPHIN) IV  1,000 mg IntraVENous Q24H    azithromycin  500 mg IntraVENous Q24H    ipratropium-albuterol  1 ampule Inhalation Q4H WA       Data:     Code Status: Full Code    No family history on file.   Social History     Socioeconomic History    Marital status:      Spouse name: Not on file    Number of children: Not on file    Years of education: Not on file    Highest education level: Not on file   Occupational History    Not on file   Tobacco Use    Smoking status: Never    Smokeless tobacco: Never   Substance and Sexual Activity    Alcohol use: Never    Drug use: Not on file    Sexual activity: Not on file   Other Topics Concern    Not on file   Social History Narrative    Not on file     Social Determinants of Health     Financial Resource Strain: Not on file   Food Insecurity: Not on file   Transportation Needs: Not on file   Physical Activity: Not on file   Stress: Not on file   Social Connections: Not on file   Intimate Partner Violence: Not on file   Housing Stability: Not on file       Labs:  CBC:   Recent Labs     23   WBC 14.7* 19.9*   HGB 13.9 11.7*    174     BMP:    Recent Labs     23    142   K 3.8 3.8    108   CO2 22 23   BUN 12 18   CREATININE 1.2* 1.0*   GLUCOSE 205* 167*     Hepatic:   Recent Labs     23   AST 21 18   ALT 22 20   BILITOT 0.5 <0.2   ALKPHOS 125* 92     Lipids: No results for input(s): CHOL, HDL in the last 72 hours. Invalid input(s): LDLCALCU  INR:   Recent Labs     23   INR 1.31*       Objective:     Vitals: BP (!) 108/54   Pulse 92   Temp 97.1 °F (36.2 °C) (Temporal)   Resp 17   Wt 228 lb 4.8 oz (103.6 kg)   SpO2 92%   BMI 33.71 kg/m²    Intake/Output Summary (Last 24 hours) at 2023 0711  Last data filed at 2023 0501  Gross per 24 hour   Intake 240 ml   Output --   Net 240 ml    Temp (24hrs), Av.6 °F (36.4 °C), Min:96.6 °F (35.9 °C), Max:98.2 °F (36.8 °C)    Glucose:  Recent Labs     23  0918 23  1210 23  1714 23  2113   POCGLU 222* 284* 156* 186*       Physical Exam  Constitutional:       Appearance: Normal appearance. HENT:      Head: Normocephalic and atraumatic. Nose: Nose normal.      Mouth/Throat:      Mouth: Mucous membranes are moist.   Eyes:      Extraocular Movements: Extraocular movements intact. Pupils: Pupils are equal, round, and reactive to light. Cardiovascular:      Rate and Rhythm: Normal rate and regular rhythm. Heart sounds: No murmur heard. Pulmonary:      Effort: Pulmonary effort is normal. No respiratory distress. Breath sounds: Rhonchi present. No wheezing. Abdominal:      General: Abdomen is flat.  There is no distension. Palpations: Abdomen is soft. Musculoskeletal:         General: Normal range of motion. Cervical back: Normal range of motion and neck supple. Skin:     General: Skin is warm. Capillary Refill: Capillary refill takes less than 2 seconds. Neurological:      General: No focal deficit present. Mental Status: She is alert. Psychiatric:         Mood and Affect: Mood normal.         Assessment and Plan:     Primary Problem:  COPD exacerbation [facility]    Hospital Problem list:  Principal Problem:    COPD exacerbation ([facility]  Active Problems:    Acute pneumonia    Type 2 diabetes mellitus ([facility]    Acute respiratory failure with hypoxia ([facility]  Resolved Problems:    * No resolved hospital problems. *      PMH:  Past Medical History:   Diagnosis Date    Diabetes ([facility]     High cholesterol     Vascular disease        Treatment Plan:  Acute respiratory failure with hypoxia  Secondary to COPD exacerbation and underlying pneumonia. Started on broad-spectrum antibiotics with Rocephin and azithromycin as well as steroids. Gradually improving. Wean oxygen as able. COPD exacerbation  Antibiotics and steroids as above. DuoNebs while awake. Type 2 diabetes mellitus  Resume home metformin. Add sliding scale insulin. May need basal insulin while on steroids. Acute pneumonia  Bilateral pneumonia on chest x-ray. Broad-spectrum antibiotics for community-acquired pneumonia as above. Disposition: Continue inpatient care while requiring oxygen, plan for discharge home when able to wean oxygen    Reviewed treatment plans with the patient and/or family. 30 minutes spent in face to face interaction and coordination of care.      Electronically signed by Pallavi Baez MD on 1/26/2023 at 7:11 AM

## 2023-01-27 VITALS
BODY MASS INDEX: 34.36 KG/M2 | SYSTOLIC BLOOD PRESSURE: 115 MMHG | HEART RATE: 101 BPM | DIASTOLIC BLOOD PRESSURE: 96 MMHG | TEMPERATURE: 96.8 F | WEIGHT: 232 LBS | HEIGHT: 69 IN | RESPIRATION RATE: 16 BRPM | OXYGEN SATURATION: 91 %

## 2023-01-27 LAB
GLUCOSE BLD-MCNC: 138 MG/DL (ref 70–99)
PERFORMED ON: ABNORMAL

## 2023-01-27 PROCEDURE — 94640 AIRWAY INHALATION TREATMENT: CPT

## 2023-01-27 PROCEDURE — 2580000003 HC RX 258: Performed by: FAMILY MEDICINE

## 2023-01-27 PROCEDURE — 6370000000 HC RX 637 (ALT 250 FOR IP): Performed by: FAMILY MEDICINE

## 2023-01-27 PROCEDURE — 94760 N-INVAS EAR/PLS OXIMETRY 1: CPT

## 2023-01-27 PROCEDURE — 82962 GLUCOSE BLOOD TEST: CPT

## 2023-01-27 PROCEDURE — 6360000002 HC RX W HCPCS: Performed by: FAMILY MEDICINE

## 2023-01-27 RX ORDER — PREDNISONE 20 MG/1
40 TABLET ORAL DAILY
Qty: 6 TABLET | Refills: 0 | Status: SHIPPED | OUTPATIENT
Start: 2023-01-27 | End: 2023-01-30

## 2023-01-27 RX ORDER — DOXYCYCLINE HYCLATE 100 MG
100 TABLET ORAL 2 TIMES DAILY
Qty: 10 TABLET | Refills: 0 | Status: SHIPPED | OUTPATIENT
Start: 2023-01-27 | End: 2023-02-01

## 2023-01-27 RX ADMIN — IPRATROPIUM BROMIDE AND ALBUTEROL SULFATE 1 AMPULE: 2.5; .5 SOLUTION RESPIRATORY (INHALATION) at 06:42

## 2023-01-27 RX ADMIN — METHYLPREDNISOLONE SODIUM SUCCINATE 40 MG: 40 INJECTION, POWDER, FOR SOLUTION INTRAMUSCULAR; INTRAVENOUS at 05:55

## 2023-01-27 RX ADMIN — SODIUM CHLORIDE: 9 INJECTION, SOLUTION INTRAVENOUS at 03:49

## 2023-01-27 RX ADMIN — METFORMIN HYDROCHLORIDE 500 MG: 500 TABLET, FILM COATED ORAL at 10:56

## 2023-01-27 RX ADMIN — PANTOPRAZOLE SODIUM 40 MG: 40 TABLET, DELAYED RELEASE ORAL at 05:54

## 2023-01-27 RX ADMIN — IPRATROPIUM BROMIDE AND ALBUTEROL SULFATE 1 AMPULE: 2.5; .5 SOLUTION RESPIRATORY (INHALATION) at 10:38

## 2023-01-27 RX ADMIN — ATORVASTATIN CALCIUM 20 MG: 20 TABLET, FILM COATED ORAL at 10:56

## 2023-01-27 NOTE — DISCHARGE SUMMARY
Discharge Summary    Patient ID: Felicity Bray  MRN: [id number]     Acct:  [de-identified]       Patient's PCP: Max Gomez MD    Admit Date: 1/24/2023     Discharge Date:   1/27/2023    Admitting Physician: Max Gomez MD    Discharge Physician: Alma Tamayo MD     Active Discharge Diagnoses:    Primary Problem  COPD exacerbation [facility])  [place] Problems    Diagnosis Date Noted    Acute pneumonia [J18.9] 01/25/2023     Priority: Medium    Type 2 diabetes mellitus ([address] [E11.9] 01/25/2023     Priority: Medium    Acute respiratory failure with hypoxia ([address] [J96.01] 01/25/2023     Priority: Medium    COPD exacerbation ([address] [J44.1] 01/24/2023     Priority: Medium       The patient was seen and examined on day of discharge and this discharge summary is in conjunction with the daily progress note from day of discharge. Code Status:  Full Code    Hospital Course: 68-year-old female with known COPD presented with acute respiratory failure to the office. Found to be hypoxic and was directly admitted for further management. Chest x-ray showed bilateral lower lobe infiltrates consistent with community-acquired pneumonia. She was placed on antibiotics and steroids for her COPD exacerbation with gradual improvement of symptoms. She was able to be weaned off of oxygen after 48 hours. She will discharge home in stable condition to complete 5 more days of antibiotics with doxycycline as she is penicillin allergic as well as 3 more days of steroids. Follow-up closely in our office next week. May warrant adjustment of inhaler regimen to prevent further exacerbations. The patient was admitted for the above noted medical/surgical issues. Please see daily progress note for further details concerning their stay. The patient improved throughout their stay and reached maximum medical improvement on the day of discharge.  The patient/family agree with the treatment plan as outlined above. All questions concerning their stay were answered prior to discharge. They understand the importance of follow up concerning any abnormal test results. Physical Exam  Constitutional:       Appearance: Normal appearance. HENT:      Head: Normocephalic and atraumatic. Nose: Nose normal.      Mouth/Throat:      Mouth: Mucous membranes are moist.   Eyes:      Extraocular Movements: Extraocular movements intact. Pupils: Pupils are equal, round, and reactive to light. Cardiovascular:      Rate and Rhythm: Normal rate and regular rhythm. Heart sounds: No murmur heard. Pulmonary:      Effort: Pulmonary effort is normal. No respiratory distress. Breath sounds: Normal breath sounds. Abdominal:      General: Abdomen is flat. There is no distension. Palpations: Abdomen is soft. Musculoskeletal:         General: Normal range of motion. Cervical back: Normal range of motion and neck supple. Skin:     General: Skin is warm. Capillary Refill: Capillary refill takes less than 2 seconds. Neurological:      General: No focal deficit present. Mental Status: She is alert.    Psychiatric:         Mood and Affect: Mood normal.      Consults:  None    Disposition: Home    Discharged Condition: Stable    Follow Up: Baltazar Sales MD  [address] [id number] [phone]    Follow up in 1 week(s)      Baltazar Sales MD  [address] [place] [address]  [phone]          Lab Frequency Next Occurrence   Up with assistance PRN    Intake and output EVERY 8 HOURS    Initiate Oxygen Therapy Protocol PRN    Pulse Oximetry Spot Check PRN    Nasal Cannula Oxygen DAILY (RT)    POCT glucose 4 TIMES DAILY BEFORE MEALS & AT BEDTIME    HYPOGLYCEMIA TREATMENT: blood glucose LESS THAN 70 mg/dL and patient ALERT and TOLERATING PO PRN    HYPOGLYCEMIA TREATMENT: blood glucose LESS THAN 70 mg/dL and patient NOT ALERT or NPO PRN    POCT Glucose PRN    POCT glucose 4 TIMES DAILY BEFORE MEALS & AT BEDTIME    HYPOGLYCEMIA TREATMENT: blood glucose LESS THAN 70 mg/dL and patient ALERT and TOLERATING PO PRN    HYPOGLYCEMIA TREATMENT: blood glucose LESS THAN 70 mg/dL and patient NOT ALERT or NPO PRN    POCT Glucose PRN        Diet: ADULT DIET; Regular    Discharge Medications:      Medication List        START taking these medications      doxycycline hyclate 100 MG tablet  Commonly known as: VIBRA-TABS  Take 1 tablet by mouth 2 times daily for 5 days     predniSONE 20 MG tablet  Commonly known as: DELTASONE  Take 2 tablets by mouth daily for 3 days            CONTINUE taking these medications      acetaminophen 500 MG tablet  Commonly known as: TYLENOL     atorvastatin 20 MG tablet  Commonly known as: LIPITOR     cilostazol 100 MG tablet  Commonly known as: PLETAL     metFORMIN 500 MG tablet  Commonly known as: GLUCOPHAGE     rivaroxaban 20 MG Tabs tablet  Commonly known as: XARELTO     verapamil 240 MG extended release tablet  Commonly known as: CALAN SR               Where to Get Your Medications        These medications were sent to [address] [address]      Phone: [phone]   doxycycline hyclate 100 MG tablet  predniSONE 20 MG tablet         Time Spent on discharge is 25 minutes in patient examination, evaluation, counseling as well as medication reconciliation, prescriptions for required medications, discharge planning and follow up.     Electronically signed by Randy Patino MD on 1/27/2023 at 8:15 AM

## 2023-01-27 NOTE — PLAN OF CARE
PA submitted for XIFAXAN 550 MG TABLETS on CMM  (Key: [id number])     Check back for questions. Problem: Chronic Conditions and Co-morbidities  Goal: Patient's chronic conditions and co-morbidity symptoms are monitored and maintained or improved  Recent Flowsheet Documentation  Taken 1/26/2023 2034 by Ronnell Roberto RN  Care Plan - Patient's Chronic Conditions and Co-Morbidity Symptoms are Monitored and Maintained or Improved: Monitor and assess patient's chronic conditions and comorbid symptoms for stability, deterioration, or improvement     Problem: ABCDS Injury Assessment  Goal: Absence of physical injury  Outcome: Progressing

## 2023-01-27 NOTE — CARE COORDINATION
01/27/23 0815   IMM Letter   IMM Letter given to Patient/Family/Significant other/Guardian/POA/by: Given to and explained to patient by Aravind Dobbs RN CM. Patient verbalized understanding. Patient chose to not stay additional 4 hours.    IMM Letter date given: 01/27/23   IMM Letter time given: 0815

## 2023-01-27 NOTE — PLAN OF CARE
Problem: Safety - Adult  Goal: Free from fall injury  Outcome: Adequate for Discharge     Problem: Chronic Conditions and Co-morbidities  Goal: Patient's chronic conditions and co-morbidity symptoms are monitored and maintained or improved  Outcome: Adequate for Discharge  Flowsheets (Taken 1/26/2023 2034 by Sydnie Mccarthy RN)  Care Plan - Patient's Chronic Conditions and Co-Morbidity Symptoms are Monitored and Maintained or Improved: Monitor and assess patient's chronic conditions and comorbid symptoms for stability, deterioration, or improvement     Problem: ABCDS Injury Assessment  Goal: Absence of physical injury  1/27/2023 0948 by Rosalia Pettit RN  Outcome: Adequate for Discharge  1/26/2023 2230 by Sydnie Mccarthy RN  Outcome: Progressing

## 2023-01-29 LAB
BLOOD CULTURE, ROUTINE: NORMAL
CULTURE, BLOOD 2: NORMAL

## 2023-02-01 NOTE — PROGRESS NOTES
Physician Progress Note      PATIENTMitzy FLETCHER #:                  [id number]  :                       1960  ADMIT DATE:       2023 12:12 PM  [address] [place] DATE:        2023 11:55 AM  RESPONDING  PROVIDER #:        Baljeet Collins MD          QUERY TEXT:    Patient admitted with COPD exacerbation with pneumonia. Noted documentation of   acute respiratory failure in  [facility] and progress notes. In order to support   the diagnosis of acute respiratory failure, please include additional clinical   indicators in your documentation. Or please document if the diagnosis of   acute respiratory failure has been ruled out after further study. The medical record reflects the following:  Risk Factors: COPD, PNA, smoking  Clinical Indicators: Wheezing, requiring oxygen to keep Sat above 92%. No   ABG's. Treatment: Oxygen, Rocephin, Duonebs, Solumedrol 40 mg b.i.d, labs. Thank you  Liz Balbuena RN, BSN, [facility]  [phone]    Acute Respiratory Failure Clinical Indicators per [employer] Training Guide and   Quick Reference Guide:  pO2 < 60 mmHg or SpO2 (pulse oximetry) < 91% breathing room air  pCO2 > 50 and pH < 7.35  P/F ratio (pO2 / FIO2) < 300  pO2 decrease or pCO2 increase by 10 mmHg from baseline (if known)  Supplemental oxygen of 40% or more  Presence of respiratory distress, tachypnea, dyspnea, shortness of breath,   wheezing  Unable to speak in complete sentences  Use of accessory muscles to breathe  Extreme anxiety and feeling of impending doom  Tripod position  Confusion/altered mental status/obtunded  Options provided:  -- Acute Respiratory Failure as evidenced by, Please document evidence.   -- Acute Respiratory Failure ruled out after study  -- Acute Respiratory Failure ruled out after study and Chronic Respiratory   Failure confirmed  -- Other - I will add my own diagnosis  -- Disagree - Not applicable / Not valid  -- Disagree - Clinically unable to determine / Unknown  -- Refer to Clinical Documentation Reviewer    PROVIDER RESPONSE TEXT:    This patient is in acute respiratory failure as evidenced by Required 4L O2 in   office prior to direct admission and had accessory muscle use with   respiratory distress on presentation    Query created by: Ashley Monk on 1/27/2023 1:25 PM      Electronically signed by:  Keerthi Owusu MD 1/31/2023 9:05 PM

## 2023-06-12 ENCOUNTER — TELEPHONE (OUTPATIENT)
Dept: VASCULAR SURGERY | Facility: CLINIC | Age: 63
End: 2023-06-12
Payer: MEDICARE

## 2023-06-13 ENCOUNTER — HOSPITAL ENCOUNTER (OUTPATIENT)
Dept: ULTRASOUND IMAGING | Facility: HOSPITAL | Age: 63
Discharge: HOME | End: 2023-06-13
Payer: MEDICARE

## 2023-06-13 ENCOUNTER — OFFICE VISIT (OUTPATIENT)
Dept: VASCULAR SURGERY | Facility: CLINIC | Age: 63
End: 2023-06-13
Payer: MEDICARE

## 2023-06-13 VITALS
OXYGEN SATURATION: 94 % | WEIGHT: 211 LBS | DIASTOLIC BLOOD PRESSURE: 82 MMHG | HEART RATE: 104 BPM | SYSTOLIC BLOOD PRESSURE: 116 MMHG | BODY MASS INDEX: 31.16 KG/M2

## 2023-06-13 DIAGNOSIS — I65.23 BILATERAL CAROTID ARTERY STENOSIS: ICD-10-CM

## 2023-06-13 DIAGNOSIS — I73.9 PERIPHERAL ARTERY DISEASE: ICD-10-CM

## 2023-06-13 DIAGNOSIS — I10 PRIMARY HYPERTENSION: ICD-10-CM

## 2023-06-13 DIAGNOSIS — E78.00 HYPERCHOLESTEROLEMIA: ICD-10-CM

## 2023-06-13 DIAGNOSIS — I10 ESSENTIAL HYPERTENSION: ICD-10-CM

## 2023-06-13 DIAGNOSIS — I73.9 PERIPHERAL ARTERY DISEASE: Primary | ICD-10-CM

## 2023-06-13 PROCEDURE — 93923 UPR/LXTR ART STDY 3+ LVLS: CPT

## 2023-06-13 PROCEDURE — 93880 EXTRACRANIAL BILAT STUDY: CPT

## 2023-06-13 RX ORDER — LINACLOTIDE 145 UG/1
CAPSULE, GELATIN COATED ORAL
COMMUNITY
Start: 2023-05-08

## 2023-06-13 RX ORDER — DEXLANSOPRAZOLE 60 MG/1
1 CAPSULE, DELAYED RELEASE ORAL DAILY
COMMUNITY
Start: 2023-05-08

## 2023-06-13 NOTE — PROGRESS NOTES
06/13/2023       Nikolai Treadwell MD  [address]  [place] [address]        Tabitha Gonzalez  1960    Chief Complaint   Patient presents with   • Follow-up     1 yr f/u with ABIs and carotids.  Last seen in the office on 6/30/22.  Pt denies any changes.       Dear Nikolai Treadwell MD:             I had the pleasure of seeing you patient in the office today for follow up.  As you recall, the patient is a 63 y.o. female who has a longstanding history of PAD.  She has previously undergone aortobifemoral bypass with lower extremity stenting by Dr. Clark.  Currently she is doing well and denies any strokelike symptoms.  She also denies any claudication to her lower extremities.  She is maintained on Xarelto, Pletal, and Lipitor.  She did have noninvasive testing performed today, which I did review in office.      Review of Systems   Constitutional: Negative.    HENT: Negative.    Eyes: Negative.    Respiratory: Negative.    Cardiovascular: Negative.    Gastrointestinal: Negative.    Endocrine: Negative.    Genitourinary: Negative.    Musculoskeletal: Positive for arthralgias.   Skin: Negative.    Allergic/Immunologic: Negative.    Neurological: Negative.    Hematological: Negative.    Psychiatric/Behavioral: Negative.    All other systems reviewed and are negative.       /82   Pulse 104   Wt 95.7 kg (211 lb)   SpO2 94%   BMI 31.16 kg/m²    Physical Exam  Vitals and nursing note reviewed.   Constitutional:       Appearance: Normal appearance. She is well-developed. She is obese.   HENT:      Head: Normocephalic and atraumatic.   Eyes:      General: No scleral icterus.     Pupils: Pupils are equal, round, and reactive to light.   Neck:      Thyroid: No thyromegaly.      Vascular: No carotid bruit or JVD.   Cardiovascular:      Rate and Rhythm: Normal rate and regular rhythm.      Pulses:           Carotid pulses are 2+ on the right side and 2+ on the left side.       Femoral pulses are 2+ on the  right side and 2+ on the left side.       Popliteal pulses are 2+ on the right side and 2+ on the left side.        Dorsalis pedis pulses are 2+ on the right side and 2+ on the left side.        Posterior tibial pulses are detected w/ Doppler on the right side and 2+ on the left side.      Heart sounds: Normal heart sounds.   Pulmonary:      Effort: Pulmonary effort is normal.      Breath sounds: Normal breath sounds.   Abdominal:      General: Bowel sounds are normal. There is no distension or abdominal bruit.      Palpations: Abdomen is soft. There is no mass.      Tenderness: There is no abdominal tenderness.   Musculoskeletal:         General: Normal range of motion.      Cervical back: Normal range of motion and neck supple.   Lymphadenopathy:      Cervical: No cervical adenopathy.   Skin:     General: Skin is warm and dry.   Neurological:      Mental Status: She is alert and oriented to person, place, and time.      Cranial Nerves: No cranial nerve deficit.      Sensory: No sensory deficit.   Psychiatric:         Mood and Affect: Mood normal.         Behavior: Behavior normal.         Thought Content: Thought content normal.         Judgment: Judgment normal.       Diagnostic Data:  US Carotid Bilateral    Result Date: 6/13/2023  Narrative: History: Carotid occlusive disease      Impression: Impression: 1. There is less than 50% stenosis of the right internal carotid artery. 2. There is less than 50% stenosis of the left internal carotid artery. 3. Antegrade flow is demonstrated in bilateral vertebral arteries.  Comments: Bilateral carotid vertebral arterial duplex scan was performed.  Grayscale imaging shows intimal thickening and calcified elements at the carotid bifurcation. The right internal carotid artery peak systolic velocity is 67.1 cm/sec. The end-diastolic velocity is 25 cm/sec. The right ICA/CCA ratio is approximately 1.3 . These findings correlate with less than 50% stenosis of the right internal  carotid artery.  Grayscale imaging shows intimal thickening and calcified elements at the carotid bifurcation. The left internal carotid artery peak systolic velocity is 59.5 cm/sec. The end-diastolic velocity is 18.1 cm/sec. The left ICA/CCA ratio is approximately 0.9 . These findings correlate with less than 50% stenosis of the left internal carotid artery.  Antegrade flow is demonstrated in bilateral vertebral arteries.  This report was finalized on 06/13/2023 15:20 by Dr. Tony Young MD.    US Ankle / Brachial Indices Extremity Complete    Result Date: 6/13/2023  Narrative:  History: PAD  Comments: Bilateral lower extremity arterial with multi-level pulse volume recordings and segmental pressures were performed at rest and stress.  The right ankle/brachial index is 0.96. The waveforms are triphasic without dampening.These findings are consistent with no significant arterial insufficiency of the right lower extremity at rest.  The left ankle/brachial index is 1.02. The waveforms are triphasic without dampening. These findings are consistent with no significant arterial insufficiency of the left lower extremity at rest.      Impression: Impression: 1. No significant arterial insufficiency of the right lower extremity at rest. 2. No significant arterial insufficiency of the left lower extremity at rest.   This report was finalized on 06/13/2023 15:15 by Dr. Tony Young MD.         Patient Active Problem List   Diagnosis   • COPD (chronic obstructive pulmonary disease)   • History of adenomatous polyp of colon   • Chronic constipation   • Family history of colon cancer   • Epigastric pain   • Peripheral artery disease   • Hypertension   • Hypercholesterolemia   • DVT (deep venous thrombosis)   • BMI 30.0-30.9,adult         ICD-10-CM ICD-9-CM   1. Peripheral artery disease  I73.9 443.9   2. Bilateral carotid artery stenosis  I65.23 433.10     433.30   3. Essential hypertension  I10 401.9   4.  Hypercholesterolemia  E78.00 272.0   5. Primary hypertension  I10 401.9       PLAN: After thoroughly evaluating Tabitha Gonzalez, I believe the best course of action is to remain conservative from vascular surgery standpoint.  Currently she is doing well and denies any claudication to her lower extremities.  She also denies any strokelike symptoms.  I did review her testing which shows no arterial insufficiency to her lower extremities.  Her carotid duplex shows less than 50% carotid stenosis bilaterally.  We will see her back in 1 year with repeat noninvasive testing for continued surveillance, including ABIs and a carotid duplex.   She can continue with compression stockings to aid with her swelling.  I did discuss vascular risk factors as they pertain to the progression of vascular disease including controlling her hypertension and cholesterol.  She should continue on her Xarelto 20 mg daily, Pletal 100 mg twice daily, and Lipitor 20 mg daily in addition to her other medications. This was all discussed in full with complete understanding.  Thank you for allowing me to participate in the care of your patient.  Please do not hesitate to call with any questions or concerns.  We will keep you aware of any further encounters with Tabitha Gonzalez.      Sincerely Yours,        SHYLA Pal

## 2023-06-13 NOTE — LETTER
June 14, 2023     Nikolai Treadwell MD  [address]  [place] [address]    Patient: Tabitha Gonzalez   YOB: 1960   Date of Visit: 6/13/2023       Dear Dr. Eben MD:    Thank you for referring Tabitha Gonzalez to me for evaluation. Below are the relevant portions of my assessment and plan of care.    If you have questions, please do not hesitate to call me. I look forward to following Tabitha along with you.         Sincerely,        Tony Young,         CC: No Recipients    Tony Young,   06/13/23 1343  Signed  06/13/2023       Nikolai Treadwell MD  [address]  [place] [address]        Tabitha Gonzalez  1960    Chief Complaint   Patient presents with   • Follow-up     1 yr f/u with ABIs and carotids.  Last seen in the office on 6/30/22.  Pt denies any changes.       Dear Nikolai Treadwell MD:             I had the pleasure of seeing you patient in the office today for follow up.  As you recall, the patient is a 63 y.o. female who has a longstanding history of PAD.  She has previously undergone aortobifemoral bypass with lower extremity stenting by Dr. Clark.  Currently she is doing well and denies any strokelike symptoms.  She also denies any claudication to her lower extremities.  She is maintained on Xarelto, Pletal, and Lipitor.  She did have noninvasive testing performed today, which I did review in office.      Review of Systems   Constitutional: Negative.    HENT: Negative.    Eyes: Negative.    Respiratory: Negative.    Cardiovascular: Negative.    Gastrointestinal: Negative.    Endocrine: Negative.    Genitourinary: Negative.    Musculoskeletal: Positive for arthralgias.   Skin: Negative.    Allergic/Immunologic: Negative.    Neurological: Negative.    Hematological: Negative.    Psychiatric/Behavioral: Negative.    All other systems reviewed and are negative.       /82   Pulse 104   Wt 95.7 kg (211 lb)   SpO2 94%   BMI 31.16 kg/m²    Physical  Exam  Vitals and nursing note reviewed.   Constitutional:       Appearance: Normal appearance. She is well-developed. She is obese.   HENT:      Head: Normocephalic and atraumatic.   Eyes:      General: No scleral icterus.     Pupils: Pupils are equal, round, and reactive to light.   Neck:      Thyroid: No thyromegaly.      Vascular: No carotid bruit or JVD.   Cardiovascular:      Rate and Rhythm: Normal rate and regular rhythm.      Pulses:           Carotid pulses are 2+ on the right side and 2+ on the left side.       Femoral pulses are 2+ on the right side and 2+ on the left side.       Popliteal pulses are 2+ on the right side and 2+ on the left side.        Dorsalis pedis pulses are 2+ on the right side and 2+ on the left side.        Posterior tibial pulses are detected w/ Doppler on the right side and 2+ on the left side.      Heart sounds: Normal heart sounds.   Pulmonary:      Effort: Pulmonary effort is normal.      Breath sounds: Normal breath sounds.   Abdominal:      General: Bowel sounds are normal. There is no distension or abdominal bruit.      Palpations: Abdomen is soft. There is no mass.      Tenderness: There is no abdominal tenderness.   Musculoskeletal:         General: Normal range of motion.      Cervical back: Normal range of motion and neck supple.   Lymphadenopathy:      Cervical: No cervical adenopathy.   Skin:     General: Skin is warm and dry.   Neurological:      Mental Status: She is alert and oriented to person, place, and time.      Cranial Nerves: No cranial nerve deficit.      Sensory: No sensory deficit.   Psychiatric:         Mood and Affect: Mood normal.         Behavior: Behavior normal.         Thought Content: Thought content normal.         Judgment: Judgment normal.       Diagnostic Data:  US Carotid Bilateral    Result Date: 6/13/2023  Narrative: History: Carotid occlusive disease      Impression: Impression: 1. There is less than 50% stenosis of the right internal  carotid artery. 2. There is less than 50% stenosis of the left internal carotid artery. 3. Antegrade flow is demonstrated in bilateral vertebral arteries.  Comments: Bilateral carotid vertebral arterial duplex scan was performed.  Grayscale imaging shows intimal thickening and calcified elements at the carotid bifurcation. The right internal carotid artery peak systolic velocity is 67.1 cm/sec. The end-diastolic velocity is 25 cm/sec. The right ICA/CCA ratio is approximately 1.3 . These findings correlate with less than 50% stenosis of the right internal carotid artery.  Grayscale imaging shows intimal thickening and calcified elements at the carotid bifurcation. The left internal carotid artery peak systolic velocity is 59.5 cm/sec. The end-diastolic velocity is 18.1 cm/sec. The left ICA/CCA ratio is approximately 0.9 . These findings correlate with less than 50% stenosis of the left internal carotid artery.  Antegrade flow is demonstrated in bilateral vertebral arteries.  This report was finalized on 06/13/2023 15:20 by Dr. Tony Young MD.    US Ankle / Brachial Indices Extremity Complete    Result Date: 6/13/2023  Narrative:  History: PAD  Comments: Bilateral lower extremity arterial with multi-level pulse volume recordings and segmental pressures were performed at rest and stress.  The right ankle/brachial index is 0.96. The waveforms are triphasic without dampening.These findings are consistent with no significant arterial insufficiency of the right lower extremity at rest.  The left ankle/brachial index is 1.02. The waveforms are triphasic without dampening. These findings are consistent with no significant arterial insufficiency of the left lower extremity at rest.      Impression: Impression: 1. No significant arterial insufficiency of the right lower extremity at rest. 2. No significant arterial insufficiency of the left lower extremity at rest.   This report was finalized on 06/13/2023 15:15 by   Tony Young MD.         Patient Active Problem List   Diagnosis   • COPD (chronic obstructive pulmonary disease)   • History of adenomatous polyp of colon   • Chronic constipation   • Family history of colon cancer   • Epigastric pain   • Peripheral artery disease   • Hypertension   • Hypercholesterolemia   • DVT (deep venous thrombosis)   • BMI 30.0-30.9,adult         ICD-10-CM ICD-9-CM   1. Peripheral artery disease  I73.9 443.9   2. Bilateral carotid artery stenosis  I65.23 433.10     433.30   3. Essential hypertension  I10 401.9   4. Hypercholesterolemia  E78.00 272.0   5. Primary hypertension  I10 401.9       PLAN: After thoroughly evaluating Tabitha Gonzalez, I believe the best course of action is to remain conservative from vascular surgery standpoint.  Currently she is doing well and denies any claudication to her lower extremities.  She also denies any strokelike symptoms.  I did review her testing which shows no arterial insufficiency to her lower extremities.  Her carotid duplex shows less than 50% carotid stenosis bilaterally.  We will see her back in 1 year with repeat noninvasive testing for continued surveillance, including ABIs and a carotid duplex.   She can continue with compression stockings to aid with her swelling.  I did discuss vascular risk factors as they pertain to the progression of vascular disease including controlling her hypertension and cholesterol.  She should continue on her Xarelto 20 mg daily, Pletal 100 mg twice daily, and Lipitor 20 mg daily in addition to her other medications. This was all discussed in full with complete understanding.  Thank you for allowing me to participate in the care of your patient.  Please do not hesitate to call with any questions or concerns.  We will keep you aware of any further encounters with Tabitha Gonzalez.      Sincerely Yours,        SHYLA Pal

## 2023-10-16 ENCOUNTER — TRANSCRIBE ORDERS (OUTPATIENT)
Dept: ADMINISTRATIVE | Facility: HOSPITAL | Age: 63
End: 2023-10-16
Payer: MEDICARE

## 2023-10-16 DIAGNOSIS — R01.1 HEART MURMUR, SYSTOLIC: ICD-10-CM

## 2023-10-16 DIAGNOSIS — R42 DIZZINESS: Primary | ICD-10-CM

## 2023-10-31 ENCOUNTER — HOSPITAL ENCOUNTER (OUTPATIENT)
Dept: CARDIOLOGY | Facility: HOSPITAL | Age: 63
Discharge: HOME | End: 2023-10-31
Admitting: PHYSICIAN ASSISTANT
Payer: MEDICARE

## 2023-10-31 VITALS
HEIGHT: 69 IN | DIASTOLIC BLOOD PRESSURE: 82 MMHG | WEIGHT: 211 LBS | SYSTOLIC BLOOD PRESSURE: 116 MMHG | BODY MASS INDEX: 31.25 KG/M2

## 2023-10-31 DIAGNOSIS — R42 DIZZINESS: ICD-10-CM

## 2023-10-31 DIAGNOSIS — R01.1 HEART MURMUR, SYSTOLIC: ICD-10-CM

## 2023-10-31 PROCEDURE — 93325 DOPPLER ECHO COLOR FLOW MAPG: CPT

## 2023-10-31 PROCEDURE — 93321 DOPPLER ECHO F-UP/LMTD STD: CPT

## 2023-10-31 PROCEDURE — 25510000001 PERFLUTREN 6.52 MG/ML SUSPENSION: Performed by: INTERNAL MEDICINE

## 2023-10-31 PROCEDURE — 93308 TTE F-UP OR LMTD: CPT

## 2023-10-31 RX ADMIN — PERFLUTREN 6.52 MG: 6.52 INJECTION, SUSPENSION INTRAVENOUS at 08:34

## 2023-11-01 LAB
BH CV ECHO MEAS - AO MAX PG: 22.7 MMHG
BH CV ECHO MEAS - AO MEAN PG: 10.6 MMHG
BH CV ECHO MEAS - AO ROOT DIAM: 2.7 CM
BH CV ECHO MEAS - AO V2 MAX: 238 CM/SEC
BH CV ECHO MEAS - AO V2 VTI: 38.2 CM
BH CV ECHO MEAS - AVA(I,D): 1.49 CM2
BH CV ECHO MEAS - EDV(CUBED): 60.2 ML
BH CV ECHO MEAS - EDV(MOD-SP2): 64.5 ML
BH CV ECHO MEAS - EDV(MOD-SP4): 66.1 ML
BH CV ECHO MEAS - EF(MOD-SP2): 73.2 %
BH CV ECHO MEAS - EF(MOD-SP4): 72.5 %
BH CV ECHO MEAS - ESV(CUBED): 9.7 ML
BH CV ECHO MEAS - ESV(MOD-SP2): 17.3 ML
BH CV ECHO MEAS - ESV(MOD-SP4): 18.2 ML
BH CV ECHO MEAS - FS: 45.7 %
BH CV ECHO MEAS - IVS/LVPW: 0.87 CM
BH CV ECHO MEAS - IVSD: 0.99 CM
BH CV ECHO MEAS - LA DIMENSION: 2.8 CM
BH CV ECHO MEAS - LAT PEAK E' VEL: 5.4 CM/SEC
BH CV ECHO MEAS - LV MASS(C)D: 135.1 GRAMS
BH CV ECHO MEAS - LV MAX PG: 2.7 MMHG
BH CV ECHO MEAS - LV MEAN PG: 1 MMHG
BH CV ECHO MEAS - LV V1 MAX: 81.6 CM/SEC
BH CV ECHO MEAS - LV V1 VTI: 16.4 CM
BH CV ECHO MEAS - LVIDD: 3.9 CM
BH CV ECHO MEAS - LVIDS: 2.13 CM
BH CV ECHO MEAS - LVOT AREA: 3.5 CM2
BH CV ECHO MEAS - LVOT DIAM: 2.1 CM
BH CV ECHO MEAS - LVPWD: 1.14 CM
BH CV ECHO MEAS - MED PEAK E' VEL: 8.6 CM/SEC
BH CV ECHO MEAS - MV A MAX VEL: 105 CM/SEC
BH CV ECHO MEAS - MV DEC TIME: 0.17 SEC
BH CV ECHO MEAS - MV E MAX VEL: 62.9 CM/SEC
BH CV ECHO MEAS - MV E/A: 0.6
BH CV ECHO MEAS - PA V2 MAX: 110.3 CM/SEC
BH CV ECHO MEAS - RAP SYSTOLE: 8 MMHG
BH CV ECHO MEAS - RVSP: 21.8 MMHG
BH CV ECHO MEAS - SV(LVOT): 56.8 ML
BH CV ECHO MEAS - SV(MOD-SP2): 47.2 ML
BH CV ECHO MEAS - SV(MOD-SP4): 47.9 ML
BH CV ECHO MEAS - TR MAX PG: 13.8 MMHG
BH CV ECHO MEAS - TR MAX VEL: 186 CM/SEC
BH CV ECHO MEASUREMENTS AVERAGE E/E' RATIO: 8.99
BH CV XLRA - TDI S': 17.9 CM/SEC
LEFT ATRIUM VOLUME INDEX: 15.2 ML/M2

## 2023-11-11 ENCOUNTER — HOSPITAL ENCOUNTER (OUTPATIENT)
Dept: CT IMAGING | Facility: HOSPITAL | Age: 63
Discharge: HOME | End: 2023-11-11
Admitting: PHYSICIAN ASSISTANT
Payer: MEDICARE

## 2023-11-11 DIAGNOSIS — Z72.0 TOBACCO ABUSE: ICD-10-CM

## 2023-11-11 PROCEDURE — 71271 CT THORAX LUNG CANCER SCR C-: CPT

## 2023-12-06 ENCOUNTER — TRANSCRIBE ORDERS (OUTPATIENT)
Dept: ADMINISTRATIVE | Facility: HOSPITAL | Age: 63
End: 2023-12-06
Payer: MEDICARE

## 2023-12-06 DIAGNOSIS — I25.10 ATHEROSCLEROSIS OF NATIVE CORONARY ARTERY OF NATIVE HEART WITHOUT ANGINA PECTORIS: Primary | ICD-10-CM

## 2023-12-12 ENCOUNTER — OFFICE VISIT (OUTPATIENT)
Dept: PULMONOLOGY | Facility: CLINIC | Age: 63
End: 2023-12-12
Payer: MEDICARE

## 2023-12-12 VITALS
WEIGHT: 212 LBS | DIASTOLIC BLOOD PRESSURE: 82 MMHG | OXYGEN SATURATION: 97 % | HEART RATE: 82 BPM | SYSTOLIC BLOOD PRESSURE: 128 MMHG | BODY MASS INDEX: 31.4 KG/M2 | HEIGHT: 69 IN

## 2023-12-12 DIAGNOSIS — R06.02 SHORTNESS OF BREATH: ICD-10-CM

## 2023-12-12 DIAGNOSIS — J43.2 CENTRILOBULAR EMPHYSEMA: ICD-10-CM

## 2023-12-12 DIAGNOSIS — R91.8 LUNG NODULES: Primary | ICD-10-CM

## 2023-12-12 PROCEDURE — 99204 OFFICE O/P NEW MOD 45 MIN: CPT | Performed by: INTERNAL MEDICINE

## 2023-12-12 PROCEDURE — 3074F SYST BP LT 130 MM HG: CPT | Performed by: INTERNAL MEDICINE

## 2023-12-12 PROCEDURE — 3079F DIAST BP 80-89 MM HG: CPT | Performed by: INTERNAL MEDICINE

## 2023-12-12 NOTE — PROGRESS NOTES
Background:  Pt w hx obst lung disease, nodules   Chief Complaint  pulmonary nodules    Subjective    History of Present Illness    Tabitha Gonzalez presents to [facility].  History of Present Illness  Dr. Santos is asked me to see her.  I have discussed her case with Dr. Natalia Santos personally today also.  She reports COVID about 4 months ago.  She reports smoking.  She reports vaping.  She reports wanting to stop smoking.  She reports a strong family history of lung cancer with multiple family members on her father's side developing lung disease.  She smoked from age 89 until her late 40s.  She has been vaping since then.  She has had difficulty stopping that.  She has had dyspnea on exertion, especially after having had COVID.  She had a screening CT scan showing some very small nodules in 1 somewhat larger nodule that was 7 mm across.  I have asked to see her for this and for her respiratory problems.  She does not have any inhalers currently.       has a past medical history of AVM (arteriovenous malformation) of colon, Colon polyp, COPD (chronic obstructive pulmonary disease), DVT (deep venous thrombosis), Family history of colon cancer, Family history of colonic polyps, GERD (gastroesophageal reflux disease), History of adenomatous polyp of colon, History of transfusion, Hypercholesterolemia, Hypertension, IBS (irritable bowel syndrome), Migraine, Peripheral artery disease, PONV (postoperative nausea and vomiting), and PVD (peripheral vascular disease).   has a past surgical history that includes Cholecystectomy; Hysterectomy; Appendectomy; Aorta Femoral Bypass; Peripheral arterial stent graft; Colonoscopy (10/28/2011); Esophagogastroduodenoscopy (01/26/2007); Colonoscopy (N/A, 6/19/2017); Esophagogastroduodenoscopy (N/A, 6/19/2017); and Colonoscopy (N/A, 12/3/2020).  family history includes Cancer in her father; Colon cancer in her sister; Colon polyps  "in her sister; Coronary artery disease in her mother; Rectal cancer in her sister; Stomach cancer in her brother.   reports that she quit smoking about 9 years ago. Her smoking use included cigarettes. She has never used smokeless tobacco. She reports current alcohol use. She reports that she does not use drugs.  Allergies   Allergen Reactions    Contrast Dye (Echo Or Unknown Ct/Mr) Shortness Of Breath and Swelling    Azithromycin Rash    Codeine Hives and Itching    Penicillin G Sodium Rash    Sulfa Antibiotics Hives and Itching     Current Outpatient Medications   Medication Instructions    atorvastatin (LIPITOR) 20 MG tablet 1 tablet, Oral, Daily    cilostazol (PLETAL) 100 mg, Oral, 2 Times Daily    dexlansoprazole (DEXILANT) 60 MG capsule 1 capsule, Oral, Daily    Fluticasone-Umeclidin-Vilant (TRELEGY) 100-62.5-25 MCG/ACT inhaler 1 puff, Inhalation, Daily    Folbic 2.5-25-2 MG tablet tablet 1 tablet, Oral, Daily    Linzess 145 MCG capsule capsule TAKE 1 CAPSULE BY MOUTH ONCE DAILY ON AN EMPTY STOMACH 30 MINUTES BEFORE FIRST MEAL OF THE DAY    metoprolol succinate XL (TOPROL-XL) 50 mg, Oral, Daily    rivaroxaban (XARELTO) 20 mg, Oral, Daily      Objective     Vital Signs:   /82   Pulse 82   Ht 175.3 cm (69\")   Wt 96.2 kg (212 lb)   SpO2 97% Comment: RA  BMI 31.31 kg/m²   Physical Exam  Constitutional:       General: She is not in acute distress.     Appearance: She is well-developed. She is not ill-appearing or toxic-appearing.   HENT:      Head: Atraumatic.   Eyes:      General: No scleral icterus.     Conjunctiva/sclera: Conjunctivae normal.   Cardiovascular:      Rate and Rhythm: Normal rate and regular rhythm.      Heart sounds: S1 normal and S2 normal.   Pulmonary:      Effort: Pulmonary effort is normal.      Breath sounds: Wheezing (on forced exhalation) present.   Abdominal:      General: There is no distension.   Musculoskeletal:         General: No deformity.      Cervical back: Neck supple. "   Skin:     Coloration: Skin is not pale.      Findings: No rash.   Neurological:      Mental Status: She is alert.        Result Review  Data Reviewed:    CT Chest Low Dose Cancer Screening WO (11/11/2023 07:00)     1. There is a 7 mm left upper lobe subsolid nodule with 2 mm solid  component. This is a lung RADS category 3 nodule, probably benign.  6-month follow-up low-dose CT is recommended.  2. There are too numerous to count additional bilateral tiny  centrilobular nodules, essentially diffuse throughout both lungs and  favoring the lung periphery. This is likely a respiratory bronchiolitis  (such as respiratory bronchiolitis-interstitial lung disease) or less  likely hypersensitivity pneumonitis.  3. Centrilobular emphysema.  4. Prominent coronary atheromatous calcification and as well as  prominent calcified plaque at the origin of the left subclavian artery.  5. 3.5 cm left adrenal mass which is stable dating back to January 2014,  favor adenoma.    Personal review of imaging : CT scan shows small anterior ANGELICA subpleural nodule.               Assessment and Plan   Diagnoses and all orders for this visit:    1. Lung nodules (Primary)  -     CT Chest Without Contrast Diagnostic; Future    2. Centrilobular emphysema  -     Complete PFT - Pre & Post Bronchodilator; Future  -     Fluticasone-Umeclidin-Vilant (TRELEGY) 100-62.5-25 MCG/ACT inhaler; Inhale 1 puff Daily for 30 days.  Dispense: 14 each; Refill: 0    3. Shortness of breath    Is a patient with history of smoking and family history is certainly at risk for lung cancer.  Screening CT is indeterminate.  This nodule is small, too small to characterize further by PET scanning or to approach by bronchoscopy.  She does also have emphysema on her CT scan.  I think her shortness of breath may be related to COPD.  We will get pulmonary function test on her.  We did  her regarding smoking.  We did 5 minutes of smoking cessation counseling today.  She is  using vape type devices.  We discussed dangers of smoking dangers of vape use.  She has been steered a little bit away from nicotine replacement gum, however she has continued with these vapes and says that she has had a hard time stopping.  I recommended she switch over to briefly use nicotine patches and perhaps start the 14 mg dose and taper off quickly.  I would recommend that she do this only after she is fully committed to stopping smoking and then perhaps she can stop with coming off of patches without going through nicotine withdrawal and not having so much nicotine exposure to jeopardize her vasculature with her history of vascular disease.  COVID may have been contributing both to her symptoms of shortness of breath and this may be long COVID.  COVID also may have led to some lung infiltrates and it is conceivable that the lesion in the peripheral left lung may be related to COVID as well.  It could also be granuloma due to histoplasma or certainly could also be malignancy.  We reviewed all of that.  She will work on stopping smoking.  Will give her a trial of Trelegy inhaler.  We gave her a sample of this and demonstrated proper technique for using it.  Will schedule pulmonary function test including lung volumes with body box.  She will let us know how the inhaler does.  We will plan a follow-up CT of the chest because that is really the only way we can assess this further.  If this lesion increases in size a little bit we could then do a PET scan and would consider resection if that is the case.  She has already had the RSV vaccine and that is great.    Follow Up   Return in about 2 months (around 2/12/2024) for review CT.  Patient was given instructions and counseling regarding her condition or for health maintenance advice. Please see specific information pulled into the AVS if appropriate.    Electronically signed by Vamshi Harris MD, 12/12/2023, 15:11 CST

## 2023-12-19 ENCOUNTER — PATIENT ROUNDING (BHMG ONLY) (OUTPATIENT)
Dept: PULMONOLOGY | Facility: CLINIC | Age: 63
End: 2023-12-19
Payer: MEDICARE

## 2023-12-19 ENCOUNTER — HOSPITAL ENCOUNTER (OUTPATIENT)
Dept: CARDIOLOGY | Facility: HOSPITAL | Age: 63
Discharge: HOME | End: 2023-12-19
Payer: MEDICARE

## 2023-12-19 NOTE — PROGRESS NOTES
December 19, 2023    Hello, may I speak with Tabitha Gonzalez?    My name is Ayesha Murcia    I am  with [facility]  [address]  [facility] [address] [phone].    Before we get started may I verify your date of birth? 1960    I am calling to officially welcome you to our practice and ask about your recent visit. Is this a good time to talk? LVM    Tell me about your visit with us. What things went well?         We're always looking for ways to make our patients' experiences even better. Do you have recommendations on ways we may improve?      Overall were you satisfied with your first visit to our practice?        I appreciate you taking the time to speak with me today. Is there anything else I can do for you?       Thank you, and have a great day.

## 2024-01-09 ENCOUNTER — HOSPITAL ENCOUNTER (OUTPATIENT)
Dept: PULMONOLOGY | Facility: HOSPITAL | Age: 64
Discharge: HOME | End: 2024-01-09
Admitting: INTERNAL MEDICINE
Payer: MEDICARE

## 2024-01-09 DIAGNOSIS — J43.2 CENTRILOBULAR EMPHYSEMA: ICD-10-CM

## 2024-01-09 PROCEDURE — 94726 PLETHYSMOGRAPHY LUNG VOLUMES: CPT

## 2024-01-09 PROCEDURE — 94060 EVALUATION OF WHEEZING: CPT

## 2024-01-09 PROCEDURE — 94729 DIFFUSING CAPACITY: CPT

## 2024-01-09 RX ORDER — ALBUTEROL SULFATE 2.5 MG/3ML
2.5 SOLUTION RESPIRATORY (INHALATION) ONCE
Status: COMPLETED | OUTPATIENT
Start: 2024-01-09 | End: 2024-01-09

## 2024-01-09 RX ADMIN — ALBUTEROL SULFATE 2.5 MG: 2.5 SOLUTION RESPIRATORY (INHALATION) at 13:14

## 2024-02-13 ENCOUNTER — OFFICE VISIT (OUTPATIENT)
Dept: PULMONOLOGY | Facility: CLINIC | Age: 64
End: 2024-02-13
Payer: MEDICARE

## 2024-02-13 VITALS
HEART RATE: 86 BPM | BODY MASS INDEX: 32.14 KG/M2 | OXYGEN SATURATION: 95 % | HEIGHT: 69 IN | SYSTOLIC BLOOD PRESSURE: 126 MMHG | WEIGHT: 217 LBS | DIASTOLIC BLOOD PRESSURE: 74 MMHG

## 2024-02-13 DIAGNOSIS — R91.8 LUNG NODULES: Primary | ICD-10-CM

## 2024-02-13 DIAGNOSIS — J44.9 COPD, MODERATE: ICD-10-CM

## 2024-02-13 PROCEDURE — 3074F SYST BP LT 130 MM HG: CPT | Performed by: INTERNAL MEDICINE

## 2024-02-13 PROCEDURE — 3078F DIAST BP <80 MM HG: CPT | Performed by: INTERNAL MEDICINE

## 2024-02-13 PROCEDURE — 99214 OFFICE O/P EST MOD 30 MIN: CPT | Performed by: INTERNAL MEDICINE

## 2024-02-20 ENCOUNTER — OFFICE VISIT (OUTPATIENT)
Dept: GASTROENTEROLOGY | Facility: CLINIC | Age: 64
End: 2024-02-20
Payer: MEDICARE

## 2024-02-20 VITALS
OXYGEN SATURATION: 98 % | BODY MASS INDEX: 32.44 KG/M2 | HEIGHT: 69 IN | WEIGHT: 219 LBS | HEART RATE: 107 BPM | SYSTOLIC BLOOD PRESSURE: 162 MMHG | DIASTOLIC BLOOD PRESSURE: 82 MMHG | TEMPERATURE: 97.7 F

## 2024-02-20 DIAGNOSIS — Z86.010 HISTORY OF ADENOMATOUS POLYP OF COLON: Primary | ICD-10-CM

## 2024-02-20 DIAGNOSIS — D68.9 COAGULOPATHY: ICD-10-CM

## 2024-02-20 DIAGNOSIS — Z80.0 FAMILY HISTORY OF RECTAL CANCER: ICD-10-CM

## 2024-02-20 NOTE — PROGRESS NOTES
[facility]    Primary Physician Nikolai Treadwell MD    2/20/2024    Tbaitha Gonzalez   1960      Chief Complaint   Patient presents with    Colonoscopy       Subjective     HPI    Tabitha Gonzalez is a 64 y.o. female who presents as a referral for preventative maintenance. She has no complaints of nausea or vomiting. No change in bowels. No wt loss. No BRBPR. No melena. No abdominal pain.       She is on pletal and xarelto. Managed by Dr. Treadwell. Has had vascular surgery several years ago by Dr. Clark.       COLONOSCOPY (12/03/2020 10:32) 4 colon polyps.  Recall 3 years.   Tissue Pathology Exam (12/03/2020 10:50)     Sister had rectal cancer.         UPPER GI ENDOSCOPY (06/19/2017 09:12) normal.         Past Medical History:   Diagnosis Date    AVM (arteriovenous malformation) of colon     Colon polyp     COPD (chronic obstructive pulmonary disease)     DVT (deep venous thrombosis)     Family history of colon cancer     Family history of colonic polyps     GERD (gastroesophageal reflux disease)     History of adenomatous polyp of colon     History of transfusion     after vascular surgery    Hypercholesterolemia     Hypertension     IBS (irritable bowel syndrome)     Migraine     Peripheral artery disease     PONV (postoperative nausea and vomiting)     PVD (peripheral vascular disease)        Past Surgical History:   Procedure Laterality Date    AORTA FEMORAL BYPASS      APPENDECTOMY      CHOLECYSTECTOMY      COLONOSCOPY  10/28/2011    COLONOSCOPY N/A 6/19/2017    Procedure: COLONOSCOPY WITH ANESTHESIA;  Surgeon: Zack Do MD;  Location: [facility] ENDOSCOPY;  Service:     COLONOSCOPY N/A 12/3/2020    Procedure: COLONOSCOPY WITH ANESTHESIA;  Surgeon: Zack Do MD;  Location: [facility] ENDOSCOPY;  Service: Gastroenterology;  Laterality: N/A;  pre hx adenomatous colon polyp, family hx colon ca  post polyps  Nikolai Treadwell MD    ENDOSCOPY  01/26/2007    ENDOSCOPY N/A  6/19/2017    Procedure: ESOPHAGOGASTRODUODENOSCOPY WITH ANESTHESIA;  Surgeon: Zack Do MD;  Location: [facility] ENDOSCOPY;  Service:     HYSTERECTOMY      PERIPHERAL ARTERIAL STENT GRAFT         Outpatient Medications Marked as Taking for the 2/20/24 encounter (Office Visit) with Eliana Shah APRN   Medication Sig Dispense Refill    atorvastatin (LIPITOR) 20 MG tablet Take 1 tablet by mouth Daily.      cilostazol (PLETAL) 100 MG tablet Take 1 tablet by mouth 2 (Two) Times a Day.      dexlansoprazole (DEXILANT) 60 MG capsule Take 1 capsule by mouth Daily.      Folbic 2.5-25-2 MG tablet tablet Take 1 tablet by mouth Daily.      Linzess 145 MCG capsule capsule       metoprolol succinate XL (TOPROL-XL) 50 MG 24 hr tablet Take 1 tablet by mouth Daily.      rivaroxaban (XARELTO) 20 MG tablet Take 1 tablet by mouth Daily.         Allergies   Allergen Reactions    Contrast Dye (Echo Or Unknown Ct/Mr) Shortness Of Breath and Swelling    Azithromycin Rash    Codeine Hives and Itching    Penicillin G Sodium Rash    Sulfa Antibiotics Hives and Itching       Social History     Socioeconomic History    Marital status:    Tobacco Use    Smoking status: Former     Types: Cigarettes     Quit date: 2014     Years since quitting: 10.1    Smokeless tobacco: Never   Vaping Use    Vaping Use: Every day   Substance and Sexual Activity    Alcohol use: Yes     Comment: Rarely     Drug use: No    Sexual activity: Defer       Family History   Problem Relation Age of Onset    Coronary artery disease Mother     Cancer Father         lung    Rectal cancer Sister         In her 50's    Colon cancer Sister         rectal cancer in her 50 's    Stomach cancer Brother     Esophageal cancer Neg Hx     Liver cancer Neg Hx     Liver disease Neg Hx        Review of Systems   Constitutional:  Negative for chills, fever and unexpected weight change.   Respiratory:  Negative for shortness of breath.    Cardiovascular:  Negative for chest  pain.   Gastrointestinal:  Negative for abdominal distention, abdominal pain, anal bleeding, blood in stool, constipation, diarrhea, nausea and vomiting.       Objective     Vitals:    02/20/24 0838   BP: 162/82   Pulse: 107   Temp: 97.7 °F (36.5 °C)   SpO2: 98%         02/20/24  0838   Weight: 99.3 kg (219 lb)     Body mass index is 32.81 kg/m².    Physical Exam  Vitals reviewed.   Constitutional:       General: She is not in acute distress.  Cardiovascular:      Rate and Rhythm: Normal rate and regular rhythm.      Heart sounds: Normal heart sounds.   Pulmonary:      Effort: Pulmonary effort is normal.      Breath sounds: Normal breath sounds.   Abdominal:      General: Bowel sounds are normal. There is no distension.      Palpations: Abdomen is soft.      Tenderness: There is no abdominal tenderness.   Skin:     General: Skin is warm and dry.   Neurological:      Mental Status: She is alert.         Imaging Results (Most Recent)       None            Assessment & Plan     Diagnoses and all orders for this visit:    1. History of adenomatous polyp of colon (Primary)    2. Coagulopathy    3. Family history of rectal cancer      Plan for colonoscopy. However prior to scheduling I will send a letter to Dr. Treadwell in regards to if the patient can safely hold xarelto  24 hours prior to procedure. I will contact patient once he has responded.  I have instructed the patient to contact our office if she has not heard from us within 2 weeks.  She will also need to hold pletal 3 days prior to procedure.       The patient was advised on the risks of stopping blood thinners for a procedure.  The risks discussed included the risk of developing myocardial infarction, CVA, embolus, clogging of the arteries or stents, etc.  We discussed the potential consequences of the risks discussed.  The benefits of stopping as well as the alternatives were discussed as well.   Patient is to hold their anticoagulation medication per the  direction of their prescribing physician, Dr Treadwell.    A letter will be sent to prescribing This is to prevent any risk or complication from bleeding intra and post procedure. If they develop bleeding post procedure they are to go the emergency department for further evaluation and treatment immediately.                    * Surgery not found *  All risks, benefits, alternatives, and indications of colonoscopy procedure have been discussed with the patient. Risks to include perforation of the colon requiring possible surgery or colostomy, risk of bleeding from biopsies or removal of colon tissue, possibility of missing a colon polyp or cancer, or adverse drug reaction.  Benefits to include the diagnosis and management of disease of the colon and rectum. Alternatives to include barium enema, radiographic evaluation, lab testing or no intervention. Pt verbalizes understanding and agrees.     There are no Patient Instructions on file for this visit.    SHYLA Hines

## 2024-03-07 ENCOUNTER — TELEPHONE (OUTPATIENT)
Dept: GASTROENTEROLOGY | Facility: CLINIC | Age: 64
End: 2024-03-07
Payer: MEDICARE

## 2024-03-07 ENCOUNTER — PREP FOR SURGERY (OUTPATIENT)
Dept: OTHER | Facility: HOSPITAL | Age: 64
End: 2024-03-07
Payer: MEDICARE

## 2024-03-07 DIAGNOSIS — Z80.0 FAMILY HISTORY OF RECTAL CANCER: ICD-10-CM

## 2024-03-07 DIAGNOSIS — Z86.010 HISTORY OF ADENOMATOUS POLYP OF COLON: Primary | ICD-10-CM

## 2024-03-07 RX ORDER — POLYETHYLENE GLYCOL 3350, SODIUM SULFATE ANHYDROUS, SODIUM BICARBONATE, SODIUM CHLORIDE, POTASSIUM CHLORIDE 236; 22.74; 6.74; 5.86; 2.97 G/4L; G/4L; G/4L; G/4L; G/4L
4 POWDER, FOR SOLUTION ORAL ONCE
Qty: 4000 ML | Refills: 0 | Status: SHIPPED | OUTPATIENT
Start: 2024-03-07 | End: 2024-03-07

## 2024-03-07 NOTE — TELEPHONE ENCOUNTER
Please let patient know that Dr. Treadwell approves her to hold xarelto 24 hours prior to procedure. She will need to hold pletal x 3 days.  Schedule colonoscopy, golytely prep.         ----- Message from Veronica Wharton MA sent at 3/5/2024  3:51 PM CST -----  Xarelto clearance

## 2024-03-15 ENCOUNTER — HOSPITAL ENCOUNTER (OUTPATIENT)
Dept: CT IMAGING | Facility: HOSPITAL | Age: 64
Discharge: HOME | End: 2024-03-15
Payer: MEDICARE

## 2024-03-15 DIAGNOSIS — R91.8 LUNG NODULES: ICD-10-CM

## 2024-03-15 PROCEDURE — 71250 CT THORAX DX C-: CPT

## 2024-04-03 ENCOUNTER — APPOINTMENT (OUTPATIENT)
Dept: NUCLEAR MEDICINE | Facility: HOSPITAL | Age: 64
End: 2024-04-03
Payer: MEDICARE

## 2024-04-03 ENCOUNTER — APPOINTMENT (OUTPATIENT)
Dept: GENERAL RADIOLOGY | Facility: HOSPITAL | Age: 64
End: 2024-04-03
Payer: MEDICARE

## 2024-04-03 ENCOUNTER — HOSPITAL ENCOUNTER (OUTPATIENT)
Facility: HOSPITAL | Age: 64
Setting detail: OBSERVATION
Discharge: HOME | End: 2024-04-07
Attending: FAMILY MEDICINE | Admitting: FAMILY MEDICINE
Payer: MEDICARE

## 2024-04-03 PROBLEM — R07.89 CHEST DISCOMFORT: Status: ACTIVE | Noted: 2024-04-03

## 2024-04-03 PROBLEM — D64.9 ANEMIA: Status: ACTIVE | Noted: 2024-04-03

## 2024-04-03 PROBLEM — R00.2 PALPITATIONS: Status: ACTIVE | Noted: 2024-04-03

## 2024-04-03 PROBLEM — R07.9 CHEST PAIN: Status: ACTIVE | Noted: 2024-04-03

## 2024-04-03 LAB
ALBUMIN SERPL-MCNC: 3.8 G/DL (ref 3.5–5.2)
ALBUMIN/GLOB SERPL: 1.3 G/DL
ALP SERPL-CCNC: 126 U/L (ref 39–117)
ALT SERPL W P-5'-P-CCNC: 13 U/L (ref 1–33)
ANION GAP SERPL CALCULATED.3IONS-SCNC: 12 MMOL/L (ref 5–15)
AST SERPL-CCNC: 15 U/L (ref 1–32)
BASOPHILS # BLD AUTO: 0.1 10*3/MM3 (ref 0–0.2)
BASOPHILS NFR BLD AUTO: 1.1 % (ref 0–1.5)
BILIRUB SERPL-MCNC: 0.3 MG/DL (ref 0–1.2)
BUN SERPL-MCNC: 8 MG/DL (ref 8–23)
BUN/CREAT SERPL: 7.8 (ref 7–25)
CALCIUM SPEC-SCNC: 9.2 MG/DL (ref 8.6–10.5)
CHLORIDE SERPL-SCNC: 108 MMOL/L (ref 98–107)
CO2 SERPL-SCNC: 22 MMOL/L (ref 22–29)
CREAT SERPL-MCNC: 1.02 MG/DL (ref 0.57–1)
D DIMER PPP FEU-MCNC: 1.02 MCGFEU/ML (ref 0–0.66)
DEPRECATED RDW RBC AUTO: 40 FL (ref 37–54)
EGFRCR SERPLBLD CKD-EPI 2021: 60.8 ML/MIN/1.73
EOSINOPHIL # BLD AUTO: 0.21 10*3/MM3 (ref 0–0.4)
EOSINOPHIL NFR BLD AUTO: 2.4 % (ref 0.3–6.2)
ERYTHROCYTE [DISTWIDTH] IN BLOOD BY AUTOMATED COUNT: 14.8 % (ref 12.3–15.4)
GEN 5 2HR TROPONIN T REFLEX: 9 NG/L
GLOBULIN UR ELPH-MCNC: 3 GM/DL
GLUCOSE SERPL-MCNC: 100 MG/DL (ref 65–99)
HCT VFR BLD AUTO: 30.6 % (ref 34–46.6)
HEMOCCULT STL QL: NEGATIVE
HGB BLD-MCNC: 8.9 G/DL (ref 12–15.9)
IMM GRANULOCYTES # BLD AUTO: 0.04 10*3/MM3 (ref 0–0.05)
IMM GRANULOCYTES NFR BLD AUTO: 0.4 % (ref 0–0.5)
INR PPP: 1.61 (ref 0.91–1.09)
IRON 24H UR-MRATE: 15 MCG/DL (ref 37–145)
IRON SATN MFR SERPL: 3 % (ref 20–50)
LYMPHOCYTES # BLD AUTO: 1.65 10*3/MM3 (ref 0.7–3.1)
LYMPHOCYTES NFR BLD AUTO: 18.5 % (ref 19.6–45.3)
MCH RBC QN AUTO: 21.8 PG (ref 26.6–33)
MCHC RBC AUTO-ENTMCNC: 29.1 G/DL (ref 31.5–35.7)
MCV RBC AUTO: 75 FL (ref 79–97)
MONOCYTES # BLD AUTO: 0.73 10*3/MM3 (ref 0.1–0.9)
MONOCYTES NFR BLD AUTO: 8.2 % (ref 5–12)
NEUTROPHILS NFR BLD AUTO: 6.18 10*3/MM3 (ref 1.7–7)
NEUTROPHILS NFR BLD AUTO: 69.4 % (ref 42.7–76)
NRBC BLD AUTO-RTO: 0 /100 WBC (ref 0–0.2)
PLATELET # BLD AUTO: 313 10*3/MM3 (ref 140–450)
PMV BLD AUTO: 10.8 FL (ref 6–12)
POTASSIUM SERPL-SCNC: 3.8 MMOL/L (ref 3.5–5.2)
PROT SERPL-MCNC: 6.8 G/DL (ref 6–8.5)
PROTHROMBIN TIME: 19.8 SECONDS (ref 11.8–14.8)
RBC # BLD AUTO: 4.08 10*6/MM3 (ref 3.77–5.28)
SODIUM SERPL-SCNC: 142 MMOL/L (ref 136–145)
TIBC SERPL-MCNC: 468 MCG/DL (ref 298–536)
TRANSFERRIN SERPL-MCNC: 314 MG/DL (ref 200–360)
TROPONIN T DELTA: -3 NG/L
TROPONIN T SERPL HS-MCNC: 12 NG/L
TROPONIN T SERPL HS-MCNC: 9 NG/L
TSH SERPL DL<=0.05 MIU/L-ACNC: 2.53 UIU/ML (ref 0.27–4.2)
WBC NRBC COR # BLD AUTO: 8.91 10*3/MM3 (ref 3.4–10.8)

## 2024-04-03 PROCEDURE — 84484 ASSAY OF TROPONIN QUANT: CPT | Performed by: PHYSICIAN ASSISTANT

## 2024-04-03 PROCEDURE — G0378 HOSPITAL OBSERVATION PER HR: HCPCS

## 2024-04-03 PROCEDURE — 93010 ELECTROCARDIOGRAM REPORT: CPT | Performed by: EMERGENCY MEDICINE

## 2024-04-03 PROCEDURE — 83540 ASSAY OF IRON: CPT | Performed by: PHYSICIAN ASSISTANT

## 2024-04-03 PROCEDURE — 71046 X-RAY EXAM CHEST 2 VIEWS: CPT

## 2024-04-03 PROCEDURE — 80053 COMPREHEN METABOLIC PANEL: CPT | Performed by: PHYSICIAN ASSISTANT

## 2024-04-03 PROCEDURE — 78580 LUNG PERFUSION IMAGING: CPT

## 2024-04-03 PROCEDURE — 82272 OCCULT BLD FECES 1-3 TESTS: CPT | Performed by: FAMILY MEDICINE

## 2024-04-03 PROCEDURE — G0379 DIRECT REFER HOSPITAL OBSERV: HCPCS

## 2024-04-03 PROCEDURE — 85379 FIBRIN DEGRADATION QUANT: CPT | Performed by: PHYSICIAN ASSISTANT

## 2024-04-03 PROCEDURE — A9540 TC99M MAA: HCPCS | Performed by: FAMILY MEDICINE

## 2024-04-03 PROCEDURE — 85610 PROTHROMBIN TIME: CPT | Performed by: PHYSICIAN ASSISTANT

## 2024-04-03 PROCEDURE — 0 TECHNETIUM ALBUMIN AGGREGATED: Performed by: FAMILY MEDICINE

## 2024-04-03 PROCEDURE — 84443 ASSAY THYROID STIM HORMONE: CPT | Performed by: PHYSICIAN ASSISTANT

## 2024-04-03 PROCEDURE — 84466 ASSAY OF TRANSFERRIN: CPT | Performed by: PHYSICIAN ASSISTANT

## 2024-04-03 PROCEDURE — 85025 COMPLETE CBC W/AUTO DIFF WBC: CPT | Performed by: PHYSICIAN ASSISTANT

## 2024-04-03 PROCEDURE — 96372 THER/PROPH/DIAG INJ SC/IM: CPT

## 2024-04-03 PROCEDURE — 25010000002 ENOXAPARIN PER 10 MG: Performed by: PHYSICIAN ASSISTANT

## 2024-04-03 PROCEDURE — 93005 ELECTROCARDIOGRAM TRACING: CPT | Performed by: PHYSICIAN ASSISTANT

## 2024-04-03 PROCEDURE — 99204 OFFICE O/P NEW MOD 45 MIN: CPT | Performed by: PHYSICIAN ASSISTANT

## 2024-04-03 RX ORDER — ACETAMINOPHEN 325 MG/1
650 TABLET ORAL EVERY 6 HOURS PRN
Status: DISCONTINUED | OUTPATIENT
Start: 2024-04-03 | End: 2024-04-07 | Stop reason: HOSPADM

## 2024-04-03 RX ORDER — NITROGLYCERIN 0.4 MG/1
0.4 TABLET SUBLINGUAL
Status: DISCONTINUED | OUTPATIENT
Start: 2024-04-03 | End: 2024-04-07 | Stop reason: HOSPADM

## 2024-04-03 RX ORDER — PANTOPRAZOLE SODIUM 40 MG/1
40 TABLET, DELAYED RELEASE ORAL
Status: DISCONTINUED | OUTPATIENT
Start: 2024-04-03 | End: 2024-04-07 | Stop reason: HOSPADM

## 2024-04-03 RX ORDER — ATORVASTATIN CALCIUM 10 MG/1
20 TABLET, FILM COATED ORAL NIGHTLY
Status: DISCONTINUED | OUTPATIENT
Start: 2024-04-03 | End: 2024-04-07 | Stop reason: HOSPADM

## 2024-04-03 RX ORDER — SODIUM CHLORIDE 9 MG/ML
40 INJECTION, SOLUTION INTRAVENOUS AS NEEDED
Status: DISCONTINUED | OUTPATIENT
Start: 2024-04-03 | End: 2024-04-07 | Stop reason: HOSPADM

## 2024-04-03 RX ORDER — POLYETHYLENE GLYCOL 3350 17 G/17G
17 POWDER, FOR SOLUTION ORAL DAILY PRN
Status: DISCONTINUED | OUTPATIENT
Start: 2024-04-03 | End: 2024-04-07 | Stop reason: HOSPADM

## 2024-04-03 RX ORDER — METOPROLOL SUCCINATE 50 MG/1
50 TABLET, EXTENDED RELEASE ORAL DAILY
Status: DISCONTINUED | OUTPATIENT
Start: 2024-04-03 | End: 2024-04-07 | Stop reason: HOSPADM

## 2024-04-03 RX ORDER — ONDANSETRON 4 MG/1
4 TABLET, ORALLY DISINTEGRATING ORAL EVERY 6 HOURS PRN
Status: DISCONTINUED | OUTPATIENT
Start: 2024-04-03 | End: 2024-04-07 | Stop reason: HOSPADM

## 2024-04-03 RX ORDER — BISACODYL 5 MG/1
5 TABLET, DELAYED RELEASE ORAL DAILY PRN
Status: DISCONTINUED | OUTPATIENT
Start: 2024-04-03 | End: 2024-04-07 | Stop reason: HOSPADM

## 2024-04-03 RX ORDER — ENOXAPARIN SODIUM 100 MG/ML
1 INJECTION SUBCUTANEOUS EVERY 12 HOURS SCHEDULED
Status: DISCONTINUED | OUTPATIENT
Start: 2024-04-04 | End: 2024-04-07 | Stop reason: HOSPADM

## 2024-04-03 RX ORDER — BISACODYL 10 MG
10 SUPPOSITORY, RECTAL RECTAL DAILY PRN
Status: DISCONTINUED | OUTPATIENT
Start: 2024-04-03 | End: 2024-04-07 | Stop reason: HOSPADM

## 2024-04-03 RX ORDER — CILOSTAZOL 100 MG/1
100 TABLET ORAL 2 TIMES DAILY
Status: DISCONTINUED | OUTPATIENT
Start: 2024-04-03 | End: 2024-04-07 | Stop reason: HOSPADM

## 2024-04-03 RX ORDER — SODIUM CHLORIDE 0.9 % (FLUSH) 0.9 %
10 SYRINGE (ML) INJECTION AS NEEDED
Status: DISCONTINUED | OUTPATIENT
Start: 2024-04-03 | End: 2024-04-07 | Stop reason: HOSPADM

## 2024-04-03 RX ORDER — ENOXAPARIN SODIUM 100 MG/ML
1 INJECTION SUBCUTANEOUS EVERY 12 HOURS
Qty: 1 ML | Refills: 0 | Status: COMPLETED | OUTPATIENT
Start: 2024-04-03 | End: 2024-04-03

## 2024-04-03 RX ORDER — AMOXICILLIN 250 MG
2 CAPSULE ORAL 2 TIMES DAILY PRN
Status: DISCONTINUED | OUTPATIENT
Start: 2024-04-03 | End: 2024-04-07 | Stop reason: HOSPADM

## 2024-04-03 RX ORDER — SODIUM CHLORIDE 0.9 % (FLUSH) 0.9 %
10 SYRINGE (ML) INJECTION EVERY 12 HOURS SCHEDULED
Status: DISCONTINUED | OUTPATIENT
Start: 2024-04-03 | End: 2024-04-07 | Stop reason: HOSPADM

## 2024-04-03 RX ORDER — CALCIUM CARBONATE 500 MG/1
2 TABLET, CHEWABLE ORAL 2 TIMES DAILY PRN
Status: DISCONTINUED | OUTPATIENT
Start: 2024-04-03 | End: 2024-04-07 | Stop reason: HOSPADM

## 2024-04-03 RX ADMIN — ENOXAPARIN SODIUM 100 MG: 100 INJECTION SUBCUTANEOUS at 12:41

## 2024-04-03 RX ADMIN — FOLIC ACID-PYRIDOXINE-CYANOCOBALAMIN TAB 2.5-25-2 MG 1 TABLET: 2.5-25-2 TAB at 20:43

## 2024-04-03 RX ADMIN — PANTOPRAZOLE SODIUM 40 MG: 40 TABLET, DELAYED RELEASE ORAL at 20:43

## 2024-04-03 RX ADMIN — Medication 10 ML: at 12:42

## 2024-04-03 RX ADMIN — CILOSTAZOL 100 MG: 100 TABLET ORAL at 20:43

## 2024-04-03 RX ADMIN — Medication 10 ML: at 20:44

## 2024-04-03 RX ADMIN — KIT FOR THE PREPARATION OF TECHNETIUM TC 99M ALBUMIN AGGREGATED 1 DOSE: 2.5 INJECTION, POWDER, FOR SOLUTION INTRAVENOUS at 14:05

## 2024-04-03 RX ADMIN — ATORVASTATIN CALCIUM 20 MG: 10 TABLET, FILM COATED ORAL at 20:43

## 2024-04-03 RX ADMIN — METOPROLOL SUCCINATE 50 MG: 50 TABLET, EXTENDED RELEASE ORAL at 22:14

## 2024-04-03 RX ADMIN — ACETAMINOPHEN 650 MG: 325 TABLET, FILM COATED ORAL at 15:36

## 2024-04-03 RX ADMIN — CILOSTAZOL 100 MG: 100 TABLET ORAL at 12:41

## 2024-04-03 NOTE — CONSULTS
"[facility] HEART GROUP CONSULT NOTE    Referring Provider: Nikolai Treadwell MD    Reason for Consultation:  chest discomfort     Chief complaint: \"just feel awful\"     Subjective .     History of present illness:  Tabitha Gonzalez is a 64 y.o. female with history of PVD with previous aortobyfemoral bypass and subsequent stenting, HTN, previous tobacco use and moderate COPD who presents to her PCP today for 2-3 week history of increasing fatigue, malaise, heart racing and chest discomfort and was directly admitted from their office. She has been followed by Dr. Young and is on chronic xarelto for PVD. She reports she has always required anticoagulation with coumadin and then xarelto due to what sounds like an embolic phenomenon to her foot remotely. She denies any history of PAF, atrial flutter or prior CVA.     She has not ever needed intervention to her coronaries and does not typically have complaints of angina. However, she recently has had extreme fatigue and notes her heart racing on minimal exertion such as vacuuming, cleaning house, etc. EKG today does not show any ischemic changes and troponin has been normal x 1. TSH is normal, but she is found to be anemic with Hgb at 8.9 with microcytic indices. She denies any melenotic stools, but has \"heartburn constantly no matter what she eats or drinks.\" She drinks coffee and caffeine tea daily, but denies current tobacco use. She denies any hemoptysis or vomiting with coffee grounds. Previous EGD in 2017 was normal.     She is followed for moderate COPD by pulmonary with recent PFTs.         History  Past Medical History:   Diagnosis Date    AVM (arteriovenous malformation) of colon     Colon polyp     COPD (chronic obstructive pulmonary disease)     DVT (deep venous thrombosis)     Family history of colon cancer     Family history of colonic polyps     GERD (gastroesophageal reflux disease)     History of adenomatous polyp of colon     History of " transfusion     after vascular surgery    Hypercholesterolemia     Hypertension     IBS (irritable bowel syndrome)     Migraine     Peripheral artery disease     PONV (postoperative nausea and vomiting)     PVD (peripheral vascular disease)    ,   Past Surgical History:   Procedure Laterality Date    AORTA FEMORAL BYPASS      APPENDECTOMY      CHOLECYSTECTOMY      COLONOSCOPY  10/28/2011    COLONOSCOPY N/A 6/19/2017    Procedure: COLONOSCOPY WITH ANESTHESIA;  Surgeon: Zack Do MD;  Location: [facility] ENDOSCOPY;  Service:     COLONOSCOPY N/A 12/3/2020    Procedure: COLONOSCOPY WITH ANESTHESIA;  Surgeon: Zack Do MD;  Location: [facility] ENDOSCOPY;  Service: Gastroenterology;  Laterality: N/A;  pre hx adenomatous colon polyp, family hx colon ca  post polyps  Nikolai Treadwell MD    ENDOSCOPY  01/26/2007    ENDOSCOPY N/A 6/19/2017    Procedure: ESOPHAGOGASTRODUODENOSCOPY WITH ANESTHESIA;  Surgeon: Zack Do MD;  Location: [facility] ENDOSCOPY;  Service:     HYSTERECTOMY      PERIPHERAL ARTERIAL STENT GRAFT     ,   Family History   Problem Relation Age of Onset    Coronary artery disease Mother     Cancer Father         lung    Rectal cancer Sister         In her 50's    Colon cancer Sister         rectal cancer in her 50 's    Stomach cancer Brother     Esophageal cancer Neg Hx     Liver cancer Neg Hx     Liver disease Neg Hx    ,   Social History     Tobacco Use    Smoking status: Former     Current packs/day: 0.00     Types: Cigarettes     Quit date: 2014     Years since quitting: 10.2    Smokeless tobacco: Never   Vaping Use    Vaping status: Former   Substance Use Topics    Alcohol use: Yes     Comment: Rarely     Drug use: No   ,     Medications      Current Facility-Administered Medications:     atorvastatin (LIPITOR) tablet 20 mg, 20 mg, Oral, Nightly, Julissa Mcclain PA    sennosides-docusate (PERICOLACE) 8.6-50 MG per tablet 2 tablet, 2 tablet, Oral, BID PRN **AND** polyethylene  glycol (MIRALAX) packet 17 g, 17 g, Oral, Daily PRN **AND** bisacodyl (DULCOLAX) EC tablet 5 mg, 5 mg, Oral, Daily PRN **AND** bisacodyl (DULCOLAX) suppository 10 mg, 10 mg, Rectal, Daily PRN, Julissa Mcclain PA    calcium carbonate (TUMS) chewable tablet 500 mg (200 mg elemental), 2 tablet, Oral, BID PRN, Julissa Mcclain PA    Calcium Replacement - Follow Nurse / BPA Driven Protocol, , Does not apply, PRN, Julissa Mcclain PA    cilostazol (PLETAL) tablet 100 mg, 100 mg, Oral, BID, Julissa Mcclain PA, 100 mg at 04/03/24 1241    [START ON 4/4/2024] Enoxaparin Sodium (LOVENOX) syringe 100 mg, 1 mg/kg, Subcutaneous, Q12H, Yaakov Tovar DO    folic acid-pyridoxine-cyanocobalamin (FOLBIC) tablet 1 tablet, 1 tablet, Oral, Daily, Julissa Mcclain PA    Magnesium Standard Dose Replacement - Follow Nurse / BPA Driven Protocol, , Does not apply, PRN, Julissa Mcclain PA    metoprolol succinate XL (TOPROL-XL) 24 hr tablet 50 mg, 50 mg, Oral, Daily, Julissa Mcclain PA    nitroglycerin (NITROSTAT) SL tablet 0.4 mg, 0.4 mg, Sublingual, Q5 Min PRN, Julissa Mcclain PA    ondansetron ODT (ZOFRAN-ODT) disintegrating tablet 4 mg, 4 mg, Oral, Q6H PRN, Julissa Mcclain PA    pantoprazole (PROTONIX) EC tablet 40 mg, 40 mg, Oral, Q AM, Julissa Mcclain PA    Phosphorus Replacement - Follow Nurse / BPA Driven Protocol, , Does not apply, PRN, Julissa Mcclain PA    Potassium Replacement - Follow Nurse / BPA Driven Protocol, , Does not apply, PRN, Julissa Mcclain PA    [Held by provider] rivaroxaban (XARELTO) tablet 20 mg, 20 mg, Oral, Daily, Julissa Mcclain PA    sodium chloride 0.9 % flush 10 mL, 10 mL, Intravenous, Q12H, Julissa Mcclain PA, 10 mL at 04/03/24 1242    sodium chloride 0.9 % flush 10 mL, 10 mL, Intravenous, PRN, Julissa Mcclain PA    sodium chloride 0.9 % infusion 40 mL, 40 mL, Intravenous, PRN,  "Julissa Mcclain PA    Allergies:  Contrast dye (echo or unknown ct/mr), Azithromycin, Codeine, Penicillin g sodium, and Sulfa antibiotics    Review of Systems  ROS    Objective     Physical Exam:  Visit Vitals  /75 (BP Location: Right arm, Patient Position: Lying)   Pulse 81   Temp 97.6 °F (36.4 °C) (Oral)   Resp 18   Ht 175.3 cm (69\")   Wt 102 kg (223 lb 12.8 oz)   SpO2 96%   BMI 33.05 kg/m²       Vitals reviewed.   Constitutional:       Appearance: Overweight and not in distress.   Eyes:      Extraocular Movements: Extraocular movements intact.      Conjunctiva/sclera: Conjunctivae normal.      Pupils: Pupils are equal, round, and reactive to light.   HENT:      Head: Normocephalic and atraumatic.      Nose: Nose normal.    Mouth/Throat:      Lips: Pink.      Mouth: Mucous membranes are moist.      Pharynx: Oropharynx is clear.   Neck:      Vascular: No carotid bruit or JVD. JVD normal.   Pulmonary:      Effort: Pulmonary effort is normal.      Breath sounds: Normal breath sounds.   Chest:      Chest wall: Not tender to palpatation.   Cardiovascular:      PMI at left midclavicular line. Normal rate. Regular rhythm. Normal S1. Normal S2.       Murmurs: There is a grade 2/6 harsh midsystolic murmur at the URSB, radiating to the neck.      No gallop.  No rub.   Pulses:     Radial: 2+ bilaterally.  Edema:     Peripheral edema absent.   Abdominal:      General: Bowel sounds are normal.      Palpations: Abdomen is soft.      Tenderness: There is abdominal tenderness.      Comments: Epigastric and mid left quad tenderness   Musculoskeletal:      Extremities: No clubbing present.     Cervical back: Normal range of motion. Skin:     General: Skin is warm and dry.   Neurological:      General: No focal deficit present.      Mental Status: Alert and oriented to person, place, and time.   Psychiatric:         Attention and Perception: Attention normal.         Mood and Affect: Affect normal.         Speech: " Speech normal.         Behavior: Behavior normal.         Cognition and Memory: Cognition normal.           Results Review:   I reviewed the patient's new clinical results.          Imaging Results (Last 72 Hours)       Procedure Component Value Units Date/Time    XR Chest PA & Lateral [[id number]] Collected: 04/03/24 1216     Updated: 04/03/24 1219    Narrative:      EXAM: XR CHEST PA AND LATERAL- 4/3/2024 11:04 AM     HISTORY: chest pain       COMPARISON: None.     TECHNIQUE: Frontal and lateral radiographs of the chest was obtained.     FINDINGS:      Support Devices: None.     Cardiac and Mediastinal Silhouettes: Normal.     Lungs/Pleura: No focal consolidation. No sizable pleural effusion. No  visible pneumothorax.     Osseous structures: No acute osseous finding.     Other: None.       Impression:         No acute cardiopulmonary abnormality.        This report was signed and finalized on 4/3/2024 12:16 PM by Jay Black.                       Assessment & Plan       Chest discomfort    PVD (peripheral vascular disease) with claudication    Anemia    Palpitations    Chest discomfort: So far, no evidence of ACS, but will wait for second troponin. She does have known PVD and has coronary calcifications on prior studies. However, until recently she has not had any complaints of angina. EKG is stable without ischemic changes and she is not having any symptoms currently.   -She is compliant with statin.   -I suspect her newly found anemia with Hgb of 8.9 is likely affecting her symptoms.     Anemia: New finding on chronic anticoagulation. Given her persistent heartburn and tenderness in epigastric area, I would be concerned of slow GI loss.   -Continue PPI, check iron studies   -encourage caffeine modification and avoid tobacco use/ nicotine products  -Defer to Dr. Treadwell with outpatient EGD.     PVD: Chronically anticoagulated with xarelto, followed by Dr. Young. She states she has always required  anticoagulation for this condition.   -Currently on hold, receiving lovenox. Will reach out to vascular about recs for antiplatelet therapy.           Further orders per Dr. Tovar    Thank you for asking us to follow this patient with you.       Electronically signed by RADHA Hernandez, 04/03/24, 12:29 PM CDT.

## 2024-04-03 NOTE — H&P
"  [facility]  History and Physical    Patient:  Tabitha Gonzalez  MRN: [id number]    CHIEF COMPLAINT:  chest pressure     History Obtained From: the patient   PCP: Nikolai Treadwell MD    HISTORY OF PRESENT ILLNESS:   The patient is a 64 y.o. female who presents with 3 week onset of exertional left sided chest pressure radiating to left arm with associated shortness of breath, dizziness and profound weakness.  Patient without known hx of CAD but does have known hx of mild aortic stenosis per echo 10/2023.  Patient reports symptoms worsening and \"I just feel awful.\" She was directly admitted from our office for cardiac work up.      REVIEW OF SYSTEMS:    Constitutional: Negative for activity change, appetite change, chills, fatigue and fever.   HENT: Negative.  Negative for congestion, sinus pressure and sore throat.    Eyes: Negative for pain and discharge.   Respiratory: Negative.  Negative for cough, chest tightness, shortness of breath and wheezing.    Cardiovascular Positive for chest pressure and mild leg swelling.   Gastrointestinal: Negative for abdominal distention, abdominal pain, diarrhea, nausea and vomiting.   Genitourinary: Negative for difficulty urinating, flank pain, hematuria and urgency.   Musculoskeletal: Negative for arthralgias and back pain.   Skin: Negative for color change, pallor and rash.   Neurological: Negative for dizziness, syncope, numbness and headaches.   Psychiatric/Behavioral: Negative for agitation, behavioral problems and confusion.       Past Medical History:  Past Medical History:   Diagnosis Date    AVM (arteriovenous malformation) of colon     Colon polyp     COPD (chronic obstructive pulmonary disease)     DVT (deep venous thrombosis)     Family history of colon cancer     Family history of colonic polyps     GERD (gastroesophageal reflux disease)     History of adenomatous polyp of colon     History of transfusion     after vascular surgery    " Hypercholesterolemia     Hypertension     IBS (irritable bowel syndrome)     Migraine     Peripheral artery disease     PONV (postoperative nausea and vomiting)     PVD (peripheral vascular disease)        Past Surgical History:  Past Surgical History:   Procedure Laterality Date    AORTA FEMORAL BYPASS      APPENDECTOMY      CHOLECYSTECTOMY      COLONOSCOPY  10/28/2011    COLONOSCOPY N/A 6/19/2017    Procedure: COLONOSCOPY WITH ANESTHESIA;  Surgeon: Zack Do MD;  Location: [facility] ENDOSCOPY;  Service:     COLONOSCOPY N/A 12/3/2020    Procedure: COLONOSCOPY WITH ANESTHESIA;  Surgeon: Zack Do MD;  Location: [facility] ENDOSCOPY;  Service: Gastroenterology;  Laterality: N/A;  pre hx adenomatous colon polyp, family hx colon ca  post polyps  Nikolai Treadwell MD    ENDOSCOPY  01/26/2007    ENDOSCOPY N/A 6/19/2017    Procedure: ESOPHAGOGASTRODUODENOSCOPY WITH ANESTHESIA;  Surgeon: Zack Do MD;  Location: [facility] ENDOSCOPY;  Service:     HYSTERECTOMY      PERIPHERAL ARTERIAL STENT GRAFT         Medications Prior to Admission:    No medications prior to admission.       Allergies:  Contrast dye (echo or unknown ct/mr), Azithromycin, Codeine, Penicillin g sodium, and Sulfa antibiotics    Social History:   Social History     Socioeconomic History    Marital status:    Tobacco Use    Smoking status: Former     Current packs/day: 0.00     Types: Cigarettes     Quit date: 2014     Years since quitting: 10.2    Smokeless tobacco: Never   Vaping Use    Vaping status: Every Day   Substance and Sexual Activity    Alcohol use: Yes     Comment: Rarely     Drug use: No    Sexual activity: Defer       Family History:   Family History   Problem Relation Age of Onset    Coronary artery disease Mother     Cancer Father         lung    Rectal cancer Sister         In her 50's    Colon cancer Sister         rectal cancer in her 50 's    Stomach cancer Brother     Esophageal cancer Neg Hx     Liver cancer Neg  Hx     Liver disease Neg Hx            Physical Exam:    Vitals: There were no vitals taken for this visit.       General Appearance:    Alert, cooperative, in no acute distress   Head:    Normocephalic, without obvious abnormality, atraumatic   Eyes:            Lids and lashes normal, conjunctivae and sclerae normal, no   icterus, no pallor, corneas clear, PERRLA   Ears:    Ears appear intact with no abnormalities noted   Throat:   No oral lesions, no thrush, oral mucosa moist   Neck:   No adenopathy, supple, trachea midline, no thyromegaly, no   carotid bruit, no JVD   Back:     No kyphosis present, no scoliosis present, no skin lesions,      erythema or scars, no tenderness to percussion or                   palpation,   range of motion normal   Lungs:     Clear to auscultation,respirations regular, even and                  unlabored    Heart:    Regular rhythm and normal rate, normal S1 and S2, 2/6 systolic        murmur, no gallop, no rub, no click   Chest Wall:    No abnormalities observed   Abdomen:     Normal bowel sounds, no masses, no organomegaly, soft        non-tender, non-distended, no guarding, no rebound                tenderness   Rectal:     Deferred   Extremities:   Moves all extremities well, no edema, no cyanosis, no             redness   Pulses:   Pulses palpable and equal bilaterally   Skin:   No bleeding, bruising or rash   Lymph nodes:   No palpable adenopathy   Neurologic:   Cranial nerves 2 - 12 grossly intact, sensation intact, DTR       present and equal bilaterally       Lab Results (last 24 hours)       ** No results found for the last 24 hours. **             -----------------------------------------------------------------  EKG: pending  Radiology:     CT Chest Low Dose Follow Up Without Contrast    Result Date: 3/15/2024  EXAMINATION: CT CHEST LOW DOSE FOLLOW UP WITHOUT CONTRAST- 3/15/2024 6:36 AM  HISTORY:follow up nodule; R91.8-Other nonspecific abnormal finding of lung field   COMPARISON: 11/11/2023  DLP: 64.59 mGy.cm  TECHNIQUE: Noncontrast CT of the chest was performed using low-dose protocol.  FINDINGS:  NODULES: 1.  Subsolid anterior apical LEFT upper lobe nodule with mean diameter of 6.5 mm. When measured at the same location on the previous examination, this is unchanged. I don't definitively see a solid component on today's examination. 2.  Redemonstration of too numerous to count additional small centrilobular micronodules in both lungs. 3.  Typical fissural nodule along the RIGHT major fissure in the RIGHT lower lobe (series 4, image 124) is unchanged and has a mean diameter of 6.9 mm.  LUNGS: Centrilobular emphysema and some paraseptal emphysema. No consolidative change.  AIRWAY: The airways are clear.  PLEURA: No effusion or pleural mass.  VASCULATURE: There is calcified atheromatous plaque in the aorta without aneurysmal dilation. The pulmonary arteries are normal in caliber.  HEART: The heart is normal in size. There is no pericardial effusion. Advanced coronary artery atheromatous calcification.  MEDIASTINUM AND LYMPH NODES: No suspicious hilar or mediastinal adenopathy. Incidental granulomatous calcification.  CHEST WALL AND BONY STRUCTURES: Small intramuscular lipoma along the LEFT lateral chest wall (series 2, image 158) is unchanged. No acute bony abnormality.  UPPER ABDOMEN: Previous cholecystectomy. 3.6 cm LEFT adrenal mass redemonstrated. This has been present since 2014.        1.  No new or enlarging pulmonary nodules. Return for low-dose screening CT chest in 12 months.  2.  Advanced coronary calcifications.  3.  Emphysema..   Lung-RADS 2: Benign Nodules with a very low likelihood of becoming a clinically active cancer due to size or lack of growth. Continue annual screening with low dose CT in 12 months.   This report was signed and finalized on 3/15/2024 8:30 AM by Dr. Carlito Royal MD.        Assessment and Plan     Chest pressure r/o  ACS  Palpitations  Dizziness  Mild Aortic stenosis  PVD  Hx DVT on xarelto-will hold xarelto, start lovenox    Stat troponin, dimer, EKG and cardiology consult       RADHA Park    Pt. was seen and independently examined by me.  I have reviewed the PA/ARNP's note and agree with above.      Anemia: Hemoglobin changed to 8.9 rather abruptly.  Negative FOBT.  Severely iron deficient.  -Replace iron IV daily for 3 days, started 4/4/2024.    Chest pressure: Likely secondary to above.  Troponin normal.  Cardiology consulted, appreciate recommendations.    History of DVT: On Xarelto, held during hospitalization on Lovenox.    CODE STATUS: Full    DVT prophylaxis: As above    Disposition: Inpatient    Electronically signed by Glen Tapia MD on 4/4/2024 at 08:21 CDT

## 2024-04-03 NOTE — PLAN OF CARE
Goal Outcome Evaluation:  Plan of Care Reviewed With: patient, spouse        Progress: no change  Outcome Evaluation: Pt was a DA from Dr. Treadwell for CP/SOB x 2 weeks, currently No C/O Cp or SOB, NSR, VSS, Sat's wnl on RA. awaiting orders, safety maintained

## 2024-04-03 NOTE — PLAN OF CARE
Goal Outcome Evaluation:  Plan of Care Reviewed With: patient        Progress: no change  Outcome Evaluation: VSS No c/o chest pain or SOA. Sat's wnl on RA. VQ scan neg. Inst. on need for SOCB verbalized understanding. Hgb 8.9 on admit. Echo ordered. Trop neg. Sinus 76-82 pac's per tele.

## 2024-04-04 ENCOUNTER — APPOINTMENT (OUTPATIENT)
Dept: CARDIOLOGY | Facility: HOSPITAL | Age: 64
End: 2024-04-04
Payer: MEDICARE

## 2024-04-04 LAB
ALBUMIN SERPL-MCNC: 3.7 G/DL (ref 3.5–5.2)
ALBUMIN/GLOB SERPL: 1.4 G/DL
ALP SERPL-CCNC: 119 U/L (ref 39–117)
ALT SERPL W P-5'-P-CCNC: 13 U/L (ref 1–33)
ANION GAP SERPL CALCULATED.3IONS-SCNC: 7 MMOL/L (ref 5–15)
AST SERPL-CCNC: 14 U/L (ref 1–32)
BASOPHILS # BLD AUTO: 0.11 10*3/MM3 (ref 0–0.2)
BASOPHILS NFR BLD AUTO: 1.5 % (ref 0–1.5)
BH CV ECHO MEAS - AO ROOT DIAM: 2.5 CM
BH CV ECHO MEAS - EDV(CUBED): 57.5 ML
BH CV ECHO MEAS - EDV(MOD-SP4): 91.4 ML
BH CV ECHO MEAS - EF(MOD-SP4): 75.7 %
BH CV ECHO MEAS - ESV(CUBED): 20.1 ML
BH CV ECHO MEAS - ESV(MOD-SP4): 22.2 ML
BH CV ECHO MEAS - FS: 29.5 %
BH CV ECHO MEAS - IVS/LVPW: 1.13 CM
BH CV ECHO MEAS - IVSD: 1.11 CM
BH CV ECHO MEAS - LA DIMENSION: 3.5 CM
BH CV ECHO MEAS - LAT PEAK E' VEL: 10.2 CM/SEC
BH CV ECHO MEAS - LV DIASTOLIC VOL/BSA (35-75): 42.2 CM2
BH CV ECHO MEAS - LV MASS(C)D: 128.4 GRAMS
BH CV ECHO MEAS - LV SYSTOLIC VOL/BSA (12-30): 10.3 CM2
BH CV ECHO MEAS - LVIDD: 3.9 CM
BH CV ECHO MEAS - LVIDS: 2.7 CM
BH CV ECHO MEAS - LVOT AREA: 3.5 CM2
BH CV ECHO MEAS - LVOT DIAM: 2.1 CM
BH CV ECHO MEAS - LVPWD: 0.98 CM
BH CV ECHO MEAS - MV A MAX VEL: 105 CM/SEC
BH CV ECHO MEAS - MV DEC SLOPE: 382 CM/SEC2
BH CV ECHO MEAS - MV DEC TIME: 0.21 SEC
BH CV ECHO MEAS - MV E MAX VEL: 80.8 CM/SEC
BH CV ECHO MEAS - MV E/A: 0.77
BH CV ECHO MEAS - PA V2 MAX: 121 CM/SEC
BH CV ECHO MEAS - RAP SYSTOLE: 10 MMHG
BH CV ECHO MEAS - RVSP: 22.1 MMHG
BH CV ECHO MEAS - SI(MOD-SP4): 32 ML/M2
BH CV ECHO MEAS - SV(MOD-SP4): 69.2 ML
BH CV ECHO MEAS - TAPSE (>1.6): 1.88 CM
BH CV ECHO MEAS - TR MAX PG: 12.1 MMHG
BH CV ECHO MEAS - TR MAX VEL: 174 CM/SEC
BH CV XLRA - TDI S': 13.3 CM/SEC
BILIRUB SERPL-MCNC: 0.3 MG/DL (ref 0–1.2)
BUN SERPL-MCNC: 12 MG/DL (ref 8–23)
BUN/CREAT SERPL: 11 (ref 7–25)
CALCIUM SPEC-SCNC: 8.9 MG/DL (ref 8.6–10.5)
CHLORIDE SERPL-SCNC: 110 MMOL/L (ref 98–107)
CO2 SERPL-SCNC: 24 MMOL/L (ref 22–29)
CREAT SERPL-MCNC: 1.09 MG/DL (ref 0.57–1)
DEPRECATED RDW RBC AUTO: 39.3 FL (ref 37–54)
EGFRCR SERPLBLD CKD-EPI 2021: 56.1 ML/MIN/1.73
EOSINOPHIL # BLD AUTO: 0.26 10*3/MM3 (ref 0–0.4)
EOSINOPHIL NFR BLD AUTO: 3.6 % (ref 0.3–6.2)
ERYTHROCYTE [DISTWIDTH] IN BLOOD BY AUTOMATED COUNT: 14.6 % (ref 12.3–15.4)
GLOBULIN UR ELPH-MCNC: 2.6 GM/DL
GLUCOSE BLDC GLUCOMTR-MCNC: 128 MG/DL (ref 70–130)
GLUCOSE SERPL-MCNC: 115 MG/DL (ref 65–99)
HCT VFR BLD AUTO: 29.8 % (ref 34–46.6)
HGB BLD-MCNC: 8.9 G/DL (ref 12–15.9)
LEFT ATRIUM VOLUME INDEX: 22.3 ML/M2
LYMPHOCYTES # BLD AUTO: 2 10*3/MM3 (ref 0.7–3.1)
LYMPHOCYTES NFR BLD AUTO: 27.5 % (ref 19.6–45.3)
MCH RBC QN AUTO: 22.1 PG (ref 26.6–33)
MCHC RBC AUTO-ENTMCNC: 29.9 G/DL (ref 31.5–35.7)
MCV RBC AUTO: 73.9 FL (ref 79–97)
MONOCYTES # BLD AUTO: 0.7 10*3/MM3 (ref 0.1–0.9)
MONOCYTES NFR BLD AUTO: 9.6 % (ref 5–12)
NEUTROPHILS NFR BLD AUTO: 4.17 10*3/MM3 (ref 1.7–7)
NEUTROPHILS NFR BLD AUTO: 57.5 % (ref 42.7–76)
PLATELET # BLD AUTO: 325 10*3/MM3 (ref 140–450)
PMV BLD AUTO: 11.2 FL (ref 6–12)
POTASSIUM SERPL-SCNC: 4 MMOL/L (ref 3.5–5.2)
PROT SERPL-MCNC: 6.3 G/DL (ref 6–8.5)
RBC # BLD AUTO: 4.03 10*6/MM3 (ref 3.77–5.28)
SODIUM SERPL-SCNC: 141 MMOL/L (ref 136–145)
WBC NRBC COR # BLD AUTO: 7.26 10*3/MM3 (ref 3.4–10.8)

## 2024-04-04 PROCEDURE — 96372 THER/PROPH/DIAG INJ SC/IM: CPT

## 2024-04-04 PROCEDURE — 25510000001 PERFLUTREN 6.52 MG/ML SUSPENSION: Performed by: FAMILY MEDICINE

## 2024-04-04 PROCEDURE — 93321 DOPPLER ECHO F-UP/LMTD STD: CPT | Performed by: EMERGENCY MEDICINE

## 2024-04-04 PROCEDURE — 80053 COMPREHEN METABOLIC PANEL: CPT | Performed by: FAMILY MEDICINE

## 2024-04-04 PROCEDURE — 93321 DOPPLER ECHO F-UP/LMTD STD: CPT

## 2024-04-04 PROCEDURE — G0378 HOSPITAL OBSERVATION PER HR: HCPCS

## 2024-04-04 PROCEDURE — 93308 TTE F-UP OR LMTD: CPT | Performed by: EMERGENCY MEDICINE

## 2024-04-04 PROCEDURE — 93308 TTE F-UP OR LMTD: CPT

## 2024-04-04 PROCEDURE — 82948 REAGENT STRIP/BLOOD GLUCOSE: CPT

## 2024-04-04 PROCEDURE — 25810000003 SODIUM CHLORIDE 0.9 % SOLUTION 250 ML FLEX CONT: Performed by: FAMILY MEDICINE

## 2024-04-04 PROCEDURE — 93325 DOPPLER ECHO COLOR FLOW MAPG: CPT

## 2024-04-04 PROCEDURE — 85025 COMPLETE CBC W/AUTO DIFF WBC: CPT | Performed by: FAMILY MEDICINE

## 2024-04-04 PROCEDURE — 25010000002 ENOXAPARIN PER 10 MG: Performed by: EMERGENCY MEDICINE

## 2024-04-04 PROCEDURE — 93325 DOPPLER ECHO COLOR FLOW MAPG: CPT | Performed by: EMERGENCY MEDICINE

## 2024-04-04 PROCEDURE — 25010000002 NA FERRIC GLUC CPLX PER 12.5 MG: Performed by: FAMILY MEDICINE

## 2024-04-04 RX ADMIN — CILOSTAZOL 100 MG: 100 TABLET ORAL at 20:04

## 2024-04-04 RX ADMIN — ENOXAPARIN SODIUM 100 MG: 100 INJECTION SUBCUTANEOUS at 20:04

## 2024-04-04 RX ADMIN — FOLIC ACID-PYRIDOXINE-CYANOCOBALAMIN TAB 2.5-25-2 MG 1 TABLET: 2.5-25-2 TAB at 20:04

## 2024-04-04 RX ADMIN — Medication 10 ML: at 09:09

## 2024-04-04 RX ADMIN — METOPROLOL SUCCINATE 50 MG: 50 TABLET, EXTENDED RELEASE ORAL at 08:37

## 2024-04-04 RX ADMIN — Medication 10 ML: at 20:05

## 2024-04-04 RX ADMIN — SODIUM CHLORIDE 250 MG: 9 INJECTION, SOLUTION INTRAVENOUS at 09:09

## 2024-04-04 RX ADMIN — PANTOPRAZOLE SODIUM 40 MG: 40 TABLET, DELAYED RELEASE ORAL at 06:05

## 2024-04-04 RX ADMIN — CILOSTAZOL 100 MG: 100 TABLET ORAL at 08:40

## 2024-04-04 RX ADMIN — ATORVASTATIN CALCIUM 20 MG: 10 TABLET, FILM COATED ORAL at 20:04

## 2024-04-04 RX ADMIN — ENOXAPARIN SODIUM 100 MG: 100 INJECTION SUBCUTANEOUS at 03:51

## 2024-04-04 RX ADMIN — PERFLUTREN 6.52 MG: 6.52 INJECTION, SUSPENSION INTRAVENOUS at 12:09

## 2024-04-04 NOTE — PLAN OF CARE
Goal Outcome Evaluation:           Progress: no change  Outcome Evaluation: Pt is A&O x4, no c/o pain this shift, she has c/o SOB on exertion, Ocult stool negative.

## 2024-04-04 NOTE — NURSING NOTE
Pt just had a one minute run of HR 170s. This RN checked on the patient. Patient stated she had just gone to the bathroom. Pt denied chest pain while getting up but did say she could tell her heart was fast.     RADHA Mattson paged. Waiting for response.

## 2024-04-04 NOTE — PLAN OF CARE
Goal Outcome Evaluation:  Plan of Care Reviewed With: patient        Progress: improving  Outcome Evaluation: VSS No c/o pain or SOA. IV Iron started today. Sinus 77-92 per tele.

## 2024-04-04 NOTE — NURSING NOTE
RADHA Mattson notified of patient's HR being up to 170s with exertion for one minute (run of PATs).       Cardiology re-consulted per RADHA Mattson.

## 2024-04-05 LAB
ALBUMIN SERPL-MCNC: 3.5 G/DL (ref 3.5–5.2)
ALBUMIN/GLOB SERPL: 1.3 G/DL
ALP SERPL-CCNC: 115 U/L (ref 39–117)
ALT SERPL W P-5'-P-CCNC: 12 U/L (ref 1–33)
ANION GAP SERPL CALCULATED.3IONS-SCNC: 10 MMOL/L (ref 5–15)
ANISOCYTOSIS BLD QL: ABNORMAL
AST SERPL-CCNC: 12 U/L (ref 1–32)
BILIRUB SERPL-MCNC: 0.2 MG/DL (ref 0–1.2)
BUN SERPL-MCNC: 13 MG/DL (ref 8–23)
BUN/CREAT SERPL: 13.4 (ref 7–25)
BURR CELLS BLD QL SMEAR: ABNORMAL
CALCIUM SPEC-SCNC: 8.7 MG/DL (ref 8.6–10.5)
CHLORIDE SERPL-SCNC: 111 MMOL/L (ref 98–107)
CO2 SERPL-SCNC: 21 MMOL/L (ref 22–29)
CREAT SERPL-MCNC: 0.97 MG/DL (ref 0.57–1)
DEPRECATED RDW RBC AUTO: 38.9 FL (ref 37–54)
EGFRCR SERPLBLD CKD-EPI 2021: 64.6 ML/MIN/1.73
EOSINOPHIL # BLD MANUAL: 0.23 10*3/MM3 (ref 0–0.4)
EOSINOPHIL NFR BLD MANUAL: 3.1 % (ref 0.3–6.2)
ERYTHROCYTE [DISTWIDTH] IN BLOOD BY AUTOMATED COUNT: 14.5 % (ref 12.3–15.4)
GLOBULIN UR ELPH-MCNC: 2.6 GM/DL
GLUCOSE SERPL-MCNC: 120 MG/DL (ref 65–99)
HCT VFR BLD AUTO: 30.4 % (ref 34–46.6)
HGB BLD-MCNC: 9 G/DL (ref 12–15.9)
LYMPHOCYTES # BLD MANUAL: 1.31 10*3/MM3 (ref 0.7–3.1)
LYMPHOCYTES NFR BLD MANUAL: 1 % (ref 5–12)
MCH RBC QN AUTO: 22 PG (ref 26.6–33)
MCHC RBC AUTO-ENTMCNC: 29.6 G/DL (ref 31.5–35.7)
MCV RBC AUTO: 74.1 FL (ref 79–97)
MICROCYTES BLD QL: ABNORMAL
MONOCYTES # BLD: 0.07 10*3/MM3 (ref 0.1–0.9)
NEUTROPHILS # BLD AUTO: 5.83 10*3/MM3 (ref 1.7–7)
NEUTROPHILS NFR BLD MANUAL: 78.4 % (ref 42.7–76)
NRBC BLD AUTO-RTO: 0 /100 WBC (ref 0–0.2)
OVALOCYTES BLD QL SMEAR: ABNORMAL
PLAT MORPH BLD: NORMAL
PLATELET # BLD AUTO: 323 10*3/MM3 (ref 140–450)
PMV BLD AUTO: 11 FL (ref 6–12)
POIKILOCYTOSIS BLD QL SMEAR: ABNORMAL
POLYCHROMASIA BLD QL SMEAR: ABNORMAL
POTASSIUM SERPL-SCNC: 3.9 MMOL/L (ref 3.5–5.2)
PROT SERPL-MCNC: 6.1 G/DL (ref 6–8.5)
QT INTERVAL: 394 MS
QTC INTERVAL: 445 MS
RBC # BLD AUTO: 4.1 10*6/MM3 (ref 3.77–5.28)
SODIUM SERPL-SCNC: 142 MMOL/L (ref 136–145)
VARIANT LYMPHS NFR BLD MANUAL: 15.5 % (ref 19.6–45.3)
VARIANT LYMPHS NFR BLD MANUAL: 2.1 % (ref 0–5)
WBC MORPH BLD: NORMAL
WBC NRBC COR # BLD AUTO: 7.44 10*3/MM3 (ref 3.4–10.8)

## 2024-04-05 PROCEDURE — 93010 ELECTROCARDIOGRAM REPORT: CPT | Performed by: INTERNAL MEDICINE

## 2024-04-05 PROCEDURE — 85025 COMPLETE CBC W/AUTO DIFF WBC: CPT | Performed by: FAMILY MEDICINE

## 2024-04-05 PROCEDURE — 86258 DGP ANTIBODY EACH IG CLASS: CPT | Performed by: INTERNAL MEDICINE

## 2024-04-05 PROCEDURE — 25010000002 ENOXAPARIN PER 10 MG: Performed by: EMERGENCY MEDICINE

## 2024-04-05 PROCEDURE — 25010000002 NA FERRIC GLUC CPLX PER 12.5 MG: Performed by: FAMILY MEDICINE

## 2024-04-05 PROCEDURE — 82784 ASSAY IGA/IGD/IGG/IGM EACH: CPT | Performed by: INTERNAL MEDICINE

## 2024-04-05 PROCEDURE — 96365 THER/PROPH/DIAG IV INF INIT: CPT

## 2024-04-05 PROCEDURE — 25810000003 SODIUM CHLORIDE 0.9 % SOLUTION 250 ML FLEX CONT: Performed by: FAMILY MEDICINE

## 2024-04-05 PROCEDURE — 93005 ELECTROCARDIOGRAM TRACING: CPT | Performed by: INTERNAL MEDICINE

## 2024-04-05 PROCEDURE — 86231 EMA EACH IG CLASS: CPT | Performed by: INTERNAL MEDICINE

## 2024-04-05 PROCEDURE — 85007 BL SMEAR W/DIFF WBC COUNT: CPT | Performed by: FAMILY MEDICINE

## 2024-04-05 PROCEDURE — G0378 HOSPITAL OBSERVATION PER HR: HCPCS

## 2024-04-05 PROCEDURE — 99213 OFFICE O/P EST LOW 20 MIN: CPT | Performed by: INTERNAL MEDICINE

## 2024-04-05 PROCEDURE — 99214 OFFICE O/P EST MOD 30 MIN: CPT | Performed by: INTERNAL MEDICINE

## 2024-04-05 PROCEDURE — 96375 TX/PRO/DX INJ NEW DRUG ADDON: CPT

## 2024-04-05 PROCEDURE — 80053 COMPREHEN METABOLIC PANEL: CPT | Performed by: FAMILY MEDICINE

## 2024-04-05 PROCEDURE — 86364 TISS TRNSGLTMNASE EA IG CLAS: CPT | Performed by: INTERNAL MEDICINE

## 2024-04-05 PROCEDURE — 96372 THER/PROPH/DIAG INJ SC/IM: CPT

## 2024-04-05 RX ORDER — DILTIAZEM HYDROCHLORIDE 5 MG/ML
10 INJECTION INTRAVENOUS ONCE
Status: COMPLETED | OUTPATIENT
Start: 2024-04-05 | End: 2024-04-05

## 2024-04-05 RX ORDER — DILTIAZEM HCL/D5W 125 MG/125
5-15 PLASTIC BAG, INJECTION (ML) INTRAVENOUS
Status: DISCONTINUED | OUTPATIENT
Start: 2024-04-05 | End: 2024-04-07 | Stop reason: HOSPADM

## 2024-04-05 RX ADMIN — METOPROLOL SUCCINATE 50 MG: 50 TABLET, EXTENDED RELEASE ORAL at 08:29

## 2024-04-05 RX ADMIN — ENOXAPARIN SODIUM 100 MG: 100 INJECTION SUBCUTANEOUS at 17:25

## 2024-04-05 RX ADMIN — Medication 10 ML: at 21:07

## 2024-04-05 RX ADMIN — Medication 10 ML: at 08:29

## 2024-04-05 RX ADMIN — FOLIC ACID-PYRIDOXINE-CYANOCOBALAMIN TAB 2.5-25-2 MG 1 TABLET: 2.5-25-2 TAB at 21:06

## 2024-04-05 RX ADMIN — SODIUM CHLORIDE 250 MG: 9 INJECTION, SOLUTION INTRAVENOUS at 09:28

## 2024-04-05 RX ADMIN — DILTIAZEM HYDROCHLORIDE 10 MG: 5 INJECTION INTRAVENOUS at 07:53

## 2024-04-05 RX ADMIN — CILOSTAZOL 100 MG: 100 TABLET ORAL at 21:06

## 2024-04-05 RX ADMIN — ENOXAPARIN SODIUM 100 MG: 100 INJECTION SUBCUTANEOUS at 05:47

## 2024-04-05 RX ADMIN — CILOSTAZOL 100 MG: 100 TABLET ORAL at 08:29

## 2024-04-05 RX ADMIN — ATORVASTATIN CALCIUM 20 MG: 10 TABLET, FILM COATED ORAL at 21:07

## 2024-04-05 RX ADMIN — PANTOPRAZOLE SODIUM 40 MG: 40 TABLET, DELAYED RELEASE ORAL at 05:47

## 2024-04-05 NOTE — PLAN OF CARE
Goal Outcome Evaluation:  Plan of Care Reviewed With: patient        Progress: improving    Outcome Evaluation: VSS, A&Ox4, up ad mark, Pt states she feels better since recieving IV iron. No c/o pain or discomfort. Telemetry sinus rhythm to sinus tack

## 2024-04-05 NOTE — CASE MANAGEMENT/SOCIAL WORK
Discharge Planning Assessment  [facility]     Patient Name: Tabitha Gonzalez  MRN: [id number]  Today's Date: 4/5/2024    Admit Date: 4/3/2024        Discharge Needs Assessment       Row Name 04/05/24 1151       Living Environment    People in Home spouse    Name(s) of People in Home Soy    Current Living Arrangements home    Primary Care Provided by self    Provides Primary Care For no one    Family Caregiver if Needed spouse    Family Caregiver Names Soy    Able to Return to Prior Arrangements yes       Resource/Environmental Concerns    Resource/Environmental Concerns none       Transition Planning    Patient/Family Anticipates Transition to home with family    Transportation Anticipated family or friend will provide       Discharge Needs Assessment    Readmission Within the Last 30 Days no previous admission in last 30 days    Equipment Currently Used at Home none    Concerns to be Addressed no discharge needs identified    Equipment Needed After Discharge none    Discharge Coordination/Progress spoke to patient who lives with spouse and is independent at home prior to illness; has RX coverage and PCP; will follow for DC needs                   Discharge Plan    No documentation.                 Continued Care and Services - Admitted Since 4/3/2024    No active coordination exists for this encounter.          Demographic Summary    No documentation.                  Functional Status    No documentation.                  Psychosocial    No documentation.                  Abuse/Neglect    No documentation.                  Legal    No documentation.                  Substance Abuse    No documentation.                  Patient Forms    No documentation.                     Kimberly Vaca, SHILA

## 2024-04-05 NOTE — CONSULTS
Patient was seen day before yesterday by cardiology  There is a inpatient consult and  See today's progress note

## 2024-04-05 NOTE — PROGRESS NOTES
LOS: 0 days   Patient Care Team:  Nikolai Treadwell MD as PCP - General  Nikolai Treadwell MD as PCP - Family Medicine  Vamshi Harris MD as Consulting Physician (Pulmonary Disease)    Chief Complaint: Palpitations, chest pain and shortness of breath     Subjective    Tabitha Gonzalez is a 64 y.o. female who is being seen in follow-up  Cardiology was consulted 2 days ago was seen by midlevel practitioner  Currently has had intermittent palpitations  When I saw the patient she went into atrial fibrillation with rapid ventricular response  At that time had chest pain  Subsequently with resolution of A-fib when she spontaneously converted to sinus rhythm her chest pain resolved  Has had weakness  Has chronic back pain and says exacerbated by poor mattress in the hospital  Chronically anemic  Has had heartburns  Patient very tearful when I saw her at approximately 7 AM    Telemetry: Paroxysmal atrial fibrillation    Review of Systems   Constitutional: No chills   Has fatigue   No fever.   HENT: Negative.    Eyes: Negative.    Respiratory: Negative for cough,   No chest wall soreness,   Shortness of breath,   no wheezing, no stridor.    Cardiovascular: As above  Gastrointestinal: Negative for abdominal distention,  No abdominal pain,   No blood in stool,   No constipation,   No diarrhea,   No nausea   No vomiting.   Endocrine: Negative.    Genitourinary: Negative for difficulty urinating, dysuria, flank pain and hematuria.   Musculoskeletal: Negative.    Skin: Negative for rash and wound.   Allergic/Immunologic: Negative.    Neurological: Negative for dizziness, syncope, weakness,   No light-headedness  No  headaches.   Hematological: Does not bruise/bleed easily.   Psychiatric/Behavioral: Negative for agitation or behavioral problems,   No confusion,   the patient is  nervous/anxious.       History:   Past Medical History:   Diagnosis Date    AVM (arteriovenous malformation) of colon     Colon polyp      COPD (chronic obstructive pulmonary disease)     DVT (deep venous thrombosis)     Family history of colon cancer     Family history of colonic polyps     GERD (gastroesophageal reflux disease)     History of adenomatous polyp of colon     History of transfusion     after vascular surgery    Hypercholesterolemia     Hypertension     IBS (irritable bowel syndrome)     Migraine     Peripheral artery disease     PONV (postoperative nausea and vomiting)     PVD (peripheral vascular disease)      Past Surgical History:   Procedure Laterality Date    AORTA FEMORAL BYPASS      APPENDECTOMY      CHOLECYSTECTOMY      COLONOSCOPY  10/28/2011    COLONOSCOPY N/A 6/19/2017    Procedure: COLONOSCOPY WITH ANESTHESIA;  Surgeon: Zack Do MD;  Location: [facility] ENDOSCOPY;  Service:     COLONOSCOPY N/A 12/3/2020    Procedure: COLONOSCOPY WITH ANESTHESIA;  Surgeon: Zack Do MD;  Location: [facility] ENDOSCOPY;  Service: Gastroenterology;  Laterality: N/A;  pre hx adenomatous colon polyp, family hx colon ca  post polyps  Nikolai Treadwell MD    ENDOSCOPY  01/26/2007    ENDOSCOPY N/A 6/19/2017    Procedure: ESOPHAGOGASTRODUODENOSCOPY WITH ANESTHESIA;  Surgeon: Zack Do MD;  Location: [facility] ENDOSCOPY;  Service:     HYSTERECTOMY      PERIPHERAL ARTERIAL STENT GRAFT       Social History     Socioeconomic History    Marital status:    Tobacco Use    Smoking status: Former     Current packs/day: 0.00     Types: Cigarettes     Quit date: 2014     Years since quitting: 10.2    Smokeless tobacco: Never   Vaping Use    Vaping status: Former   Substance and Sexual Activity    Alcohol use: Yes     Comment: Rarely     Drug use: No    Sexual activity: Defer     Family History   Problem Relation Age of Onset    Coronary artery disease Mother     Cancer Father         lung    Rectal cancer Sister         In her 50's    Colon cancer Sister         rectal cancer in her 50 's    Stomach cancer Brother     Esophageal cancer  Neg Hx     Liver cancer Neg Hx     Liver disease Neg Hx        Labs:  WBC WBC   Date Value Ref Range Status   04/05/2024 7.44 3.40 - 10.80 10*3/mm3 Final   04/04/2024 7.26 3.40 - 10.80 10*3/mm3 Final   04/03/2024 8.91 3.40 - 10.80 10*3/mm3 Final      HGB Hemoglobin   Date Value Ref Range Status   04/05/2024 9.0 (L) 12.0 - 15.9 g/dL Final   04/04/2024 8.9 (L) 12.0 - 15.9 g/dL Final   04/03/2024 8.9 (L) 12.0 - 15.9 g/dL Final      HCT Hematocrit   Date Value Ref Range Status   04/05/2024 30.4 (L) 34.0 - 46.6 % Final   04/04/2024 29.8 (L) 34.0 - 46.6 % Final   04/03/2024 30.6 (L) 34.0 - 46.6 % Final      Platelets Platelets   Date Value Ref Range Status   04/05/2024 323 140 - 450 10*3/mm3 Final   04/04/2024 325 140 - 450 10*3/mm3 Final   04/03/2024 313 140 - 450 10*3/mm3 Final      MCV MCV   Date Value Ref Range Status   04/05/2024 74.1 (L) 79.0 - 97.0 fL Final   04/04/2024 73.9 (L) 79.0 - 97.0 fL Final   04/03/2024 75.0 (L) 79.0 - 97.0 fL Final        Results from last 7 days   Lab Units 04/05/24  0500 04/04/24  0600 04/03/24  1108   SODIUM mmol/L 142 141 142   POTASSIUM mmol/L 3.9 4.0 3.8   CHLORIDE mmol/L 111* 110* 108*   CO2 mmol/L 21.0* 24.0 22.0   BUN mg/dL 13 12 8   CREATININE mg/dL 0.97 1.09* 1.02*   CALCIUM mg/dL 8.7 8.9 9.2   BILIRUBIN mg/dL 0.2 0.3 0.3   ALK PHOS U/L 115 119* 126*   ALT (SGPT) U/L 12 13 13   AST (SGOT) U/L 12 14 15   GLUCOSE mg/dL 120* 115* 100*     Lab Results   Component Value Date    TROPONINT 9 04/03/2024     PT/INR:    Protime   Date Value Ref Range Status   04/03/2024 19.8 (H) 11.8 - 14.8 Seconds Final   /  INR   Date Value Ref Range Status   04/03/2024 1.61 (H) 0.91 - 1.09 Final       Imaging Results (Last 72 Hours)       Procedure Component Value Units Date/Time    NM Lung Scan Perfusion Particulate [[id number]] Collected: 04/03/24 1422     Updated: 04/03/24 1428    Narrative:      EXAMINATION: NM LUNG SCAN PERFUSION PARTICULATE- 4/3/2024 2:22 PM     HISTORY: Chest pressure, weakness.  Clinical concern for pulmonary  embolism.     Dose: 5.7 mCi technetium 99m MAA intravenously.     COMPARISON: Chest x-rays 4/3/2024.     REPORT: After administration of the radiotracer, perfusion only  scintigraphic images of the lungs were obtained in the anterior,  posterior and oblique dimensions.     Distribution of activity within the lungs is homogeneous and normal.       Impression:      Low probability perfusion only lung scintigraphy.     This report was signed and finalized on 4/3/2024 2:25 PM by Dr. Nikolai Mac MD.       XR Chest PA & Lateral [[id number]] Collected: 04/03/24 1216     Updated: 04/03/24 1219    Narrative:      EXAM: XR CHEST PA AND LATERAL- 4/3/2024 11:04 AM     HISTORY: chest pain       COMPARISON: None.     TECHNIQUE: Frontal and lateral radiographs of the chest was obtained.     FINDINGS:      Support Devices: None.     Cardiac and Mediastinal Silhouettes: Normal.     Lungs/Pleura: No focal consolidation. No sizable pleural effusion. No  visible pneumothorax.     Osseous structures: No acute osseous finding.     Other: None.       Impression:         No acute cardiopulmonary abnormality.        This report was signed and finalized on 4/3/2024 12:16 PM by Jay Black.               Objective     Allergies   Allergen Reactions    Contrast Dye (Echo Or Unknown Ct/Mr) Shortness Of Breath and Swelling    Azithromycin Rash    Codeine Hives and Itching    Penicillin G Sodium Rash    Sulfa Antibiotics Hives and Itching       Medication Review: Performed  Current Facility-Administered Medications   Medication Dose Route Frequency Provider Last Rate Last Admin    acetaminophen (TYLENOL) tablet 650 mg  650 mg Oral Q6H PRN Glen Tapia MD   650 mg at 04/03/24 1536    atorvastatin (LIPITOR) tablet 20 mg  20 mg Oral Nightly Julissa Mcclani PA   20 mg at 04/04/24 2004    sennosides-docusate (PERICOLACE) 8.6-50 MG per tablet 2 tablet  2 tablet Oral BID PRN Julissa Mcclain  PA        And    polyethylene glycol (MIRALAX) packet 17 g  17 g Oral Daily PRN Julissa Mcclain PA        And    bisacodyl (DULCOLAX) EC tablet 5 mg  5 mg Oral Daily PRN Julissa Mcclain PA        And    bisacodyl (DULCOLAX) suppository 10 mg  10 mg Rectal Daily PRN Julissa Mcclain PA        calcium carbonate (TUMS) chewable tablet 500 mg (200 mg elemental)  2 tablet Oral BID PRN Julissa Mcclain PA        Calcium Replacement - Follow Nurse / BPA Driven Protocol   Does not apply PRN Julissa Mcclain PA        cilostazol (PLETAL) tablet 100 mg  100 mg Oral BID Julissa Mcclain PA   100 mg at 04/05/24 0829    dilTIAZem (CARDIZEM) 125 mg in 125 mL D5W infusion  5-15 mg/hr Intravenous Titrated Peña Trivedi MD   Stopped at 04/05/24 0751    Enoxaparin Sodium (LOVENOX) syringe 100 mg  1 mg/kg Subcutaneous Q12H Yaakov Tovar DO   100 mg at 04/05/24 0547    ferric gluconate (FERRLECIT) 250 mg in sodium chloride 0.9 % 250 mL IVPB  250 mg Intravenous Daily Glen Tapia  mL/hr at 04/05/24 0928 250 mg at 04/05/24 0928    folic acid-pyridoxine-cyanocobalamin (FOLBIC) tablet 1 tablet  1 tablet Oral Nightly Yaakov Tovar DO   1 tablet at 04/04/24 2004    Magnesium Standard Dose Replacement - Follow Nurse / BPA Driven Protocol   Does not apply PRN Julissa Mcclain PA        metoprolol succinate XL (TOPROL-XL) 24 hr tablet 50 mg  50 mg Oral Daily Julissa Mcclain PA   50 mg at 04/05/24 0829    nitroglycerin (NITROSTAT) SL tablet 0.4 mg  0.4 mg Sublingual Q5 Min PRN Julissa Mcclain PA        ondansetron ODT (ZOFRAN-ODT) disintegrating tablet 4 mg  4 mg Oral Q6H PRN Julissa Mcclain PA        pantoprazole (PROTONIX) EC tablet 40 mg  40 mg Oral Q AM Julissa Mcclain PA   40 mg at 04/05/24 0547    Phosphorus Replacement - Follow Nurse / BPA Driven Protocol   Does not apply PRN Julissa Mcclain PA        Potassium  "Replacement - Follow Nurse / BPA Driven Protocol   Does not apply PRN Julissa Mcclain PA        [Held by provider] rivaroxaban (XARELTO) tablet 20 mg  20 mg Oral Daily Julissa Mcclain PA        sodium chloride 0.9 % flush 10 mL  10 mL Intravenous Q12H Julissa Mcclain PA   10 mL at 04/05/24 0829    sodium chloride 0.9 % flush 10 mL  10 mL Intravenous PRN Julissa Mcclain PA        sodium chloride 0.9 % infusion 40 mL  40 mL Intravenous PRN Julissa Mcclain PA           Vital Sign Min/Max for last 24 hours  Temp  Min: 97.3 °F (36.3 °C)  Max: 98.2 °F (36.8 °C)   BP  Min: 121/47  Max: 148/58   Pulse  Min: 81  Max: 88   Resp  Min: 16  Max: 16   SpO2  Min: 93 %  Max: 96 %   No data recorded   Weight  Min: 101 kg (223 lb)  Max: 101 kg (223 lb)     Flowsheet Rows      Flowsheet Row First Filed Value   Admission Height 175.3 cm (69\") Documented at 04/03/2024 1050   Admission Weight 102 kg (223 lb 12.8 oz) Documented at 04/03/2024 1050            Results for orders placed during the hospital encounter of 04/03/24    Adult Transthoracic Echo Limited W/ Cont if Necessary Per Protocol    Interpretation Summary    Left ventricular systolic function is hyperdynamic (EF > 70%). Left ventricular ejection fraction appears to be greater than 70%.    Normal right ventricular cavity size and systolic function noted.    There is mild calcification of the aortic valve. There is mild thickening of the aortic valve. No aortic valve regurgitation is present. Mild aortic valve stenosis is present.    The mitral valve is structurally normal with no regurgitation or significant stenosis present.    No evidence of pulmonary hypertension is present.      Physical Exam:    General Appearance: Awake, alert, in no acute distress  Eyes: Pupils equal and reactive    Ears: Appear intact with no abnormalities noted  Nose: Nares normal, no drainage  Neck: supple, trachea midline, no carotid bruit and no JVD  Back: no " kyphosis present,    Lungs: respirations regular, respirations even and respirations unlabored  Heart: normal S1, S2, no significant murmurs   No gallops or rubs  no rub and no click  Abdomen: normal bowel sounds, no tenderness   Skin: no bleeding, bruising or rash  Extremities: no cyanosis  Psychiatric/Behavioral: Negative for agitation, behavioral problems, confusion, the patient does  appear to be nervous/anxious.       Results Review:   I reviewed the patient's new clinical results.  I reviewed the patient's new imaging results and agree with the interpretation.  I reviewed the patient's other test results and agree with the interpretation  I personally viewed and interpreted the patient's EKG/Telemetry data    Discussed with patient  Updated patient regarding any new or relevant abnormalities on review of records or any new findings on physical exam.   Mentioned to patient about purpose of visit and desirable health short and long term goals and objectives.     Reviewed available prior notes, consults, prior visits, laboratory findings, radiology and cardiology relevant reports.   Updated chart as applicable.   I have reviewed the patient's medical history in detail and updated the computerized patient record as relevant.          Assessment & Plan       Chest discomfort    PVD (peripheral vascular disease) with claudication    Anemia    Palpitations    Chest pain  Paroxysmal atrial fibrillation with rapid ventricular response    Plan    Will get GI consultation given significant anemia as well as hematology consultation  Needs further evaluation and treatment of anemia  For now continue on beta-blocker therapy  Add Multaq 400 mg p.o. twice daily  Will require ischemia workup in future and can have a nuclear perfusion scan  Results of echocardiogram reviewed which shows hyperdynamic left ventricle with mild aortic stenosis  Telemetry  Deep vein thrombosis prophylaxis/precautions [on  anticoagulation]  Appropriate diet, fluid, sodium, caffeine, stimulants intake   Questions were encouraged, asked and answered to the patient's  understanding and satisfaction.  Compliance to diet and medications       Peña Trivedi MD  04/05/24  09:56 CDT    EMR Dragon/Transcription was used to dictate part of this note

## 2024-04-05 NOTE — CONSULTS
[facility]  Inpatient Consult Note  Today's date:  04/05/24    Tabitha Gonzalez  1960       Referring Provider: Nikolai Treadwell MD  Primary Physician: Nikolai Treadwell MD   Primary Gastroenterologist: Unknown    Date of Admission: 4/3/2024  Date of Service:  04/05/24    Reason for Consultation/Chief Complaint: Anemia    History of present illness: 64-year-old patient who was admitted the hospital for cardiac evaluation for chest pain and with atrial fibrillation with rapid ventricular response.  She was on Xarelto and Plavix up until 1 to 2 days ago and both are being held at the present time.  The patient is presently on Lovenox.  Are asked to see her for anemia with a stable hemoglobin of 9.0.  Her last hemoglobin in January 26 of this year was 11.7.  She did have an upper endoscopy in June 2017 which was normal by report and a colonoscopy in December 2020 showing small 4 small polyps which were not malignant.  She apparently was notified recently to have surveillance colonoscopy by the GI office and her local gastroenterologist.  She denies hematemesis, melena, hematochezia, abdominal pain, dysphagia, odynophagia, nausea, vomiting, diarrhea or constipation, abdominal pain, skin rashes or oral ulcerations.    Past Medical History:   Diagnosis Date    AVM (arteriovenous malformation) of colon     Colon polyp     COPD (chronic obstructive pulmonary disease)     DVT (deep venous thrombosis)     Family history of colon cancer     Family history of colonic polyps     GERD (gastroesophageal reflux disease)     History of adenomatous polyp of colon     History of transfusion     after vascular surgery    Hypercholesterolemia     Hypertension     IBS (irritable bowel syndrome)     Migraine     Peripheral artery disease     PONV (postoperative nausea and vomiting)     PVD (peripheral vascular disease)        Past Surgical History:   Procedure Laterality Date    AORTA FEMORAL BYPASS       APPENDECTOMY      CHOLECYSTECTOMY      COLONOSCOPY  10/28/2011    COLONOSCOPY N/A 6/19/2017    Procedure: COLONOSCOPY WITH ANESTHESIA;  Surgeon: Zack Do MD;  Location: [facility] ENDOSCOPY;  Service:     COLONOSCOPY N/A 12/3/2020    Procedure: COLONOSCOPY WITH ANESTHESIA;  Surgeon: Zack Do MD;  Location: [facility] ENDOSCOPY;  Service: Gastroenterology;  Laterality: N/A;  pre hx adenomatous colon polyp, family hx colon ca  post polyps  Nikolai Treadwell MD    ENDOSCOPY  01/26/2007    ENDOSCOPY N/A 6/19/2017    Procedure: ESOPHAGOGASTRODUODENOSCOPY WITH ANESTHESIA;  Surgeon: Zack Do MD;  Location: [facility] ENDOSCOPY;  Service:     HYSTERECTOMY      PERIPHERAL ARTERIAL STENT GRAFT          Allergies   Allergen Reactions    Contrast Dye (Echo Or Unknown Ct/Mr) Shortness Of Breath and Swelling    Azithromycin Rash    Codeine Hives and Itching    Penicillin G Sodium Rash    Sulfa Antibiotics Hives and Itching       Medications Prior to Admission   Medication Sig Dispense Refill Last Dose    atorvastatin (LIPITOR) 20 MG tablet Take 1 tablet by mouth Daily.       cilostazol (PLETAL) 100 MG tablet Take 1 tablet by mouth 2 (Two) Times a Day.       Folbic 2.5-25-2 MG tablet tablet Take 1 tablet by mouth Daily.       metoprolol succinate XL (TOPROL-XL) 50 MG 24 hr tablet Take 1 tablet by mouth Daily.       rivaroxaban (XARELTO) 20 MG tablet Take 1 tablet by mouth Daily.          Hospital Medications (active)         Dose Frequency Start End    acetaminophen (TYLENOL) tablet 650 mg 650 mg Every 6 Hours PRN 4/3/2024 --    Admin Instructions: If given for fever, use fever parameter: fever greater than 100.4 °F  Based on patient request - if ordered for moderate or severe pain, provider allows for administration of a medication prescribed for a lower pain scale.    Do not exceed 4 grams of acetaminophen in a 24 hr period. Max dose of 2gm for AST/ALT greater than 120 units/L.    If given for pain, use the  "following pain scale:   Mild Pain = Pain Score of 1-3, CPOT 1-2  Moderate Pain = Pain Score of 4-6, CPOT 3-4  Severe Pain = Pain Score of 7-10, CPOT 5-8    Route: Oral    atorvastatin (LIPITOR) tablet 20 mg 20 mg Nightly 4/3/2024 --    Admin Instructions: Avoid grapefruit juice.    Route: Oral    Cosign for Ordering: Accepted by Glen Tapia MD on 4/3/2024  1:09 PM    bisacodyl (DULCOLAX) EC tablet 5 mg 5 mg Daily PRN 4/3/2024 --    Admin Instructions: Use if no bowel movement after 12 hours.  Swallow whole. Do not crush, split, or chew tablet.    Route: Oral    Cosign for Ordering: Accepted by Glen Tapia MD on 4/3/2024  1:09 PM    Linked Group 1: Placed in \"And\" Linked Group        bisacodyl (DULCOLAX) suppository 10 mg 10 mg Daily PRN 4/3/2024 --    Admin Instructions: Use if no bowel movement after 12 hours.  Hold for diarrhea    Route: Rectal    Cosign for Ordering: Accepted by Glen Tapia MD on 4/3/2024  1:09 PM    Linked Group 1: Placed in \"And\" Linked Group        calcium carbonate (TUMS) chewable tablet 500 mg (200 mg elemental) 2 tablet 2 Times Daily PRN 4/3/2024 --    Admin Instructions: One tablet contains 200 mg elemental calcium.  Take with food.    Route: Oral    Cosign for Ordering: Accepted by Glen Tapia MD on 4/3/2024  1:09 PM    Calcium Replacement - Follow Nurse / BPA Driven Protocol  As Needed 4/3/2024 --    Admin Instructions: Open Order & Select \"[facility]HS Electrolyte Replacement Protocol Algorithm\" to View Details    Route: Does not apply    Cosign for Ordering: Accepted by Glen Tapia MD on 4/3/2024  1:09 PM    cilostazol (PLETAL) tablet 100 mg 100 mg 2 Times Daily 4/3/2024 --    Admin Instructions: Give on empty stomach 30 minutes prior to meal(s).  Avoid grapefruit juice.    Route: Oral    Cosign for Ordering: Accepted by Glen Tapia MD on 4/3/2024  1:09 PM    dilTIAZem (CARDIZEM) 125 mg in 125 mL D5W infusion 5-15 mg/hr Titrated 4/5/2024 --    Admin " "Instructions: For heart rate - To maintain HR less than 120, initiate infusion at 5 mg/hr. Titrate up or down 2.5-5 mg/hr every 15 minutes to use the lowest dose possible. Maximum infusion rate of 15 mg/hr. Hold for SBP less than 100 mmHg or heart rate less than 60. Contact provider if unable to maintain HR less than 120 on maximum dose. Once Heart Rate target achieved obtain vitals a minimum of every 30 minutes. For patients not in critical care areas - obtain vitals a minimum of every 4 hours.   Caution: Look alike/sound alike drug alert.   Refrigerate.    Route: Intravenous    Enoxaparin Sodium (LOVENOX) syringe 100 mg 1 mg/kg × 102 kg Every 12 Hours Scheduled 4/4/2024 4/8/2024    Admin Instructions: Give subcutaneous in abdomen only. Do not massage site after injection.    Route: Subcutaneous    ferric gluconate (FERRLECIT) 250 mg in sodium chloride 0.9 % 250 mL IVPB 250 mg Daily 4/4/2024 4/7/2024    Admin Instructions: Not to exceed 2.1 mg/min    Route: Intravenous    folic acid-pyridoxine-cyanocobalamin (FOLBIC) tablet 1 tablet 1 tablet Nightly 4/3/2024 --    Route: Oral    Magnesium Standard Dose Replacement - Follow Nurse / BPA Driven Protocol  As Needed 4/3/2024 --    Admin Instructions: Open Order & Select \"BHS Electrolyte Replacement Protocol Algorithm\" to View Details    Route: Does not apply    Cosign for Ordering: Accepted by Glen Tapia MD on 4/3/2024  1:09 PM    metoprolol succinate XL (TOPROL-XL) 24 hr tablet 50 mg 50 mg Daily 4/3/2024 --    Admin Instructions: Hold for SBP less than 100, DBP less than 60, or heart rate less than 50    Do not crush or chew the capsules or tablets. The drug may not work as designed if the capsule or tablet is crushed or chewed. Swallow whole.  Do not crush or chew.    Route: Oral    Cosign for Ordering: Accepted by Glen Tapia MD on 4/3/2024  1:09 PM    nitroglycerin (NITROSTAT) SL tablet 0.4 mg 0.4 mg Every 5 Minutes PRN 4/3/2024 --    Admin " "Instructions: If Pain Unrelieved After 3 Doses Notify MD  May administer up to 3 doses per episode.    Route: Sublingual    Cosign for Ordering: Accepted by Glen Tapia MD on 4/3/2024  1:09 PM    ondansetron ODT (ZOFRAN-ODT) disintegrating tablet 4 mg 4 mg Every 6 Hours PRN 4/3/2024 --    Admin Instructions: If BOTH ondansetron (ZOFRAN) and promethazine (PHENERGAN) are ordered use ondansetron first and THEN promethazine IF ondansetron is ineffective.  Place on tongue and allow to dissolve.    Route: Oral    Cosign for Ordering: Accepted by Glen Tapia MD on 4/3/2024  1:09 PM    pantoprazole (PROTONIX) EC tablet 40 mg 40 mg Every Early Morning 4/3/2024 --    Admin Instructions: Swallow whole; do not crush, split, or chew.    Route: Oral    Cosign for Ordering: Accepted by Glen Tapia MD on 4/3/2024  1:09 PM    Phosphorus Replacement - Follow Nurse / BPA Driven Protocol  As Needed 4/3/2024 --    Admin Instructions: Open Order & Select \"BHS Electrolyte Replacement Protocol Algorithm\" to View Details    Route: Does not apply    Cosign for Ordering: Accepted by Glen Tapia MD on 4/3/2024  1:09 PM    polyethylene glycol (MIRALAX) packet 17 g 17 g Daily PRN 4/3/2024 --    Admin Instructions: Use if no bowel movement after 12 hours. Mix in 6-8 ounces of water.  Use 4-8 ounces of water, tea, or juice for each 17 gram dose.    Route: Oral    Cosign for Ordering: Accepted by Glen Tapia MD on 4/3/2024  1:09 PM    Linked Group 1: Placed in \"And\" Linked Group        Potassium Replacement - Follow Nurse / BPA Driven Protocol  As Needed 4/3/2024 --    Admin Instructions: Open Order & Select \"BHS Electrolyte Replacement Protocol Algorithm\" to View Details    Route: Does not apply    Cosign for Ordering: Accepted by Glen Tapia MD on 4/3/2024  1:09 PM    rivaroxaban (XARELTO) tablet 20 mg ([Held by provider] since 4/3/2024  9:40 AM) 20 mg Daily 4/3/2024 --    Admin Instructions: If giving by " "tube: suspend in 50mL water, administer, and immediately follow with enteral feeding.  Give with food.    Route: Oral    sennosides-docusate (PERICOLACE) 8.6-50 MG per tablet 2 tablet 2 tablet 2 Times Daily PRN 4/3/2024 --    Admin Instructions: Start bowel management regimen if patient has not had a bowel movement after 12 hours.    Route: Oral    Cosign for Ordering: Accepted by Glen Tapia MD on 4/3/2024  1:09 PM    Linked Group 1: Placed in \"And\" Linked Group        sodium chloride 0.9 % flush 10 mL 10 mL Every 12 Hours Scheduled 4/3/2024 --    Route: Intravenous    Cosign for Ordering: Accepted by Glen Tapia MD on 4/3/2024  1:09 PM    sodium chloride 0.9 % flush 10 mL 10 mL As Needed 4/3/2024 --    Route: Intravenous    Cosign for Ordering: Accepted by Glen Tapia MD on 4/3/2024  1:09 PM    sodium chloride 0.9 % infusion 40 mL 40 mL As Needed 4/3/2024 --    Admin Instructions: Following administration of an IV intermittent medication, flush line with 40mL NS at 100mL/hr.    Route: Intravenous    Cosign for Ordering: Accepted by Glen Tapia MD on 4/3/2024  1:09 PM            Social History     Tobacco Use    Smoking status: Former     Current packs/day: 0.00     Types: Cigarettes     Quit date: 2014     Years since quitting: 10.2    Smokeless tobacco: Never   Substance Use Topics    Alcohol use: Yes     Comment: Rarely         Past Family History:  Family History   Problem Relation Age of Onset    Coronary artery disease Mother     Cancer Father         lung    Rectal cancer Sister         In her 50's    Colon cancer Sister         rectal cancer in her 50 's    Stomach cancer Brother     Esophageal cancer Neg Hx     Liver cancer Neg Hx     Liver disease Neg Hx        Review of Systems:  Constitutional: No unexpected weight change, no fatigue, no unexplained fever, no sweats or chills.   HEENT: No icteric sclera.  No hearing or visual deficits.  No sore throat.  No chronic nasal " discharge.  Pulmonary: No chronic cough.  No hemoptysis.  No shortness of breath.  Cardiovascular: Positive chest pain.  No palpitations.  No shortness of breath.  Gastrointestinal: As above.  Musculoskeletal/extremities: No peripheral edema.  No cyanosis.  No claudications.  No back pain.  Genitourinary: No dysuria.  No blood in stool.  No urethral discharges.  Neurologic: No seizures.  No headaches.  No dizziness.  No gait problems.  Skin: No rash.  No icterus.  Mental: No psychosis.  No confusions.  No hallucinations.      Physical Exam:  Temp:  [97.3 °F (36.3 °C)-98.2 °F (36.8 °C)] 97.7 °F (36.5 °C)  Heart Rate:  [] 107  Resp:  [16] 16  BP: (121-148)/(47-82) 142/66  Body mass index is 32.93 kg/m².    Intake/Output Summary (Last 24 hours) at 4/5/2024 1210  Last data filed at 4/5/2024 0912  Gross per 24 hour   Intake 840 ml   Output 525 ml   Net 315 ml     I/O this shift:  In: 120 [P.O.:120]  Out: 225 [Urine:225]    General appearance:   HEENT: Nonicteric sclerae.  Moist oral mucosa.  PERRLA.  EOMI.  Clear pharynx.  Lungs: Clear to auscultation bilaterally.  No wheezing, rales or rhonchi.  Heart: Regular rate and rhythm.  Normal S1 and S2, no S3, S4 or murmur.  Abdomen: Soft, nondistended, nontender to palpation, with normoactive bowel sounds, no hepatosplenomegaly, no palpable masses.  Extremities: No cyanosis, edema or pulse deficits.  Skin: No rash or jaundice.  Neurologic: Alert and oriented to person place and time.  Moving all 4 extremities.  Results Review:  Lab Results (last 24 hours)       Procedure Component Value Units Date/Time    Manual Differential [[id number]]  (Abnormal) Collected: 04/05/24 0500    Specimen: Blood Updated: 04/05/24 0614     Neutrophil % 78.4 %      Lymphocyte % 15.5 %      Monocyte % 1.0 %      Eosinophil % 3.1 %      Atypical Lymphocyte % 2.1 %      Neutrophils Absolute 5.83 10*3/mm3      Lymphocytes Absolute 1.31 10*3/mm3      Monocytes Absolute 0.07 10*3/mm3       Eosinophils Absolute 0.23 10*3/mm3      Anisocytosis Slight/1+     Crenated RBC's Slight/1+     Microcytes Slight/1+     Ovalocytes Slight/1+     Poikilocytes Slight/1+     Polychromasia Slight/1+     WBC Morphology Normal     Platelet Morphology Normal    Comprehensive Metabolic Panel [[id number]]  (Abnormal) Collected: 04/05/24 0500    Specimen: Blood Updated: 04/05/24 0609     Glucose 120 mg/dL      BUN 13 mg/dL      Creatinine 0.97 mg/dL      Sodium 142 mmol/L      Potassium 3.9 mmol/L      Chloride 111 mmol/L      CO2 21.0 mmol/L      Calcium 8.7 mg/dL      Total Protein 6.1 g/dL      Albumin 3.5 g/dL      ALT (SGPT) 12 U/L      AST (SGOT) 12 U/L      Alkaline Phosphatase 115 U/L      Total Bilirubin 0.2 mg/dL      Globulin 2.6 gm/dL      A/G Ratio 1.3 g/dL      BUN/Creatinine Ratio 13.4     Anion Gap 10.0 mmol/L      eGFR 64.6 mL/min/1.73     Narrative:      GFR Normal >60  Chronic Kidney Disease <60  Kidney Failure <15      CBC & Differential [[id number]]  (Abnormal) Collected: 04/05/24 0500    Specimen: Blood Updated: 04/05/24 0552    Narrative:      The following orders were created for panel order CBC & Differential.  Procedure                               Abnormality         Status                     ---------                               -----------         ------                     CBC Auto Differential[[id number]]        Abnormal            Final result                 Please view results for these tests on the individual orders.    CBC Auto Differential [[id number]]  (Abnormal) Collected: 04/05/24 0500    Specimen: Blood Updated: 04/05/24 0552     WBC 7.44 10*3/mm3      RBC 4.10 10*6/mm3      Hemoglobin 9.0 g/dL      Hematocrit 30.4 %      MCV 74.1 fL      MCH 22.0 pg      MCHC 29.6 g/dL      RDW 14.5 %      RDW-SD 38.9 fl      MPV 11.0 fL      Platelets 323 10*3/mm3      nRBC 0.0 /100 WBC             Radiology Review:  Imaging Results (Last 72 Hours)       Procedure Component Value Units Date/Time     NM Lung Scan Perfusion Particulate [[id number]] Collected: 04/03/24 1422     Updated: 04/03/24 1428    Narrative:      EXAMINATION: NM LUNG SCAN PERFUSION PARTICULATE- 4/3/2024 2:22 PM     HISTORY: Chest pressure, weakness. Clinical concern for pulmonary  embolism.     Dose: 5.7 mCi technetium 99m MAA intravenously.     COMPARISON: Chest x-rays 4/3/2024.     REPORT: After administration of the radiotracer, perfusion only  scintigraphic images of the lungs were obtained in the anterior,  posterior and oblique dimensions.     Distribution of activity within the lungs is homogeneous and normal.       Impression:      Low probability perfusion only lung scintigraphy.     This report was signed and finalized on 4/3/2024 2:25 PM by Dr. Nikolai Mac MD.       XR Chest PA & Lateral [[id number]] Collected: 04/03/24 1216     Updated: 04/03/24 1219    Narrative:      EXAM: XR CHEST PA AND LATERAL- 4/3/2024 11:04 AM     HISTORY: chest pain       COMPARISON: None.     TECHNIQUE: Frontal and lateral radiographs of the chest was obtained.     FINDINGS:      Support Devices: None.     Cardiac and Mediastinal Silhouettes: Normal.     Lungs/Pleura: No focal consolidation. No sizable pleural effusion. No  visible pneumothorax.     Osseous structures: No acute osseous finding.     Other: None.       Impression:         No acute cardiopulmonary abnormality.        This report was signed and finalized on 4/3/2024 12:16 PM by Jay Black.               Impression/Plan:    Chest discomfort    PVD (peripheral vascular disease) with claudication    Anemia    Palpitations    Chest pain      I have recommended an upper endoscopy and colonoscopy when the patient is stable from a cardiac standpoint and she can hold Plavix and Xarelto for 3 to 5 days.  This can be performed early next week if the patient desires however she has declined an upper endoscopy and colonoscopy as an inpatient and was requesting these to be performed as outpatient  procedures.  She should follow-up with her gastroenterologist within the week after hospital release to schedule an upper endoscopy and colonoscopy.  Recommend serial CBCs and blood transfusions as needed per the primary/referring service.  Avoid aspirin and nonsteroidal anti-inflammatory drugs if possible.  Agree with empiric proton pump inhibitor therapy although no clinical upper GI bleeding noted.  We will not see the patient over the weekend unless requested.  The inpatient GI service remains available should any urgent endoscopic intervention be necessary.  The above was discussed in detail with the patient and the patient's family members at bedside and they appeared to understand and agreed.  Thank you  Edwin Nam MD  04/05/24   12:10 CDT    DISCLAIMER:    This physician works through a [employer] as an inpatient consultant gastroenterologist only and has no outpatient clinic for patient follow up.  Any results not available at time of inpatient discharge and/or GI clinic follow up should be managed by the hospitalist team, PCP, or outpatient gastroenterologist.

## 2024-04-06 LAB
ALBUMIN SERPL-MCNC: 3.6 G/DL (ref 3.5–5.2)
ALBUMIN/GLOB SERPL: 1.4 G/DL
ALP SERPL-CCNC: 114 U/L (ref 39–117)
ALT SERPL W P-5'-P-CCNC: 18 U/L (ref 1–33)
ANION GAP SERPL CALCULATED.3IONS-SCNC: 11 MMOL/L (ref 5–15)
AST SERPL-CCNC: 21 U/L (ref 1–32)
BASOPHILS # BLD AUTO: 0.13 10*3/MM3 (ref 0–0.2)
BASOPHILS NFR BLD AUTO: 1.4 % (ref 0–1.5)
BILIRUB SERPL-MCNC: 0.2 MG/DL (ref 0–1.2)
BUN SERPL-MCNC: 14 MG/DL (ref 8–23)
BUN/CREAT SERPL: 14.1 (ref 7–25)
CALCIUM SPEC-SCNC: 9.2 MG/DL (ref 8.6–10.5)
CHLORIDE SERPL-SCNC: 111 MMOL/L (ref 98–107)
CO2 SERPL-SCNC: 20 MMOL/L (ref 22–29)
CREAT SERPL-MCNC: 0.99 MG/DL (ref 0.57–1)
DEPRECATED RDW RBC AUTO: 39.2 FL (ref 37–54)
EGFRCR SERPLBLD CKD-EPI 2021: 63 ML/MIN/1.73
EOSINOPHIL # BLD AUTO: 0.26 10*3/MM3 (ref 0–0.4)
EOSINOPHIL NFR BLD AUTO: 2.8 % (ref 0.3–6.2)
ERYTHROCYTE [DISTWIDTH] IN BLOOD BY AUTOMATED COUNT: 14.7 % (ref 12.3–15.4)
FOLATE SERPL-MCNC: >20 NG/ML (ref 4.78–24.2)
GLOBULIN UR ELPH-MCNC: 2.6 GM/DL
GLUCOSE SERPL-MCNC: 135 MG/DL (ref 65–99)
HCT VFR BLD AUTO: 30.1 % (ref 34–46.6)
HGB BLD-MCNC: 9 G/DL (ref 12–15.9)
IMM GRANULOCYTES # BLD AUTO: 0.18 10*3/MM3 (ref 0–0.05)
IMM GRANULOCYTES NFR BLD AUTO: 2 % (ref 0–0.5)
LYMPHOCYTES # BLD AUTO: 1.91 10*3/MM3 (ref 0.7–3.1)
LYMPHOCYTES NFR BLD AUTO: 20.8 % (ref 19.6–45.3)
MCH RBC QN AUTO: 22.2 PG (ref 26.6–33)
MCHC RBC AUTO-ENTMCNC: 29.9 G/DL (ref 31.5–35.7)
MCV RBC AUTO: 74.1 FL (ref 79–97)
MONOCYTES # BLD AUTO: 0.84 10*3/MM3 (ref 0.1–0.9)
MONOCYTES NFR BLD AUTO: 9.2 % (ref 5–12)
NEUTROPHILS NFR BLD AUTO: 5.86 10*3/MM3 (ref 1.7–7)
NEUTROPHILS NFR BLD AUTO: 63.8 % (ref 42.7–76)
NRBC BLD AUTO-RTO: 0 /100 WBC (ref 0–0.2)
PLATELET # BLD AUTO: 313 10*3/MM3 (ref 140–450)
PMV BLD AUTO: 10.7 FL (ref 6–12)
POTASSIUM SERPL-SCNC: 4 MMOL/L (ref 3.5–5.2)
PROT SERPL-MCNC: 6.2 G/DL (ref 6–8.5)
RBC # BLD AUTO: 4.06 10*6/MM3 (ref 3.77–5.28)
SODIUM SERPL-SCNC: 142 MMOL/L (ref 136–145)
VIT B12 BLD-MCNC: 1206 PG/ML (ref 211–946)
WBC NRBC COR # BLD AUTO: 9.18 10*3/MM3 (ref 3.4–10.8)

## 2024-04-06 PROCEDURE — 96366 THER/PROPH/DIAG IV INF ADDON: CPT

## 2024-04-06 PROCEDURE — G0378 HOSPITAL OBSERVATION PER HR: HCPCS

## 2024-04-06 PROCEDURE — 80053 COMPREHEN METABOLIC PANEL: CPT | Performed by: FAMILY MEDICINE

## 2024-04-06 PROCEDURE — 25010000002 NA FERRIC GLUC CPLX PER 12.5 MG: Performed by: FAMILY MEDICINE

## 2024-04-06 PROCEDURE — 85025 COMPLETE CBC W/AUTO DIFF WBC: CPT | Performed by: FAMILY MEDICINE

## 2024-04-06 PROCEDURE — 96372 THER/PROPH/DIAG INJ SC/IM: CPT

## 2024-04-06 PROCEDURE — 25010000002 ENOXAPARIN PER 10 MG: Performed by: EMERGENCY MEDICINE

## 2024-04-06 PROCEDURE — 25810000003 SODIUM CHLORIDE 0.9 % SOLUTION 250 ML FLEX CONT: Performed by: FAMILY MEDICINE

## 2024-04-06 PROCEDURE — 82746 ASSAY OF FOLIC ACID SERUM: CPT | Performed by: INTERNAL MEDICINE

## 2024-04-06 PROCEDURE — 82607 VITAMIN B-12: CPT | Performed by: INTERNAL MEDICINE

## 2024-04-06 PROCEDURE — 99214 OFFICE O/P EST MOD 30 MIN: CPT | Performed by: INTERNAL MEDICINE

## 2024-04-06 RX ADMIN — SODIUM CHLORIDE 250 MG: 9 INJECTION, SOLUTION INTRAVENOUS at 08:29

## 2024-04-06 RX ADMIN — FOLIC ACID-PYRIDOXINE-CYANOCOBALAMIN TAB 2.5-25-2 MG 1 TABLET: 2.5-25-2 TAB at 20:56

## 2024-04-06 RX ADMIN — DRONEDARONE 400 MG: 400 TABLET, FILM COATED ORAL at 20:56

## 2024-04-06 RX ADMIN — ENOXAPARIN SODIUM 100 MG: 100 INJECTION SUBCUTANEOUS at 06:10

## 2024-04-06 RX ADMIN — CILOSTAZOL 100 MG: 100 TABLET ORAL at 20:56

## 2024-04-06 RX ADMIN — DRONEDARONE 400 MG: 400 TABLET, FILM COATED ORAL at 08:30

## 2024-04-06 RX ADMIN — CILOSTAZOL 100 MG: 100 TABLET ORAL at 08:26

## 2024-04-06 RX ADMIN — ACETAMINOPHEN 650 MG: 325 TABLET, FILM COATED ORAL at 13:14

## 2024-04-06 RX ADMIN — PANTOPRAZOLE SODIUM 40 MG: 40 TABLET, DELAYED RELEASE ORAL at 06:10

## 2024-04-06 RX ADMIN — ENOXAPARIN SODIUM 100 MG: 100 INJECTION SUBCUTANEOUS at 17:11

## 2024-04-06 RX ADMIN — Medication 10 ML: at 08:27

## 2024-04-06 RX ADMIN — ANTACID TABLETS 2 TABLET: 500 TABLET, CHEWABLE ORAL at 02:04

## 2024-04-06 RX ADMIN — METOPROLOL SUCCINATE 50 MG: 50 TABLET, EXTENDED RELEASE ORAL at 08:26

## 2024-04-06 RX ADMIN — ATORVASTATIN CALCIUM 20 MG: 10 TABLET, FILM COATED ORAL at 20:56

## 2024-04-06 NOTE — CONSULTS
[facility] Oncology/Hematology Services  CONSULT NOTE    PATIENT NAME:  Tabitha Gonzalez  YOB: 1960  PATIENT MRN:  [id number]    Date of Admission:  4/3/2024  Consultation Date:  4/7/2024  Referring Provider: Nikolai Treadwell MD    Subjective     Chief complaint: Chest pressure    Reason for Consultation: Anemia    History of present illness: Tabitha Gonzalez 64 y.o. female with a PMH of colon AVM, COPD, DVT, GERD, HLD, HTN, PAD, who is being hospitalized after presenting with chest pressure.    For chest pressure, it was though to be related to anemia; she also had evidence of iron deficiency, hence she received parenteral iron infusion during her hospitalization. It is of note that the patient is on Eliquis given her history of ? DVT -  upon further questioning for the patient, it seems that about 18 years ago she had lower extremity arterial duplex for which she underwent surgery and was started on anticoagulation at that time.  GI is consulted for GI work-up of iron deficiency. Cardiology is on board for chest pressure evaluation and paroxysmal tachycardia/Afib.    Past Medical History:  Past Medical History:   Diagnosis Date    AVM (arteriovenous malformation) of colon     Colon polyp     COPD (chronic obstructive pulmonary disease)     DVT (deep venous thrombosis)     Family history of colon cancer     Family history of colonic polyps     GERD (gastroesophageal reflux disease)     History of adenomatous polyp of colon     History of transfusion     after vascular surgery    Hypercholesterolemia     Hypertension     IBS (irritable bowel syndrome)     Migraine     Peripheral artery disease     PONV (postoperative nausea and vomiting)     PVD (peripheral vascular disease)      Prior Surgeries:  Past Surgical History:   Procedure Laterality Date    AORTA FEMORAL BYPASS      APPENDECTOMY      CHOLECYSTECTOMY      COLONOSCOPY  10/28/2011    COLONOSCOPY N/A 6/19/2017    Procedure:  COLONOSCOPY WITH ANESTHESIA;  Surgeon: Zack Do MD;  Location: [facility] ENDOSCOPY;  Service:     COLONOSCOPY N/A 12/3/2020    Procedure: COLONOSCOPY WITH ANESTHESIA;  Surgeon: Zack Do MD;  Location: [facility] ENDOSCOPY;  Service: Gastroenterology;  Laterality: N/A;  pre hx adenomatous colon polyp, family hx colon ca  post polyps  Nikolai Treadwell MD    ENDOSCOPY  01/26/2007    ENDOSCOPY N/A 6/19/2017    Procedure: ESOPHAGOGASTRODUODENOSCOPY WITH ANESTHESIA;  Surgeon: Zack Do MD;  Location: [facility] ENDOSCOPY;  Service:     HYSTERECTOMY      PERIPHERAL ARTERIAL STENT GRAFT          Allergies:Contrast dye (echo or unknown ct/mr), Azithromycin, Codeine, Penicillin g sodium, and Sulfa antibiotics  PTA Meds:  Medications Prior to Admission   Medication Sig Dispense Refill Last Dose    atorvastatin (LIPITOR) 20 MG tablet Take 1 tablet by mouth Daily.       cilostazol (PLETAL) 100 MG tablet Take 1 tablet by mouth 2 (Two) Times a Day.       Folbic 2.5-25-2 MG tablet tablet Take 1 tablet by mouth Daily.       metoprolol succinate XL (TOPROL-XL) 50 MG 24 hr tablet Take 1 tablet by mouth Daily.       rivaroxaban (XARELTO) 20 MG tablet Take 1 tablet by mouth Daily.         Current Meds:   Current Facility-Administered Medications   Medication Dose Route Frequency Provider Last Rate Last Admin    acetaminophen (TYLENOL) tablet 650 mg  650 mg Oral Q6H PRN Glen Tapia MD   650 mg at 04/06/24 1314    atorvastatin (LIPITOR) tablet 20 mg  20 mg Oral Nightly Julissa Mcclain PA   20 mg at 04/06/24 2056    sennosides-docusate (PERICOLACE) 8.6-50 MG per tablet 2 tablet  2 tablet Oral BID PRN Julissa Mcclain PA        And    polyethylene glycol (MIRALAX) packet 17 g  17 g Oral Daily PRN Julissa Mcclain PA        And    bisacodyl (DULCOLAX) EC tablet 5 mg  5 mg Oral Daily PRN Julissa Mcclain PA        And    bisacodyl (DULCOLAX) suppository 10 mg  10 mg Rectal Daily PRN  Julissa Mcclain PA        calcium carbonate (TUMS) chewable tablet 500 mg (200 mg elemental)  2 tablet Oral BID PRN Julissa Mcclain PA   2 tablet at 04/06/24 0204    Calcium Replacement - Follow Nurse / BPA Driven Protocol   Does not apply PRN Julissa Mcclain PA        cilostazol (PLETAL) tablet 100 mg  100 mg Oral BID Julissa Mcclain PA   100 mg at 04/07/24 0837    dilTIAZem (CARDIZEM) 125 mg in 125 mL D5W infusion  5-15 mg/hr Intravenous Titrated Peña Trivedi MD        dronedarone (MULTAQ) tablet 400 mg  400 mg Oral Q12H Peña Trivedi MD   400 mg at 04/07/24 0837    Enoxaparin Sodium (LOVENOX) syringe 100 mg  1 mg/kg Subcutaneous Q12H Yaakov Tovar, DO   100 mg at 04/07/24 0610    folic acid-pyridoxine-cyanocobalamin (FOLBIC) tablet 1 tablet  1 tablet Oral Nightly Yaakov Tovar, DO   1 tablet at 04/06/24 2056    Magnesium Standard Dose Replacement - Follow Nurse / BPA Driven Protocol   Does not apply PRN Julissa Mcclain PA        metoprolol succinate XL (TOPROL-XL) 24 hr tablet 50 mg  50 mg Oral Daily Julissa Mcclain PA   50 mg at 04/07/24 0837    nitroglycerin (NITROSTAT) SL tablet 0.4 mg  0.4 mg Sublingual Q5 Min PRN Julissa Mcclain PA        ondansetron ODT (ZOFRAN-ODT) disintegrating tablet 4 mg  4 mg Oral Q6H PRN Julissa Mcclain PA        pantoprazole (PROTONIX) EC tablet 40 mg  40 mg Oral Q AM Julissa Mcclain PA   40 mg at 04/06/24 0610    Phosphorus Replacement - Follow Nurse / BPA Driven Protocol   Does not apply PRN Julissa Mcclain PA        Potassium Replacement - Follow Nurse / BPA Driven Protocol   Does not apply PRN Julissa Mcclain PA        [Held by provider] rivaroxaban (XARELTO) tablet 20 mg  20 mg Oral Daily Julissa Mcclain PA        sodium chloride 0.9 % flush 10 mL  10 mL Intravenous Q12H Julissa Mcclain PA   10 mL at 04/06/24 0827    sodium chloride 0.9 % flush 10 mL   "10 mL Intravenous PRN Julissa Mcclain PA        sodium chloride 0.9 % infusion 40 mL  40 mL Intravenous PRN Julissa Mcclain PA           Family History:family history includes Cancer in her father; Colon cancer in her sister; Coronary artery disease in her mother; Rectal cancer in her sister; Stomach cancer in her brother.   Social History: reports that she quit smoking about 10 years ago. Her smoking use included cigarettes. She has never used smokeless tobacco. She reports current alcohol use. She reports that she does not use drugs.    Review of Systems  As per HPI    Objective      Vital Signs   Temp:  [97.6 °F (36.4 °C)-98.9 °F (37.2 °C)] 97.6 °F (36.4 °C)  Heart Rate:  [81-98] 94  Resp:  [16] 16  BP: (114-145)/(45-74) 145/74    GENERAL: Alert and oriented, no apparent distress  HEENT: No scleral icterus  NECK: Supple  HEART: Regular rate and rhythm  LUNGS: Clear to auscultation bilaterally  ABDOMEN: Soft, nontender, nondistended  EXTREMITIES: Symmetric  SKIN: No jaundice  PSYCHIATRIC: Full affect  NEUROLOGIC: Stable gait    Results from last 7 days   Lab Units 04/07/24  0537 04/06/24  0442 04/05/24  0500   WBC 10*3/mm3 10.23 9.18 7.44   HEMOGLOBIN g/dL 9.2* 9.0* 9.0*   HEMATOCRIT % 30.8* 30.1* 30.4*   PLATELETS 10*3/mm3 311 313 323        Results from last 7 days   Lab Units 04/07/24  0537 04/06/24  0442 04/05/24  0500   SODIUM mmol/L 141 142 142   POTASSIUM mmol/L 4.0 4.0 3.9   CHLORIDE mmol/L 109* 111* 111*   CO2 mmol/L 22.0 20.0* 21.0*   BUN mg/dL 14 14 13   CREATININE mg/dL 0.99 0.99 0.97   CALCIUM mg/dL 9.1 9.2 8.7   BILIRUBIN mg/dL 0.3 0.2 0.2   ALK PHOS U/L 123* 114 115   ALT (SGPT) U/L 28 18 12   AST (SGOT) U/L 34* 21 12   GLUCOSE mg/dL 115* 135* 120*       ABG:  No results found for: \"PHART\", \"PO2ART\", \"XPK2VBS\"    Culture Data:   No results found for: \"BLOODCX\", \"URINECX\", \"WOUNDCX\", \"MRSACX\", \"RESPCX\", \"STOOLCX\"    Radiology:   Imaging Results (Last 7 Days)       Procedure Component " Value Units Date/Time    NM Lung Scan Perfusion Particulate [[id number]] Collected: 04/03/24 1422     Updated: 04/03/24 1428    Narrative:      EXAMINATION: NM LUNG SCAN PERFUSION PARTICULATE- 4/3/2024 2:22 PM     HISTORY: Chest pressure, weakness. Clinical concern for pulmonary  embolism.     Dose: 5.7 mCi technetium 99m MAA intravenously.     COMPARISON: Chest x-rays 4/3/2024.     REPORT: After administration of the radiotracer, perfusion only  scintigraphic images of the lungs were obtained in the anterior,  posterior and oblique dimensions.     Distribution of activity within the lungs is homogeneous and normal.       Impression:      Low probability perfusion only lung scintigraphy.     This report was signed and finalized on 4/3/2024 2:25 PM by Dr. Nikolai Mac MD.       XR Chest PA & Lateral [[id number]] Collected: 04/03/24 1216     Updated: 04/03/24 1219    Narrative:      EXAM: XR CHEST PA AND LATERAL- 4/3/2024 11:04 AM     HISTORY: chest pain       COMPARISON: None.     TECHNIQUE: Frontal and lateral radiographs of the chest was obtained.     FINDINGS:      Support Devices: None.     Cardiac and Mediastinal Silhouettes: Normal.     Lungs/Pleura: No focal consolidation. No sizable pleural effusion. No  visible pneumothorax.     Osseous structures: No acute osseous finding.     Other: None.       Impression:         No acute cardiopulmonary abnormality.        This report was signed and finalized on 4/3/2024 12:16 PM by Jay Black.                Assessment/Plan      ASSESSMENT:  Chest pressure  PVD  Anemia, iron deficiency  Paroxsymal Afib    PLAN:  - The patient's anemia is microcytic with evidence of iron deficiency, hence it is most likely related to occult GI bleed also given her history of known colon AVMs.  - I concur with parenteral iron infusion; the patient did receive multiple during her hospital stay.  - I concur with GI consultation for GI work-up evaluation of iron deficiency; she is  planned to under work-up an an outpatient.  - If the patient has Afib and anticoagulation is recommended per cardiology, this will also serve for her history of questionable DVT - though upon further questioning for the patient, it seems that about 18 years ago she had lower extremity arterial duplex for which she underwent surgery and was started on anticoagulation at that time.  - We will plan to see the patient in the hematology clinic to monitor her CBC and iron studies.    Padmini Rust MD  04/07/24  09:16 CDT

## 2024-04-06 NOTE — DISCHARGE SUMMARY
"  Hospital Discharge Summary    Tabitha Gonzalez  :  1960  MRN:  [id number]    Admit date:  4/3/2024  Discharge date:  2024    Admitting Physician:    Glen Tapia MD    Discharge Diagnoses:      Chest discomfort    PVD (peripheral vascular disease) with claudication    Anemia    Palpitations    Chest pain      Hospital Course:   The patient is a 64 y.o. female who presents with 3 week onset of exertional left sided chest pressure radiating to left arm with associated shortness of breath, dizziness and profound weakness.  Patient without known hx of CAD but does have known hx of mild aortic stenosis per echo 10/2023.  Patient reports symptoms worsening and \"I just feel awful.\" She was directly admitted from our office for cardiac work up.  Updated echo shows hyperdynamic left ventricle with mild aortic stenosis.  She was found to have severely iron deficient anemia and was given 3 days of IV iron.  She had Multaq added per cardiology.  Noelle scan recommended by cardiology.  Performed prior to discharge.  Plans for outpatient EGD and colonoscopy.  She was discharged home in stable condition.    The patient was admitted for the above noted medical/surgical issues. Please see daily progress note for further details concerning their stay. The patient improved throughout their stay and reached maximum medical improvement on the day of discharge. The patient/family agree with the treatment plan as outlined above. All questions concerning their stay were answered prior to discharge. They understand the importance of follow up concerning any abnormal test results.     Physical Exam  Vitals reviewed.   Constitutional:       Appearance: Normal appearance. She is not ill-appearing.   HENT:      Head: Normocephalic and atraumatic.   Eyes:      General:         Right eye: No discharge.         Left eye: No discharge.      Extraocular Movements: Extraocular movements intact.      Conjunctiva/sclera: Conjunctivae " normal.   Cardiovascular:      Rate and Rhythm: Normal rate and regular rhythm.      Pulses: Normal pulses.      Heart sounds: No murmur heard.  Pulmonary:      Effort: Pulmonary effort is normal. No respiratory distress.   Abdominal:      General: Abdomen is flat. Bowel sounds are normal. There is no distension.   Musculoskeletal:      Right lower leg: No edema.      Left lower leg: No edema.   Skin:     Capillary Refill: Capillary refill takes less than 2 seconds.   Neurological:      General: No focal deficit present.      Mental Status: She is alert.   Psychiatric:         Mood and Affect: Mood normal.         Behavior: Behavior normal.           Discharge Medications:         Discharge Medications        New Medications        Instructions Start Date   dronedarone 400 MG tablet  Commonly known as: MULTAQ   400 mg, Oral, Every 12 Hours Scheduled      ferrous gluconate 324 MG tablet  Commonly known as: FERGON   324 mg, Oral, Daily With Breakfast             Continue These Medications        Instructions Start Date   atorvastatin 20 MG tablet  Commonly known as: LIPITOR   20 mg, Oral, Daily      cilostazol 100 MG tablet  Commonly known as: PLETAL   100 mg, Oral, 2 Times Daily      Folbic 2.5-25-2 MG tablet tablet  Generic drug: folic acid-pyridoxine-cyanocobalamin   1 tablet, Oral, Daily      metoprolol succinate XL 50 MG 24 hr tablet  Commonly known as: TOPROL-XL   50 mg, Oral, Daily      rivaroxaban 20 MG tablet  Commonly known as: XARELTO   20 mg, Oral, Daily               Activity: As tolerated    Diet: As prior    Consults: Cardiology, GI, hematology    Significant Diagnostic Studies:    NM Lung Scan Perfusion Particulate    Result Date: 4/3/2024  Low probability perfusion only lung scintigraphy.  This report was signed and finalized on 4/3/2024 2:25 PM by Dr. Nikolai Mac MD.      XR Chest PA & Lateral    Result Date: 4/3/2024   No acute cardiopulmonary abnormality.   This report was signed and finalized  on 4/3/2024 12:16 PM by Jay Black.            Treatments:   As above    Disposition:   Discharge home    Time spent on discharge including discussion with patient/family, SW, and coordination of care.     Follow up with Nikolai Treadwell MD in 1 weeks.    Signed:  Glen Tapia MD   4/7/2024, 09:57 CDT

## 2024-04-06 NOTE — PROGRESS NOTES
LOS: 0 days   Patient Care Team:  Nikolai Treadwell MD as PCP - General  Nikolai Treadwell MD as PCP - Family Medicine  Vamshi Harris MD as Consulting Physician (Pulmonary Disease)    Chief Complaint: Palpitations, chest pain and shortness of breath     Subjective    Tabitha Gonzalez is a 64 y.o. female who is being seen in follow-up  Overall feeling better  Denies any chest pain  No further runs of atrial fibrillation  Much less anxious  Yesterday was tearful today appears to feel improved  No other new issues or events  GI saw the patient yesterday and advised upper GI endoscopy which she has declined   Latest test results as referenced below  Telemetry: Paroxysmal atrial fibrillation yesterday now maintaining sinus rhythm    Review of Systems   Constitutional: No chills   Has fatigue   No fever.   HENT: Negative.    Eyes: Negative.    Respiratory: Negative for cough,   No chest wall soreness,   Shortness of breath,   no wheezing, no stridor.    Cardiovascular: As above  Gastrointestinal: Negative for abdominal distention,  No abdominal pain,   No blood in stool,   No constipation,   No diarrhea,   No nausea   No vomiting.   Endocrine: Negative.    Genitourinary: Negative for difficulty urinating, dysuria, flank pain and hematuria.   Musculoskeletal: Negative.    Skin: Negative for rash and wound.   Allergic/Immunologic: Negative.    Neurological: Negative for dizziness, syncope, weakness,   No light-headedness  No  headaches.   Hematological: Does not bruise/bleed easily.   Psychiatric/Behavioral: Negative for agitation or behavioral problems,   No confusion,   the patient is  nervous/anxious.       History:   Past Medical History:   Diagnosis Date    AVM (arteriovenous malformation) of colon     Colon polyp     COPD (chronic obstructive pulmonary disease)     DVT (deep venous thrombosis)     Family history of colon cancer     Family history of colonic polyps     GERD (gastroesophageal reflux  disease)     History of adenomatous polyp of colon     History of transfusion     after vascular surgery    Hypercholesterolemia     Hypertension     IBS (irritable bowel syndrome)     Migraine     Peripheral artery disease     PONV (postoperative nausea and vomiting)     PVD (peripheral vascular disease)      Past Surgical History:   Procedure Laterality Date    AORTA FEMORAL BYPASS      APPENDECTOMY      CHOLECYSTECTOMY      COLONOSCOPY  10/28/2011    COLONOSCOPY N/A 6/19/2017    Procedure: COLONOSCOPY WITH ANESTHESIA;  Surgeon: Zack Do MD;  Location: [facility] ENDOSCOPY;  Service:     COLONOSCOPY N/A 12/3/2020    Procedure: COLONOSCOPY WITH ANESTHESIA;  Surgeon: Zack Do MD;  Location: [facility] ENDOSCOPY;  Service: Gastroenterology;  Laterality: N/A;  pre hx adenomatous colon polyp, family hx colon ca  post polyps  Nikolai Treadwell MD    ENDOSCOPY  01/26/2007    ENDOSCOPY N/A 6/19/2017    Procedure: ESOPHAGOGASTRODUODENOSCOPY WITH ANESTHESIA;  Surgeon: Zack Do MD;  Location: [facility] ENDOSCOPY;  Service:     HYSTERECTOMY      PERIPHERAL ARTERIAL STENT GRAFT       Social History     Socioeconomic History    Marital status:    Tobacco Use    Smoking status: Former     Current packs/day: 0.00     Types: Cigarettes     Quit date: 2014     Years since quitting: 10.2    Smokeless tobacco: Never   Vaping Use    Vaping status: Former   Substance and Sexual Activity    Alcohol use: Yes     Comment: Rarely     Drug use: No    Sexual activity: Defer     Family History   Problem Relation Age of Onset    Coronary artery disease Mother     Cancer Father         lung    Rectal cancer Sister         In her 50's    Colon cancer Sister         rectal cancer in her 50 's    Stomach cancer Brother     Esophageal cancer Neg Hx     Liver cancer Neg Hx     Liver disease Neg Hx        Labs:  WBC WBC   Date Value Ref Range Status   04/06/2024 9.18 3.40 - 10.80 10*3/mm3 Final   04/05/2024 7.44 3.40 - 10.80  10*3/mm3 Final   04/04/2024 7.26 3.40 - 10.80 10*3/mm3 Final   04/03/2024 8.91 3.40 - 10.80 10*3/mm3 Final      HGB Hemoglobin   Date Value Ref Range Status   04/06/2024 9.0 (L) 12.0 - 15.9 g/dL Final   04/05/2024 9.0 (L) 12.0 - 15.9 g/dL Final   04/04/2024 8.9 (L) 12.0 - 15.9 g/dL Final   04/03/2024 8.9 (L) 12.0 - 15.9 g/dL Final      HCT Hematocrit   Date Value Ref Range Status   04/06/2024 30.1 (L) 34.0 - 46.6 % Final   04/05/2024 30.4 (L) 34.0 - 46.6 % Final   04/04/2024 29.8 (L) 34.0 - 46.6 % Final   04/03/2024 30.6 (L) 34.0 - 46.6 % Final      Platelets Platelets   Date Value Ref Range Status   04/06/2024 313 140 - 450 10*3/mm3 Final   04/05/2024 323 140 - 450 10*3/mm3 Final   04/04/2024 325 140 - 450 10*3/mm3 Final   04/03/2024 313 140 - 450 10*3/mm3 Final      MCV MCV   Date Value Ref Range Status   04/06/2024 74.1 (L) 79.0 - 97.0 fL Final   04/05/2024 74.1 (L) 79.0 - 97.0 fL Final   04/04/2024 73.9 (L) 79.0 - 97.0 fL Final   04/03/2024 75.0 (L) 79.0 - 97.0 fL Final        Results from last 7 days   Lab Units 04/06/24  0442 04/05/24  0500 04/04/24  0600   SODIUM mmol/L 142 142 141   POTASSIUM mmol/L 4.0 3.9 4.0   CHLORIDE mmol/L 111* 111* 110*   CO2 mmol/L 20.0* 21.0* 24.0   BUN mg/dL 14 13 12   CREATININE mg/dL 0.99 0.97 1.09*   CALCIUM mg/dL 9.2 8.7 8.9   BILIRUBIN mg/dL 0.2 0.2 0.3   ALK PHOS U/L 114 115 119*   ALT (SGPT) U/L 18 12 13   AST (SGOT) U/L 21 12 14   GLUCOSE mg/dL 135* 120* 115*     Lab Results   Component Value Date    TROPONINT 9 04/03/2024     PT/INR:    Protime   Date Value Ref Range Status   04/03/2024 19.8 (H) 11.8 - 14.8 Seconds Final   /  INR   Date Value Ref Range Status   04/03/2024 1.61 (H) 0.91 - 1.09 Final       Imaging Results (Last 72 Hours)       Procedure Component Value Units Date/Time    NM Lung Scan Perfusion Particulate [[id number]] Collected: 04/03/24 1422     Updated: 04/03/24 1428    Narrative:      EXAMINATION: NM LUNG SCAN PERFUSION PARTICULATE- 4/3/2024 2:22 PM      HISTORY: Chest pressure, weakness. Clinical concern for pulmonary  embolism.     Dose: 5.7 mCi technetium 99m MAA intravenously.     COMPARISON: Chest x-rays 4/3/2024.     REPORT: After administration of the radiotracer, perfusion only  scintigraphic images of the lungs were obtained in the anterior,  posterior and oblique dimensions.     Distribution of activity within the lungs is homogeneous and normal.       Impression:      Low probability perfusion only lung scintigraphy.     This report was signed and finalized on 4/3/2024 2:25 PM by Dr. Nikolai Mac MD.       XR Chest PA & Lateral [[id number]] Collected: 04/03/24 1216     Updated: 04/03/24 1219    Narrative:      EXAM: XR CHEST PA AND LATERAL- 4/3/2024 11:04 AM     HISTORY: chest pain       COMPARISON: None.     TECHNIQUE: Frontal and lateral radiographs of the chest was obtained.     FINDINGS:      Support Devices: None.     Cardiac and Mediastinal Silhouettes: Normal.     Lungs/Pleura: No focal consolidation. No sizable pleural effusion. No  visible pneumothorax.     Osseous structures: No acute osseous finding.     Other: None.       Impression:         No acute cardiopulmonary abnormality.        This report was signed and finalized on 4/3/2024 12:16 PM by Jay Black.               Objective     Allergies   Allergen Reactions    Contrast Dye (Echo Or Unknown Ct/Mr) Shortness Of Breath and Swelling    Azithromycin Rash    Codeine Hives and Itching    Penicillin G Sodium Rash    Sulfa Antibiotics Hives and Itching       Medication Review: Performed  Current Facility-Administered Medications   Medication Dose Route Frequency Provider Last Rate Last Admin    acetaminophen (TYLENOL) tablet 650 mg  650 mg Oral Q6H PRN Glen Tapia MD   650 mg at 04/03/24 1536    atorvastatin (LIPITOR) tablet 20 mg  20 mg Oral Nightly Julissa Mcclain PA   20 mg at 04/05/24 2107    sennosides-docusate (PERICOLACE) 8.6-50 MG per tablet 2 tablet  2 tablet  Oral BID PRN Julissa Mcclain PA        And    polyethylene glycol (MIRALAX) packet 17 g  17 g Oral Daily PRN Julissa Mcclain PA        And    bisacodyl (DULCOLAX) EC tablet 5 mg  5 mg Oral Daily PRN Julissa Mcclain PA        And    bisacodyl (DULCOLAX) suppository 10 mg  10 mg Rectal Daily PRN Julissa Mcclain PA        calcium carbonate (TUMS) chewable tablet 500 mg (200 mg elemental)  2 tablet Oral BID PRN Julissa Mcclain PA   2 tablet at 04/06/24 0204    Calcium Replacement - Follow Nurse / BPA Driven Protocol   Does not apply PRN Julissa Mcclain PA        cilostazol (PLETAL) tablet 100 mg  100 mg Oral BID Julissa Mcclain PA   100 mg at 04/06/24 0826    dilTIAZem (CARDIZEM) 125 mg in 125 mL D5W infusion  5-15 mg/hr Intravenous Titrated Peña Trivedi MD        dronedarone (MULTAQ) tablet 400 mg  400 mg Oral Q12H Peña Trivedi MD   400 mg at 04/06/24 0830    Enoxaparin Sodium (LOVENOX) syringe 100 mg  1 mg/kg Subcutaneous Q12H Yaakov Tovar DO   100 mg at 04/06/24 0610    ferric gluconate (FERRLECIT) 250 mg in sodium chloride 0.9 % 250 mL IVPB  250 mg Intravenous Daily Glen Tapia  mL/hr at 04/06/24 0829 250 mg at 04/06/24 0829    folic acid-pyridoxine-cyanocobalamin (FOLBIC) tablet 1 tablet  1 tablet Oral Nightly Yaakov Tovar DO   1 tablet at 04/05/24 2106    Magnesium Standard Dose Replacement - Follow Nurse / BPA Driven Protocol   Does not apply PRN Julissa Mcclain PA        metoprolol succinate XL (TOPROL-XL) 24 hr tablet 50 mg  50 mg Oral Daily Julissa Mcclain PA   50 mg at 04/06/24 0826    nitroglycerin (NITROSTAT) SL tablet 0.4 mg  0.4 mg Sublingual Q5 Min PRN Julissa Mcclain PA        ondansetron ODT (ZOFRAN-ODT) disintegrating tablet 4 mg  4 mg Oral Q6H PRN Julissa Mcclain PA        pantoprazole (PROTONIX) EC tablet 40 mg  40 mg Oral Q AM Julissa Mcclain PA   40 mg at  "04/06/24 0610    Phosphorus Replacement - Follow Nurse / BPA Driven Protocol   Does not apply PRN Julissa Mcclain PA        Potassium Replacement - Follow Nurse / BPA Driven Protocol   Does not apply Julissa Wang PA        [Held by provider] rivaroxaban (XARELTO) tablet 20 mg  20 mg Oral Daily Julissa Mcclain PA        sodium chloride 0.9 % flush 10 mL  10 mL Intravenous Q12H Julissa Mcclain PA   10 mL at 04/06/24 0827    sodium chloride 0.9 % flush 10 mL  10 mL Intravenous PRN Julissa Mcclain PA        sodium chloride 0.9 % infusion 40 mL  40 mL Intravenous PRN Julissa Mcclain PA           Vital Sign Min/Max for last 24 hours  Temp  Min: 97.3 °F (36.3 °C)  Max: 98.7 °F (37.1 °C)   BP  Min: 117/91  Max: 143/64   Pulse  Min: 91  Max: 107   Resp  Min: 16  Max: 18   SpO2  Min: 93 %  Max: 95 %   No data recorded   No data recorded     Flowsheet Rows      Flowsheet Row First Filed Value   Admission Height 175.3 cm (69\") Documented at 04/03/2024 1050   Admission Weight 102 kg (223 lb 12.8 oz) Documented at 04/03/2024 1050            Results for orders placed during the hospital encounter of 04/03/24    Adult Transthoracic Echo Limited W/ Cont if Necessary Per Protocol    Interpretation Summary    Left ventricular systolic function is hyperdynamic (EF > 70%). Left ventricular ejection fraction appears to be greater than 70%.    Normal right ventricular cavity size and systolic function noted.    There is mild calcification of the aortic valve. There is mild thickening of the aortic valve. No aortic valve regurgitation is present. Mild aortic valve stenosis is present.    The mitral valve is structurally normal with no regurgitation or significant stenosis present.    No evidence of pulmonary hypertension is present.      Physical Exam:    General Appearance: Awake, alert, in no acute distress  Eyes: Pupils equal and reactive    Ears: Appear intact with no abnormalities " noted  Nose: Nares normal, no drainage  Neck: supple, trachea midline, no carotid bruit and no JVD  Back: no kyphosis present,    Lungs: respirations regular, respirations even and respirations unlabored  Heart: normal S1, S2, no significant murmurs   No gallops or rubs  no rub and no click  Abdomen: normal bowel sounds, no tenderness   Skin: no bleeding, bruising or rash  Extremities: no cyanosis  Psychiatric/Behavioral: Negative for agitation, behavioral problems, confusion, the patient does  appear to be nervous/anxious.       Results Review:   I reviewed the patient's new clinical results.  I reviewed the patient's new imaging results and agree with the interpretation.  I reviewed the patient's other test results and agree with the interpretation  I personally viewed and interpreted the patient's EKG/Telemetry data    Discussed with patient  Updated patient regarding any new or relevant abnormalities on review of records or any new findings on physical exam.   Mentioned to patient about purpose of visit and desirable health short and long term goals and objectives.     Reviewed available prior notes, consults, prior visits, laboratory findings, radiology and cardiology relevant reports.   Updated chart as applicable.   I have reviewed the patient's medical history in detail and updated the computerized patient record as relevant.          Assessment & Plan       Chest discomfort    PVD (peripheral vascular disease) with claudication    Anemia    Palpitations    Chest pain  Paroxysmal atrial fibrillation with rapid ventricular response    Plan    GI consultation appreciated  Patient declined upper GI endoscopy  Wants this done as outpatient when she has colonoscopy  No significant arrhythmia now  Continue on anticoagulation  Continue beta-blocker therapy as well as Multaq 400 mg p.o. twice daily  Will order Lexiscan Cardiolite stress test for tomorrow  N.p.o. after midnight  Results of echocardiogram reviewed and  read discussed with patient   Telemetry  Deep vein thrombosis prophylaxis/precautions [on anticoagulation]  Appropriate diet, fluid, sodium, caffeine, stimulants intake   Questions were encouraged, asked and answered to the patient's  understanding and satisfaction.  Compliance to diet and medications       Peña Trivedi MD  04/06/24  08:58 CDT    EMR Dragon/Transcription was used to dictate part of this note

## 2024-04-06 NOTE — PLAN OF CARE
Goal Outcome Evaluation:  Plan of Care Reviewed With: patient        Progress: improving  Outcome Evaluation: Patient had complaints of back pain which was relieved by prn tylenol.  No chest pain.  Patient will be NPO after midnight for a lexiscan tomorrow.

## 2024-04-06 NOTE — PROGRESS NOTES
Daily Progress Note  Tabitha Gonzalez  MRN: [id number] LOS: 0    Admit Date: 4/3/2024   4/6/2024 10:12 CDT    Subjective:      Chief Complaint:  No chief complaint on file.      Interval History:    Reviewed overnight events and nursing notes.   Improved.  No acute events overnight.  No arrhythmia at this time, continues to occasionally have runs of tachycardia.    Review of Systems   Constitutional:  Positive for fatigue. Negative for appetite change.   Respiratory:  Positive for chest tightness. Negative for cough and shortness of breath.    Cardiovascular:  Negative for chest pain.   Gastrointestinal:  Negative for abdominal pain, nausea and vomiting.   Neurological:  Positive for weakness.       DIET:  Diet: Cardiac; Healthy Heart (2-3 Na+); Fluid Consistency: Thin (IDDSI 0)  NPO Diet NPO Type: Strict NPO    Medications:   dilTIAZem, 5-15 mg/hr      atorvastatin, 20 mg, Oral, Nightly  cilostazol, 100 mg, Oral, BID  dronedarone, 400 mg, Oral, Q12H  enoxaparin, 1 mg/kg, Subcutaneous, Q12H  ferric gluconate, 250 mg, Intravenous, Daily  folic acid-pyridoxine-cyanocobalamin, 1 tablet, Oral, Nightly  metoprolol succinate XL, 50 mg, Oral, Daily  pantoprazole, 40 mg, Oral, Q AM  [Held by provider] rivaroxaban, 20 mg, Oral, Daily  sodium chloride, 10 mL, Intravenous, Q12H        Data:     Code Status:   Code Status and Medical Interventions:   Ordered at: 04/03/24 1105     Level Of Support Discussed With:    Patient     Code Status (Patient has no pulse and is not breathing):    CPR (Attempt to Resuscitate)     Medical Interventions (Patient has pulse or is breathing):    Full Support       Family History   Problem Relation Age of Onset    Coronary artery disease Mother     Cancer Father         lung    Rectal cancer Sister         In her 50's    Colon cancer Sister         rectal cancer in her 50 's    Stomach cancer Brother     Esophageal cancer Neg Hx     Liver cancer Neg Hx     Liver disease Neg Hx      Social  History     Socioeconomic History    Marital status:    Tobacco Use    Smoking status: Former     Current packs/day: 0.00     Types: Cigarettes     Quit date: 2014     Years since quitting: 10.2    Smokeless tobacco: Never   Vaping Use    Vaping status: Former   Substance and Sexual Activity    Alcohol use: Yes     Comment: Rarely     Drug use: No    Sexual activity: Defer       Labs:  Lab Results (last 72 hours)       Procedure Component Value Units Date/Time    Comprehensive Metabolic Panel [[id number]]  (Abnormal) Collected: 04/06/24 0442    Specimen: Blood Updated: 04/06/24 0516     Glucose 135 mg/dL      BUN 14 mg/dL      Creatinine 0.99 mg/dL      Sodium 142 mmol/L      Potassium 4.0 mmol/L      Chloride 111 mmol/L      CO2 20.0 mmol/L      Calcium 9.2 mg/dL      Total Protein 6.2 g/dL      Albumin 3.6 g/dL      ALT (SGPT) 18 U/L      AST (SGOT) 21 U/L      Alkaline Phosphatase 114 U/L      Total Bilirubin 0.2 mg/dL      Globulin 2.6 gm/dL      A/G Ratio 1.4 g/dL      BUN/Creatinine Ratio 14.1     Anion Gap 11.0 mmol/L      eGFR 63.0 mL/min/1.73     Narrative:      GFR Normal >60  Chronic Kidney Disease <60  Kidney Failure <15      CBC & Differential [[id number]]  (Abnormal) Collected: 04/06/24 0442    Specimen: Blood Updated: 04/06/24 0509    Narrative:      The following orders were created for panel order CBC & Differential.  Procedure                               Abnormality         Status                     ---------                               -----------         ------                     CBC Auto Differential[[id number]]        Abnormal            Final result                 Please view results for these tests on the individual orders.    CBC Auto Differential [[id number]]  (Abnormal) Collected: 04/06/24 0442    Specimen: Blood Updated: 04/06/24 0509     WBC 9.18 10*3/mm3      RBC 4.06 10*6/mm3      Hemoglobin 9.0 g/dL      Hematocrit 30.1 %      MCV 74.1 fL      MCH 22.2 pg      MCHC 29.9 g/dL       RDW 14.7 %      RDW-SD 39.2 fl      MPV 10.7 fL      Platelets 313 10*3/mm3      Neutrophil % 63.8 %      Lymphocyte % 20.8 %      Monocyte % 9.2 %      Eosinophil % 2.8 %      Basophil % 1.4 %      Immature Grans % 2.0 %      Neutrophils, Absolute 5.86 10*3/mm3      Lymphocytes, Absolute 1.91 10*3/mm3      Monocytes, Absolute 0.84 10*3/mm3      Eosinophils, Absolute 0.26 10*3/mm3      Basophils, Absolute 0.13 10*3/mm3      Immature Grans, Absolute 0.18 10*3/mm3      nRBC 0.0 /100 WBC     Vitamin B12 [[id number]] Collected: 04/06/24 0442    Specimen: Blood Updated: 04/06/24 0446    Folate [[id number]] Collected: 04/06/24 0442    Specimen: Blood Updated: 04/06/24 0446    Celiac Comprehensive Panel [[id number]] Collected: 04/05/24 1238    Specimen: Blood Updated: 04/05/24 1249    Manual Differential [[id number]]  (Abnormal) Collected: 04/05/24 0500    Specimen: Blood Updated: 04/05/24 0614     Neutrophil % 78.4 %      Lymphocyte % 15.5 %      Monocyte % 1.0 %      Eosinophil % 3.1 %      Atypical Lymphocyte % 2.1 %      Neutrophils Absolute 5.83 10*3/mm3      Lymphocytes Absolute 1.31 10*3/mm3      Monocytes Absolute 0.07 10*3/mm3      Eosinophils Absolute 0.23 10*3/mm3      Anisocytosis Slight/1+     Crenated RBC's Slight/1+     Microcytes Slight/1+     Ovalocytes Slight/1+     Poikilocytes Slight/1+     Polychromasia Slight/1+     WBC Morphology Normal     Platelet Morphology Normal    Comprehensive Metabolic Panel [[id number]]  (Abnormal) Collected: 04/05/24 0500    Specimen: Blood Updated: 04/05/24 0609     Glucose 120 mg/dL      BUN 13 mg/dL      Creatinine 0.97 mg/dL      Sodium 142 mmol/L      Potassium 3.9 mmol/L      Chloride 111 mmol/L      CO2 21.0 mmol/L      Calcium 8.7 mg/dL      Total Protein 6.1 g/dL      Albumin 3.5 g/dL      ALT (SGPT) 12 U/L      AST (SGOT) 12 U/L      Alkaline Phosphatase 115 U/L      Total Bilirubin 0.2 mg/dL      Globulin 2.6 gm/dL      A/G Ratio 1.3 g/dL      BUN/Creatinine  Ratio 13.4     Anion Gap 10.0 mmol/L      eGFR 64.6 mL/min/1.73     Narrative:      GFR Normal >60  Chronic Kidney Disease <60  Kidney Failure <15      CBC & Differential [[id number]]  (Abnormal) Collected: 04/05/24 0500    Specimen: Blood Updated: 04/05/24 0552    Narrative:      The following orders were created for panel order CBC & Differential.  Procedure                               Abnormality         Status                     ---------                               -----------         ------                     CBC Auto Differential[[id number]]        Abnormal            Final result                 Please view results for these tests on the individual orders.    CBC Auto Differential [[id number]]  (Abnormal) Collected: 04/05/24 0500    Specimen: Blood Updated: 04/05/24 0552     WBC 7.44 10*3/mm3      RBC 4.10 10*6/mm3      Hemoglobin 9.0 g/dL      Hematocrit 30.4 %      MCV 74.1 fL      MCH 22.0 pg      MCHC 29.6 g/dL      RDW 14.5 %      RDW-SD 38.9 fl      MPV 11.0 fL      Platelets 323 10*3/mm3      nRBC 0.0 /100 WBC     POC Glucose Once [[id number]]  (Normal) Collected: 04/04/24 0719    Specimen: Blood Updated: 04/04/24 0745     Glucose 128 mg/dL      Comment: : [id number] Matt YuMeter ID: [id number]       Comprehensive Metabolic Panel [[id number]]  (Abnormal) Collected: 04/04/24 0600    Specimen: Blood Updated: 04/04/24 0643     Glucose 115 mg/dL      BUN 12 mg/dL      Creatinine 1.09 mg/dL      Sodium 141 mmol/L      Potassium 4.0 mmol/L      Chloride 110 mmol/L      CO2 24.0 mmol/L      Calcium 8.9 mg/dL      Total Protein 6.3 g/dL      Albumin 3.7 g/dL      ALT (SGPT) 13 U/L      AST (SGOT) 14 U/L      Alkaline Phosphatase 119 U/L      Total Bilirubin 0.3 mg/dL      Globulin 2.6 gm/dL      A/G Ratio 1.4 g/dL      BUN/Creatinine Ratio 11.0     Anion Gap 7.0 mmol/L      eGFR 56.1 mL/min/1.73     Narrative:      GFR Normal >60  Chronic Kidney Disease <60  Kidney Failure <15      CBC & Differential  [[id number]]  (Abnormal) Collected: 04/04/24 0600    Specimen: Blood Updated: 04/04/24 0625    Narrative:      The following orders were created for panel order CBC & Differential.  Procedure                               Abnormality         Status                     ---------                               -----------         ------                     CBC Auto Differential[[id number]]        Abnormal            Final result                 Please view results for these tests on the individual orders.    CBC Auto Differential [[id number]]  (Abnormal) Collected: 04/04/24 0600    Specimen: Blood Updated: 04/04/24 0625     WBC 7.26 10*3/mm3      RBC 4.03 10*6/mm3      Hemoglobin 8.9 g/dL      Hematocrit 29.8 %      MCV 73.9 fL      MCH 22.1 pg      MCHC 29.9 g/dL      RDW 14.6 %      RDW-SD 39.3 fl      MPV 11.2 fL      Platelets 325 10*3/mm3      Neutrophil % 57.5 %      Lymphocyte % 27.5 %      Monocyte % 9.6 %      Eosinophil % 3.6 %      Basophil % 1.5 %      Neutrophils, Absolute 4.17 10*3/mm3      Lymphocytes, Absolute 2.00 10*3/mm3      Monocytes, Absolute 0.70 10*3/mm3      Eosinophils, Absolute 0.26 10*3/mm3      Basophils, Absolute 0.11 10*3/mm3     Occult Blood X 1, Stool - Stool, Per Rectum [[id number]]  (Normal) Collected: 04/03/24 2056    Specimen: Stool from Per Rectum Updated: 04/03/24 2107     Fecal Occult Blood Negative    High Sensitivity Troponin T [[id number]]  (Normal) Collected: 04/03/24 1345    Specimen: Blood Updated: 04/03/24 1417     HS Troponin T 9 ng/L     Narrative:      High Sensitive Troponin T Reference Range:  <14.0 ng/L- Negative Female for AMI  <22.0 ng/L- Negative Male for AMI  >=14 - Abnormal Female indicating possible myocardial injury.  >=22 - Abnormal Male indicating possible myocardial injury.   Clinicians would have to utilize clinical acumen, EKG, Troponin, and serial changes to determine if it is an Acute Myocardial Infarction or myocardial injury due to an underlying chronic  "condition.         Iron Profile [[id number]]  (Abnormal) Collected: 04/03/24 1216    Specimen: Blood Updated: 04/03/24 1320     Iron 15 mcg/dL      Iron Saturation (TSAT) 3 %      Transferrin 314 mg/dL      TIBC 468 mcg/dL     High Sensitivity Troponin T 2Hr [[id number]]  (Normal) Collected: 04/03/24 1216    Specimen: Blood Updated: 04/03/24 1303     HS Troponin T 9 ng/L      Troponin T Delta -3 ng/L     Narrative:      High Sensitive Troponin T Reference Range:  <14.0 ng/L- Negative Female for AMI  <22.0 ng/L- Negative Male for AMI  >=14 - Abnormal Female indicating possible myocardial injury.  >=22 - Abnormal Male indicating possible myocardial injury.   Clinicians would have to utilize clinical acumen, EKG, Troponin, and serial changes to determine if it is an Acute Myocardial Infarction or myocardial injury due to an underlying chronic condition.         D-dimer, Quantitative [[id number]]  (Abnormal) Collected: 04/03/24 1108    Specimen: Blood Updated: 04/03/24 1143     D-Dimer, Quantitative 1.02 MCGFEU/mL     Narrative:      According to the assay 's published package insert, a normal (<0.50 MCGFEU/mL) D-dimer result in conjunction with a non-high clinical probability assessment, excludes deep vein thrombosis (DVT) and pulmonary embolism (PE) with high sensitivity.    D-dimer values increase with age and this can make VTE exclusion of an older population difficult. To address this, the [employer], based on best available evidence and recent guidelines, recommends that clinicians use age-adjusted D-dimer thresholds in patients greater than 50 years of age with: a) a low probability of PE who do not meet all Pulmonary Embolism Rule Out Criteria, or b) in those with intermediate probability of PE.   The formula for an age-adjusted D-dimer cut-off is \"age/100\".  For example, a 60 year old patient would have an age-adjusted cut-off of 0.60 MCGFEU/mL and an 80 year old 0.80 MCGFEU/mL.    " Protime-INR [[id number]]  (Abnormal) Collected: 04/03/24 1108    Specimen: Blood Updated: 04/03/24 1143     Protime 19.8 Seconds      INR 1.61    TSH [[id number]]  (Normal) Collected: 04/03/24 1108    Specimen: Blood Updated: 04/03/24 1143     TSH 2.530 uIU/mL     Comprehensive Metabolic Panel [[id number]]  (Abnormal) Collected: 04/03/24 1108    Specimen: Blood Updated: 04/03/24 1138     Glucose 100 mg/dL      BUN 8 mg/dL      Creatinine 1.02 mg/dL      Sodium 142 mmol/L      Potassium 3.8 mmol/L      Chloride 108 mmol/L      CO2 22.0 mmol/L      Calcium 9.2 mg/dL      Total Protein 6.8 g/dL      Albumin 3.8 g/dL      ALT (SGPT) 13 U/L      AST (SGOT) 15 U/L      Alkaline Phosphatase 126 U/L      Total Bilirubin 0.3 mg/dL      Globulin 3.0 gm/dL      A/G Ratio 1.3 g/dL      BUN/Creatinine Ratio 7.8     Anion Gap 12.0 mmol/L      eGFR 60.8 mL/min/1.73     Narrative:      GFR Normal >60  Chronic Kidney Disease <60  Kidney Failure <15      High Sensitivity Troponin T [[id number]]  (Normal) Collected: 04/03/24 1108    Specimen: Blood Updated: 04/03/24 1136     HS Troponin T 12 ng/L     Narrative:      High Sensitive Troponin T Reference Range:  <14.0 ng/L- Negative Female for AMI  <22.0 ng/L- Negative Male for AMI  >=14 - Abnormal Female indicating possible myocardial injury.  >=22 - Abnormal Male indicating possible myocardial injury.   Clinicians would have to utilize clinical acumen, EKG, Troponin, and serial changes to determine if it is an Acute Myocardial Infarction or myocardial injury due to an underlying chronic condition.         CBC Auto Differential [[id number]]  (Abnormal) Collected: 04/03/24 1108    Specimen: Blood Updated: 04/03/24 1125     WBC 8.91 10*3/mm3      RBC 4.08 10*6/mm3      Hemoglobin 8.9 g/dL      Hematocrit 30.6 %      MCV 75.0 fL      MCH 21.8 pg      MCHC 29.1 g/dL      RDW 14.8 %      RDW-SD 40.0 fl      MPV 10.8 fL      Platelets 313 10*3/mm3      Neutrophil % 69.4 %      Lymphocyte % 18.5 %   "    Monocyte % 8.2 %      Eosinophil % 2.4 %      Basophil % 1.1 %      Immature Grans % 0.4 %      Neutrophils, Absolute 6.18 10*3/mm3      Lymphocytes, Absolute 1.65 10*3/mm3      Monocytes, Absolute 0.73 10*3/mm3      Eosinophils, Absolute 0.21 10*3/mm3      Basophils, Absolute 0.10 10*3/mm3      Immature Grans, Absolute 0.04 10*3/mm3      nRBC 0.0 /100 WBC                   Objective:     Vitals: /64 (BP Location: Right arm, Patient Position: Lying)   Pulse 91   Temp 97.5 °F (36.4 °C) (Oral)   Resp 16   Ht 175.3 cm (69\")   Wt 101 kg (223 lb)   SpO2 94%   BMI 32.93 kg/m²    Intake/Output Summary (Last 24 hours) at 2024 1012  Last data filed at 2024 0829  Gross per 24 hour   Intake 930 ml   Output --   Net 930 ml    Temp (24hrs), Av.8 °F (36.6 °C), Min:97.3 °F (36.3 °C), Max:98.7 °F (37.1 °C)      Physical Exam  Vitals reviewed.   Constitutional:       Appearance: Normal appearance. She is not ill-appearing.   HENT:      Head: Normocephalic and atraumatic.   Eyes:      General:         Right eye: No discharge.         Left eye: No discharge.      Extraocular Movements: Extraocular movements intact.      Conjunctiva/sclera: Conjunctivae normal.   Cardiovascular:      Rate and Rhythm: Regular rhythm. Tachycardia present.      Pulses: Normal pulses.      Heart sounds: No murmur heard.  Pulmonary:      Effort: Pulmonary effort is normal. No respiratory distress.   Abdominal:      General: Abdomen is flat. Bowel sounds are normal. There is no distension.   Musculoskeletal:      Right lower leg: No edema.      Left lower leg: No edema.   Skin:     Capillary Refill: Capillary refill takes less than 2 seconds.   Neurological:      General: No focal deficit present.      Mental Status: She is alert.   Psychiatric:         Mood and Affect: Mood normal.         Behavior: Behavior normal.             Assessment and Plan:     Primary Problem:  Chest discomfort    Hospital Problem list:    Chest " discomfort    PVD (peripheral vascular disease) with claudication    Anemia    Palpitations    Chest pain      PMH:  Past Medical History:   Diagnosis Date    AVM (arteriovenous malformation) of colon     Colon polyp     COPD (chronic obstructive pulmonary disease)     DVT (deep venous thrombosis)     Family history of colon cancer     Family history of colonic polyps     GERD (gastroesophageal reflux disease)     History of adenomatous polyp of colon     History of transfusion     after vascular surgery    Hypercholesterolemia     Hypertension     IBS (irritable bowel syndrome)     Migraine     Peripheral artery disease     PONV (postoperative nausea and vomiting)     PVD (peripheral vascular disease)        Treatment Plan:  Anemia: Hemoglobin stable.  Negative FOBT.  Severely iron deficient.  -Replace iron IV daily for 3 days, started 4/4/2024.  - Plan for outpatient EGD/C-scope per GI as she will need to hold blood thinners.     Chest pressure: Likely secondary to above.  Troponin and VQ scan normal.  Cardiology consulted, appreciate recommendations.    Paroxysmal atrial tachycardia: Lexiscan recommended per cardiology, this will be done tomorrow.     History of DVT: On Xarelto, held during hospitalization on Lovenox.     CODE STATUS: Full     DVT prophylaxis: As above     Disposition: Inpatient    Reviewed treatment plans with the patient and/or family.   30 minutes spent in face to face interaction and coordination of care.     Electronically signed by Glen Tapia MD on 4/6/2024 at 10:12 CDT

## 2024-04-06 NOTE — PLAN OF CARE
Problem: Adult Inpatient Plan of Care  Goal: Plan of Care Review  Outcome: Ongoing, Progressing  Goal: Patient-Specific Goal (Individualized)  Outcome: Ongoing, Progressing  Goal: Absence of Hospital-Acquired Illness or Injury  Outcome: Ongoing, Progressing  Intervention: Identify and Manage Fall Risk  Recent Flowsheet Documentation  Taken 4/6/2024 0400 by Sonja Fournier RN  Safety Promotion/Fall Prevention: safety round/check completed  Taken 4/6/2024 0200 by Sonja Fournier RN  Safety Promotion/Fall Prevention: safety round/check completed  Taken 4/6/2024 0000 by Sonja Fournier RN  Safety Promotion/Fall Prevention: safety round/check completed  Taken 4/5/2024 2200 by Sonja Fournier RN  Safety Promotion/Fall Prevention: safety round/check completed  Taken 4/5/2024 2100 by Sonja Fournier RN  Safety Promotion/Fall Prevention: safety round/check completed  Taken 4/5/2024 2000 by Sonja Fournier RN  Safety Promotion/Fall Prevention: safety round/check completed  Taken 4/5/2024 1900 by Sonja Fournier RN  Safety Promotion/Fall Prevention: safety round/check completed  Goal: Optimal Comfort and Wellbeing  Outcome: Ongoing, Progressing  Goal: Readiness for Transition of Care  Outcome: Ongoing, Progressing     Problem: Adjustment to Illness (Acute Coronary Syndrome)  Goal: Optimal Adaptation to Illness  Outcome: Ongoing, Progressing     Problem: Dysrhythmia (Acute Coronary Syndrome)  Goal: Normalized Cardiac Rhythm  Outcome: Ongoing, Progressing     Problem: Cardiac-Related Pain (Acute Coronary Syndrome)  Goal: Absence of Cardiac-Related Pain  Outcome: Ongoing, Progressing     Problem: Hemodynamic Instability (Acute Coronary Syndrome)  Goal: Effective Cardiac Pump Function  Outcome: Ongoing, Progressing     Problem: Tissue Perfusion (Acute Coronary Syndrome)  Goal: Adequate Tissue Perfusion  Outcome: Ongoing, Progressing     Problem: Chest Pain  Goal: Resolution of Chest Pain Symptoms  Outcome:  Ongoing, Progressing   Goal Outcome Evaluation:

## 2024-04-07 ENCOUNTER — APPOINTMENT (OUTPATIENT)
Dept: CARDIOLOGY | Facility: HOSPITAL | Age: 64
End: 2024-04-07
Payer: MEDICARE

## 2024-04-07 VITALS
BODY MASS INDEX: 33.03 KG/M2 | OXYGEN SATURATION: 95 % | RESPIRATION RATE: 16 BRPM | WEIGHT: 223 LBS | SYSTOLIC BLOOD PRESSURE: 145 MMHG | HEIGHT: 69 IN | TEMPERATURE: 97.6 F | DIASTOLIC BLOOD PRESSURE: 74 MMHG | HEART RATE: 94 BPM

## 2024-04-07 LAB
ALBUMIN SERPL-MCNC: 3.8 G/DL (ref 3.5–5.2)
ALBUMIN/GLOB SERPL: 1.4 G/DL
ALP SERPL-CCNC: 123 U/L (ref 39–117)
ALT SERPL W P-5'-P-CCNC: 28 U/L (ref 1–33)
ANION GAP SERPL CALCULATED.3IONS-SCNC: 10 MMOL/L (ref 5–15)
AST SERPL-CCNC: 34 U/L (ref 1–32)
BASOPHILS # BLD AUTO: 0.11 10*3/MM3 (ref 0–0.2)
BASOPHILS NFR BLD AUTO: 1.1 % (ref 0–1.5)
BH CV NUCLEAR PRIOR STUDY: 2
BH CV REST NUCLEAR ISOTOPE DOSE: 11 MCI
BH CV STRESS BP STAGE 1: NORMAL
BH CV STRESS COMMENTS STAGE 1: NORMAL
BH CV STRESS DOSE REGADENOSON STAGE 1: 0.4
BH CV STRESS DURATION MIN STAGE 1: 0
BH CV STRESS DURATION SEC STAGE 1: 10
BH CV STRESS HR STAGE 1: 117
BH CV STRESS NUCLEAR ISOTOPE DOSE: 35.2 MCI
BH CV STRESS PROTOCOL 1: NORMAL
BH CV STRESS RECOVERY BP: NORMAL MMHG
BH CV STRESS RECOVERY HR: 105 BPM
BH CV STRESS STAGE 1: 1
BILIRUB SERPL-MCNC: 0.3 MG/DL (ref 0–1.2)
BUN SERPL-MCNC: 14 MG/DL (ref 8–23)
BUN/CREAT SERPL: 14.1 (ref 7–25)
CALCIUM SPEC-SCNC: 9.1 MG/DL (ref 8.6–10.5)
CHLORIDE SERPL-SCNC: 109 MMOL/L (ref 98–107)
CO2 SERPL-SCNC: 22 MMOL/L (ref 22–29)
CREAT SERPL-MCNC: 0.99 MG/DL (ref 0.57–1)
DEPRECATED RDW RBC AUTO: 39.8 FL (ref 37–54)
EGFRCR SERPLBLD CKD-EPI 2021: 63 ML/MIN/1.73
EOSINOPHIL # BLD AUTO: 0.19 10*3/MM3 (ref 0–0.4)
EOSINOPHIL NFR BLD AUTO: 1.9 % (ref 0.3–6.2)
ERYTHROCYTE [DISTWIDTH] IN BLOOD BY AUTOMATED COUNT: 15.4 % (ref 12.3–15.4)
GLOBULIN UR ELPH-MCNC: 2.8 GM/DL
GLUCOSE SERPL-MCNC: 115 MG/DL (ref 65–99)
HCT VFR BLD AUTO: 30.8 % (ref 34–46.6)
HGB BLD-MCNC: 9.2 G/DL (ref 12–15.9)
LV EF NUC BP: 70 %
LYMPHOCYTES # BLD AUTO: 1.47 10*3/MM3 (ref 0.7–3.1)
LYMPHOCYTES NFR BLD AUTO: 14.4 % (ref 19.6–45.3)
MAXIMAL PREDICTED HEART RATE: 154 BPM
MCH RBC QN AUTO: 22.1 PG (ref 26.6–33)
MCHC RBC AUTO-ENTMCNC: 29.9 G/DL (ref 31.5–35.7)
MCV RBC AUTO: 73.9 FL (ref 79–97)
MONOCYTES # BLD AUTO: 0.87 10*3/MM3 (ref 0.1–0.9)
MONOCYTES NFR BLD AUTO: 8.5 % (ref 5–12)
NEUTROPHILS NFR BLD AUTO: 7.43 10*3/MM3 (ref 1.7–7)
NEUTROPHILS NFR BLD AUTO: 72.5 % (ref 42.7–76)
PERCENT MAX PREDICTED HR: 75.97 %
PLATELET # BLD AUTO: 311 10*3/MM3 (ref 140–450)
PMV BLD AUTO: 11.1 FL (ref 6–12)
POTASSIUM SERPL-SCNC: 4 MMOL/L (ref 3.5–5.2)
PROT SERPL-MCNC: 6.6 G/DL (ref 6–8.5)
RBC # BLD AUTO: 4.17 10*6/MM3 (ref 3.77–5.28)
SODIUM SERPL-SCNC: 141 MMOL/L (ref 136–145)
STRESS BASELINE BP: NORMAL MMHG
STRESS BASELINE HR: 94 BPM
STRESS PERCENT HR: 89 %
STRESS POST PEAK BP: NORMAL MMHG
STRESS POST PEAK HR: 117 BPM
STRESS TARGET HR: 131 BPM
WBC NRBC COR # BLD AUTO: 10.23 10*3/MM3 (ref 3.4–10.8)

## 2024-04-07 PROCEDURE — 0 TECHNETIUM TETROFOSMIN KIT: Performed by: FAMILY MEDICINE

## 2024-04-07 PROCEDURE — G0378 HOSPITAL OBSERVATION PER HR: HCPCS

## 2024-04-07 PROCEDURE — 25010000002 ENOXAPARIN PER 10 MG: Performed by: EMERGENCY MEDICINE

## 2024-04-07 PROCEDURE — 85025 COMPLETE CBC W/AUTO DIFF WBC: CPT | Performed by: FAMILY MEDICINE

## 2024-04-07 PROCEDURE — 25010000002 REGADENOSON 0.4 MG/5ML SOLUTION: Performed by: FAMILY MEDICINE

## 2024-04-07 PROCEDURE — 96372 THER/PROPH/DIAG INJ SC/IM: CPT

## 2024-04-07 PROCEDURE — 93017 CV STRESS TEST TRACING ONLY: CPT

## 2024-04-07 PROCEDURE — 78452 HT MUSCLE IMAGE SPECT MULT: CPT

## 2024-04-07 PROCEDURE — A9502 TC99M TETROFOSMIN: HCPCS | Performed by: FAMILY MEDICINE

## 2024-04-07 PROCEDURE — 99214 OFFICE O/P EST MOD 30 MIN: CPT | Performed by: INTERNAL MEDICINE

## 2024-04-07 PROCEDURE — 80053 COMPREHEN METABOLIC PANEL: CPT | Performed by: FAMILY MEDICINE

## 2024-04-07 PROCEDURE — 93018 CV STRESS TEST I&R ONLY: CPT | Performed by: INTERNAL MEDICINE

## 2024-04-07 PROCEDURE — 78452 HT MUSCLE IMAGE SPECT MULT: CPT | Performed by: INTERNAL MEDICINE

## 2024-04-07 RX ORDER — FERROUS GLUCONATE 324(38)MG
324 TABLET ORAL
Qty: 90 TABLET | Refills: 0 | Status: SHIPPED | OUTPATIENT
Start: 2024-04-07

## 2024-04-07 RX ORDER — REGADENOSON 0.08 MG/ML
0.4 INJECTION, SOLUTION INTRAVENOUS ONCE
Status: COMPLETED | OUTPATIENT
Start: 2024-04-07 | End: 2024-04-07

## 2024-04-07 RX ADMIN — DRONEDARONE 400 MG: 400 TABLET, FILM COATED ORAL at 08:37

## 2024-04-07 RX ADMIN — TETROFOSMIN 1 DOSE: 1.38 INJECTION, POWDER, LYOPHILIZED, FOR SOLUTION INTRAVENOUS at 09:10

## 2024-04-07 RX ADMIN — ENOXAPARIN SODIUM 100 MG: 100 INJECTION SUBCUTANEOUS at 06:10

## 2024-04-07 RX ADMIN — REGADENOSON 0.4 MG: 0.08 INJECTION, SOLUTION INTRAVENOUS at 09:03

## 2024-04-07 RX ADMIN — TETROFOSMIN 1 DOSE: 1.38 INJECTION, POWDER, LYOPHILIZED, FOR SOLUTION INTRAVENOUS at 09:02

## 2024-04-07 RX ADMIN — METOPROLOL SUCCINATE 50 MG: 50 TABLET, EXTENDED RELEASE ORAL at 08:37

## 2024-04-07 RX ADMIN — CILOSTAZOL 100 MG: 100 TABLET ORAL at 08:37

## 2024-04-07 NOTE — PLAN OF CARE
Problem: Adult Inpatient Plan of Care  Goal: Plan of Care Review  Outcome: Ongoing, Progressing  Goal: Patient-Specific Goal (Individualized)  Outcome: Ongoing, Progressing  Goal: Absence of Hospital-Acquired Illness or Injury  Outcome: Ongoing, Progressing  Intervention: Identify and Manage Fall Risk  Recent Flowsheet Documentation  Taken 4/7/2024 0200 by Sonja Fournier RN  Safety Promotion/Fall Prevention: safety round/check completed  Taken 4/7/2024 0000 by Sonja Fournier RN  Safety Promotion/Fall Prevention: safety round/check completed  Taken 4/6/2024 2200 by Sonja Fournier RN  Safety Promotion/Fall Prevention: safety round/check completed  Taken 4/6/2024 2100 by Sonja Fournier RN  Safety Promotion/Fall Prevention: safety round/check completed  Taken 4/6/2024 2000 by Sonja Fournier RN  Safety Promotion/Fall Prevention: safety round/check completed  Taken 4/6/2024 1900 by Sonja Fournier RN  Safety Promotion/Fall Prevention: safety round/check completed  Goal: Optimal Comfort and Wellbeing  Outcome: Ongoing, Progressing  Goal: Readiness for Transition of Care  Outcome: Ongoing, Progressing     Problem: Adjustment to Illness (Acute Coronary Syndrome)  Goal: Optimal Adaptation to Illness  Outcome: Ongoing, Progressing     Problem: Dysrhythmia (Acute Coronary Syndrome)  Goal: Normalized Cardiac Rhythm  Outcome: Ongoing, Progressing     Problem: Cardiac-Related Pain (Acute Coronary Syndrome)  Goal: Absence of Cardiac-Related Pain  Outcome: Ongoing, Progressing     Problem: Hemodynamic Instability (Acute Coronary Syndrome)  Goal: Effective Cardiac Pump Function  Outcome: Ongoing, Progressing     Problem: Tissue Perfusion (Acute Coronary Syndrome)  Goal: Adequate Tissue Perfusion  Outcome: Ongoing, Progressing     Problem: Chest Pain  Goal: Resolution of Chest Pain Symptoms  Outcome: Ongoing, Progressing   Goal Outcome Evaluation:

## 2024-04-07 NOTE — PROGRESS NOTES
LOS: 0 days   Patient Care Team:  Nikolai Treadwell MD as PCP - General  Nikolai Treadwell MD as PCP - Family Medicine  Vamshi Harris MD as Consulting Physician (Pulmonary Disease)    Chief Complaint: Palpitations, chest pain and shortness of breath     Subjective    Tabitha Gonzalez is a 64 y.o. female who is being seen in follow-up  Overall feels improved  No chest pain  No palpitation  No presyncope  No syncope  No orthopnea  No paroxysmal nocturnal dyspnea  Hemodynamically stable  N.p.o. for Lexiscan Cardiolite stress test when I saw her around 7 AM this morning  Latest labs as referenced below  Telemetry: Paroxysmal atrial fibrillation yesterday now maintaining sinus rhythm    Review of Systems   Constitutional: No chills   Has fatigue   No fever.   HENT: Negative.    Eyes: Negative.    Respiratory: Negative for cough,   No chest wall soreness,   Shortness of breath,   no wheezing, no stridor.    Cardiovascular: As above  Gastrointestinal: Negative for abdominal distention,  No abdominal pain,   No blood in stool,   No constipation,   No diarrhea,   No nausea   No vomiting.   Endocrine: Negative.    Genitourinary: Negative for difficulty urinating, dysuria, flank pain and hematuria.   Musculoskeletal: Negative.    Skin: Negative for rash and wound.   Allergic/Immunologic: Negative.    Neurological: Negative for dizziness, syncope, weakness,   No light-headedness  No  headaches.   Hematological: Does not bruise/bleed easily.   Psychiatric/Behavioral: Negative for agitation or behavioral problems,   No confusion,   the patient is  nervous/anxious.       History:   Past Medical History:   Diagnosis Date    AVM (arteriovenous malformation) of colon     Colon polyp     COPD (chronic obstructive pulmonary disease)     DVT (deep venous thrombosis)     Family history of colon cancer     Family history of colonic polyps     GERD (gastroesophageal reflux disease)     History of adenomatous polyp of colon      History of transfusion     after vascular surgery    Hypercholesterolemia     Hypertension     IBS (irritable bowel syndrome)     Migraine     Peripheral artery disease     PONV (postoperative nausea and vomiting)     PVD (peripheral vascular disease)      Past Surgical History:   Procedure Laterality Date    AORTA FEMORAL BYPASS      APPENDECTOMY      CHOLECYSTECTOMY      COLONOSCOPY  10/28/2011    COLONOSCOPY N/A 6/19/2017    Procedure: COLONOSCOPY WITH ANESTHESIA;  Surgeon: Zack Do MD;  Location: [facility] ENDOSCOPY;  Service:     COLONOSCOPY N/A 12/3/2020    Procedure: COLONOSCOPY WITH ANESTHESIA;  Surgeon: Zack Do MD;  Location: [facility] ENDOSCOPY;  Service: Gastroenterology;  Laterality: N/A;  pre hx adenomatous colon polyp, family hx colon ca  post polyps  Nikolai Treadwell MD    ENDOSCOPY  01/26/2007    ENDOSCOPY N/A 6/19/2017    Procedure: ESOPHAGOGASTRODUODENOSCOPY WITH ANESTHESIA;  Surgeon: Zack Do MD;  Location: [facility] ENDOSCOPY;  Service:     HYSTERECTOMY      PERIPHERAL ARTERIAL STENT GRAFT       Social History     Socioeconomic History    Marital status:    Tobacco Use    Smoking status: Former     Current packs/day: 0.00     Types: Cigarettes     Quit date: 2014     Years since quitting: 10.2    Smokeless tobacco: Never   Vaping Use    Vaping status: Former   Substance and Sexual Activity    Alcohol use: Yes     Comment: Rarely     Drug use: No    Sexual activity: Defer     Family History   Problem Relation Age of Onset    Coronary artery disease Mother     Cancer Father         lung    Rectal cancer Sister         In her 50's    Colon cancer Sister         rectal cancer in her 50 's    Stomach cancer Brother     Esophageal cancer Neg Hx     Liver cancer Neg Hx     Liver disease Neg Hx        Labs:  WBC WBC   Date Value Ref Range Status   04/07/2024 10.23 3.40 - 10.80 10*3/mm3 Final   04/06/2024 9.18 3.40 - 10.80 10*3/mm3 Final   04/05/2024 7.44 3.40 - 10.80  "10*3/mm3 Final      HGB Hemoglobin   Date Value Ref Range Status   04/07/2024 9.2 (L) 12.0 - 15.9 g/dL Final   04/06/2024 9.0 (L) 12.0 - 15.9 g/dL Final   04/05/2024 9.0 (L) 12.0 - 15.9 g/dL Final      HCT Hematocrit   Date Value Ref Range Status   04/07/2024 30.8 (L) 34.0 - 46.6 % Final   04/06/2024 30.1 (L) 34.0 - 46.6 % Final   04/05/2024 30.4 (L) 34.0 - 46.6 % Final      Platelets Platelets   Date Value Ref Range Status   04/07/2024 311 140 - 450 10*3/mm3 Final   04/06/2024 313 140 - 450 10*3/mm3 Final   04/05/2024 323 140 - 450 10*3/mm3 Final      MCV MCV   Date Value Ref Range Status   04/07/2024 73.9 (L) 79.0 - 97.0 fL Final   04/06/2024 74.1 (L) 79.0 - 97.0 fL Final   04/05/2024 74.1 (L) 79.0 - 97.0 fL Final        Results from last 7 days   Lab Units 04/07/24  0537 04/06/24  0442 04/05/24  0500   SODIUM mmol/L 141 142 142   POTASSIUM mmol/L 4.0 4.0 3.9   CHLORIDE mmol/L 109* 111* 111*   CO2 mmol/L 22.0 20.0* 21.0*   BUN mg/dL 14 14 13   CREATININE mg/dL 0.99 0.99 0.97   CALCIUM mg/dL 9.1 9.2 8.7   BILIRUBIN mg/dL 0.3 0.2 0.2   ALK PHOS U/L 123* 114 115   ALT (SGPT) U/L 28 18 12   AST (SGOT) U/L 34* 21 12   GLUCOSE mg/dL 115* 135* 120*     Lab Results   Component Value Date    TROPONINT 9 04/03/2024     PT/INR:    No results found for: \"PROTIME\"  /  No results found for: \"INR\"      Imaging Results (Last 72 Hours)       ** No results found for the last 72 hours. **            Objective     Allergies   Allergen Reactions    Contrast Dye (Echo Or Unknown Ct/Mr) Shortness Of Breath and Swelling    Azithromycin Rash    Codeine Hives and Itching    Penicillin G Sodium Rash    Sulfa Antibiotics Hives and Itching       Medication Review: Performed  Current Facility-Administered Medications   Medication Dose Route Frequency Provider Last Rate Last Admin    acetaminophen (TYLENOL) tablet 650 mg  650 mg Oral Q6H PRN Glen Tapia MD   650 mg at 04/06/24 1314    atorvastatin (LIPITOR) tablet 20 mg  20 mg Oral " Nightly Julissa Mcclain PA   20 mg at 04/06/24 2056    sennosides-docusate (PERICOLACE) 8.6-50 MG per tablet 2 tablet  2 tablet Oral BID PRN Julissa Mcclain PA        And    polyethylene glycol (MIRALAX) packet 17 g  17 g Oral Daily PRN Julissa Mcclain PA        And    bisacodyl (DULCOLAX) EC tablet 5 mg  5 mg Oral Daily PRN Julissa Mcclain PA        And    bisacodyl (DULCOLAX) suppository 10 mg  10 mg Rectal Daily PRN Julissa Mcclain PA        calcium carbonate (TUMS) chewable tablet 500 mg (200 mg elemental)  2 tablet Oral BID PRN Julissa Mcclain PA   2 tablet at 04/06/24 0204    Calcium Replacement - Follow Nurse / BPA Driven Protocol   Does not apply PRN Julissa Mcclain PA        cilostazol (PLETAL) tablet 100 mg  100 mg Oral BID Julissa Mcclain PA   100 mg at 04/07/24 0837    dilTIAZem (CARDIZEM) 125 mg in 125 mL D5W infusion  5-15 mg/hr Intravenous Titrated Peña Trivedi MD        dronedarone (MULTAQ) tablet 400 mg  400 mg Oral Q12H Peña Trivedi MD   400 mg at 04/07/24 0837    Enoxaparin Sodium (LOVENOX) syringe 100 mg  1 mg/kg Subcutaneous Q12H Yaakov Tovar,    100 mg at 04/07/24 0610    folic acid-pyridoxine-cyanocobalamin (FOLBIC) tablet 1 tablet  1 tablet Oral Nightly Yaakov Toavr, DO   1 tablet at 04/06/24 2056    Magnesium Standard Dose Replacement - Follow Nurse / BPA Driven Protocol   Does not apply PRN Julissa Mcclain PA        metoprolol succinate XL (TOPROL-XL) 24 hr tablet 50 mg  50 mg Oral Daily Julissa Mcclain PA   50 mg at 04/07/24 0837    nitroglycerin (NITROSTAT) SL tablet 0.4 mg  0.4 mg Sublingual Q5 Min PRN Julissa Mcclain PA        ondansetron ODT (ZOFRAN-ODT) disintegrating tablet 4 mg  4 mg Oral Q6H PRN Julissa Mcclain PA        pantoprazole (PROTONIX) EC tablet 40 mg  40 mg Oral Q AM Julissa Mcclain PA   40 mg at 04/06/24 0610    Phosphorus Replacement -  "Follow Nurse / BPA Driven Protocol   Does not apply PRN Julissa Mcclain PA        Potassium Replacement - Follow Nurse / BPA Driven Protocol   Does not apply PRN Julissa Mcclain PA        [Held by provider] rivaroxaban (XARELTO) tablet 20 mg  20 mg Oral Daily Julissa Mcclain PA        sodium chloride 0.9 % flush 10 mL  10 mL Intravenous Q12H Julissa Mcclain PA   10 mL at 04/06/24 0827    sodium chloride 0.9 % flush 10 mL  10 mL Intravenous PRN Julissa Mcclain PA        sodium chloride 0.9 % infusion 40 mL  40 mL Intravenous PRN Julissa Mcclain PA           Vital Sign Min/Max for last 24 hours  Temp  Min: 97.6 °F (36.4 °C)  Max: 98.9 °F (37.2 °C)   BP  Min: 114/51  Max: 145/74   Pulse  Min: 81  Max: 98   Resp  Min: 16  Max: 16   SpO2  Min: 93 %  Max: 95 %   No data recorded   No data recorded     Flowsheet Rows      Flowsheet Row First Filed Value   Admission Height 175.3 cm (69\") Documented at 04/03/2024 1050   Admission Weight 102 kg (223 lb 12.8 oz) Documented at 04/03/2024 1050            Results for orders placed during the hospital encounter of 04/03/24    Adult Transthoracic Echo Limited W/ Cont if Necessary Per Protocol    Interpretation Summary    Left ventricular systolic function is hyperdynamic (EF > 70%). Left ventricular ejection fraction appears to be greater than 70%.    Normal right ventricular cavity size and systolic function noted.    There is mild calcification of the aortic valve. There is mild thickening of the aortic valve. No aortic valve regurgitation is present. Mild aortic valve stenosis is present.    The mitral valve is structurally normal with no regurgitation or significant stenosis present.    No evidence of pulmonary hypertension is present.      Physical Exam:    General Appearance: Awake, alert, in no acute distress  Eyes: Pupils equal and reactive    Ears: Appear intact with no abnormalities noted  Nose: Nares normal, no " drainage  Neck: supple, trachea midline, no carotid bruit and no JVD  Back: no kyphosis present,    Lungs: respirations regular, respirations even and respirations unlabored  Heart: normal S1, S2, no significant murmurs   No gallops or rubs  no rub and no click  Abdomen: normal bowel sounds, no tenderness   Skin: no bleeding, bruising or rash  Extremities: no cyanosis  Psychiatric/Behavioral: Negative for agitation, behavioral problems, confusion, the patient does  appear to be nervous/anxious.       Results Review:   I reviewed the patient's new clinical results.  I reviewed the patient's new imaging results and agree with the interpretation.  I reviewed the patient's other test results and agree with the interpretation  I personally viewed and interpreted the patient's EKG/Telemetry data    Discussed with patient  Updated patient regarding any new or relevant abnormalities on review of records or any new findings on physical exam.   Mentioned to patient about purpose of visit and desirable health short and long term goals and objectives.     Reviewed available prior notes, consults, prior visits, laboratory findings, radiology and cardiology relevant reports.   Updated chart as applicable.   I have reviewed the patient's medical history in detail and updated the computerized patient record as relevant.          Assessment & Plan       Chest discomfort    PVD (peripheral vascular disease) with claudication    Anemia    Palpitations    Chest pain  Paroxysmal atrial fibrillation with rapid ventricular response    Plan    Check results of Lexiscan Cardiolite stress test  Continue on anticoagulation  Continue Multaq 400 mg p.o. twice daily  Further recommendation pending results of Lexiscan Cardiolite stress test  Plans for outpatient upper GI endoscopy and colonoscopy  Clinically improved significantly  Care plan discussed with patient  Continue on beta-blocker therapy  Monitor for any overt or covert signs of  bleeding  Low-sodium heart healthy diet  Follow-up with primary cardiology team within 4 weeks of discharge         Peña Trivedi MD  04/07/24  09:21 CDT    EMR Dragon/Transcription was used to dictate part of this note

## 2024-04-08 ENCOUNTER — READMISSION MANAGEMENT (OUTPATIENT)
Dept: CALL CENTER | Facility: HOSPITAL | Age: 64
End: 2024-04-08
Payer: MEDICARE

## 2024-04-08 LAB
ENDOMYSIUM IGA SER QL: NEGATIVE
GLIADIN PEPTIDE IGA SER-ACNC: 5 UNITS (ref 0–19)
GLIADIN PEPTIDE IGG SER-ACNC: 4 UNITS (ref 0–19)
IGA SERPL-MCNC: 245 MG/DL (ref 87–352)
QT INTERVAL: 360 MS
QTC INTERVAL: 471 MS
TTG IGA SER-ACNC: <2 U/ML (ref 0–3)
TTG IGG SER-ACNC: 3 U/ML (ref 0–5)

## 2024-04-08 NOTE — OUTREACH NOTE
Prep Survey      Flowsheet Row Responses   [facility] facility patient discharged from? [facility]   Is LACE score < 7 ? No   Eligibility Readm Mgmt   Discharge diagnosis Chest discomfort   Does the patient have one of the following disease processes/diagnoses(primary or secondary)? Other   Does the patient have Home health ordered? No   Is there a DME ordered? No   Prep survey completed? Yes            Karyn MEI - Registered Nurse

## 2024-04-10 ENCOUNTER — READMISSION MANAGEMENT (OUTPATIENT)
Dept: CALL CENTER | Facility: HOSPITAL | Age: 64
End: 2024-04-10
Payer: MEDICARE

## 2024-04-10 ENCOUNTER — TELEPHONE (OUTPATIENT)
Dept: CARDIOLOGY | Facility: CLINIC | Age: 64
End: 2024-04-10
Payer: MEDICARE

## 2024-04-10 NOTE — OUTREACH NOTE
"Medical Week 1 Survey      Flowsheet Row Responses   [facility] patient discharged from? [place]   Does the patient have one of the following disease processes/diagnoses(primary or secondary)? Other   Week 1 attempt successful? Yes   Call start time 1302   Call end time 1310   Discharge diagnosis Chest discomfort   Meds reviewed with patient/caregiver? Yes   Is the patient having any side effects they believe may be caused by any medication additions or changes? Yes   Side effects comments  Pt has placed a call to her MD regarding concerns w/Multaq,  reports about 30 minutes after dosing today she felt as if her \"tongue was tingly, throat was swelling, eyes foggy  and stomach cramping.\"  S/S lasted about 30 minutes but resolved now.  She has taken meds up until now w/o issues.  Awaiting a call back with orders but aware to seek immediate care for return of s/s.   Does the patient have all medications ordered at discharge? Yes   Is the patient taking all medications as directed (includes completed medication regime)? Yes   Does the patient have a primary care provider?  Yes   Comments regarding PCP Encouraged to schedule her f/u appts   Has the patient kept scheduled appointments due by today? N/A   Has home health visited the patient within 72 hours of discharge? N/A   Psychosocial issues? No   Did the patient receive a copy of their discharge instructions? Yes   Nursing interventions Reviewed instructions with patient   What is the patient's perception of their health status since discharge? New symptoms unrelated to diagnosis  [Pt denies any CP, palpitations but has concerns as noted she feels r/t meds.  This RN did review cardiac s/s and need to seek care and also encouraged to seek immediate care for return of s/s.  At time of call issues were resolved.]   Is the patient/caregiver able to teach back signs and symptoms related to disease process for when to call PCP? Yes   Is the patient/caregiver able to " teach back signs and symptoms related to disease process for when to call 911? Yes   Additional teach back comments Pt does not have a BP cuff for monitoring.  Pt reports her PCP office is usually responsive when she calls but she will return   Week 1 call completed? Yes   Call end time 1310            HARPREET SIGALA - Registered Nurse

## 2024-04-10 NOTE — TELEPHONE ENCOUNTER
PT CALLED STATING THAT SHE IS HAVING SIDE EFFECTS SINCE STARTING MULTAQ. SHE REPORTS STOMACH PAIN & SWELLING OF HER TONGUE 30 MINUTES AFTER TAKING.    I ADVISED SHE HOLD THE MULTAQ UNTIL YOU CAN ADVISE.

## 2024-05-28 NOTE — PROGRESS NOTES
Chief Complaint  Lung nodules    Subjective    History of Present Illness {CC  Problem List  Visit Diagnosis   Encounters  Notes  Medications  Labs  Result Review Imaging  Media     Tabitha Gonzalez presents to [facility] for:    History of Present Illness  Ms. Gonzalez is here for follow up and management of COPD and lung nodules.  Left upper lobe nodule on most recent CT scan in March 2024 showing stability and nodules particularly left upper lobe and right fissural nodule are stable.  She does get short of breath with exertion.  She tells me she is not to the point where she wants to try any inhalers currently.  She was hospitalized in April 2024 for anemia.  She is now following with hematology.  No respiratory illness since last visit.       Prior to Admission medications    Medication Sig Start Date End Date Taking? Authorizing Provider   atorvastatin (LIPITOR) 20 MG tablet Take 1 tablet by mouth Daily.    Ion Delacruz MD   cilostazol (PLETAL) 100 MG tablet Take 1 tablet by mouth 2 (Two) Times a Day.    Ion Delacruz MD   dronedarone (MULTAQ) 400 MG tablet Take 1 tablet by mouth Every 12 (Twelve) Hours. 4/7/24   Glen Tapia MD   ferrous gluconate (FERGON) 324 MG tablet Take 1 tablet by mouth Daily With Breakfast. 4/7/24   Glen Tapia MD   Folbic 2.5-25-2 MG tablet tablet Take 1 tablet by mouth Daily. 11/13/20   Emergency, Nurse Kate, RN   metoprolol succinate XL (TOPROL-XL) 50 MG 24 hr tablet Take 1 tablet by mouth Daily.    Ion Delacruz MD   rivaroxaban (XARELTO) 20 MG tablet Take 1 tablet by mouth Daily.    ProviderIon MD       Social History     Socioeconomic History    Marital status:    Tobacco Use    Smoking status: Former     Current packs/day: 0.00     Average packs/day: 1.5 packs/day for 10.0 years (15.0 ttl pk-yrs)     Types: Cigarettes     Quit date: 2014     Years since quitting:  "10.4     Passive exposure: Past    Smokeless tobacco: Never   Vaping Use    Vaping status: Former   Substance and Sexual Activity    Alcohol use: Yes     Comment: Rarely     Drug use: No    Sexual activity: Defer       Objective   Vital Signs:   /80   Pulse 94   Ht 175.3 cm (69\")   Wt 103 kg (227 lb)   SpO2 95% Comment: ginger  BMI 33.52 kg/m²     Physical Exam  Constitutional:       General: She is not in acute distress.  HENT:      Head: Normocephalic.      Nose: Nose normal.      Mouth/Throat:      Mouth: Mucous membranes are moist.   Eyes:      General: No scleral icterus.  Cardiovascular:      Rate and Rhythm: Normal rate.   Pulmonary:      Effort: No respiratory distress.   Abdominal:      General: There is no distension.   Neurological:      Mental Status: She is alert and oriented to person, place, and time.   Psychiatric:         Mood and Affect: Mood normal.         Behavior: Behavior is cooperative.        Result Review :{ Labs  Result Review  Imaging  Med Tab  Media :          Results for orders placed during the hospital encounter of 01/09/24    Complete PFT - Pre & Post Bronchodilator    Narrative  [facility] - Pulmonary Function Test    [address] [place] [address]  [address]  [phone]    Patient : Tabitha Gonzalez  MRN : [id number]  CSN : [id number]  Pulmonologist : Edwar Kat MD  Date : 1/15/2024    ______________________________________________________________________    Interpretation :  1.  Spirometry is consistent with a moderate obstructive ventilatory defect.  2.  There is improvement in spirometry postbronchodilator so that postbronchodilator spirometry is consistent with a mild obstructive ventilatory defect manifested by a decrease in midflows.  3.  Lung volumes reveal hyperinflation with an elevated expiratory reserve volume and residual volume.  4.  There is a mild diffusion impairment even when corrected for alveolar volume.    Edwar Kat, " MD               CT Chest Low Dose Follow Up Without Contrast (03/15/2024 08:05)   My interpretation of imaging: Left upper lobe nodule, unchanged.  Right fissural nodule stable, other micronodules also stable        Assessment and Plan {CC Problem List  Visit Diagnosis  ROS  Review (Popup)  Health Maintenance  Quality  BestPractice  Medications  SmartSets  SnapShot Encounters  Media      Diagnoses and all orders for this visit:    1. COPD, moderate (Primary)    2. Lung nodules  -      CT Chest Low Dose Cancer Screening WO; Future    3. Centrilobular emphysema  -     Alpha - 1 - Antitrypsin; Future    4. Smoker  -      CT Chest Low Dose Cancer Screening WO; Future    5. Personal history of nicotine dependence  -      CT Chest Low Dose Cancer Screening WO; Future          We reviewed CT chest and compared to prior showing stability of left apical lung nodule and right fissural nodule.  Plan to resume yearly low-dose lung cancer screening CT.  This will be due in March 2025.  Orders placed.  PFT showing moderate COPD.  She reports mild symptoms and is not wanting to initiate inhalers at this time.  Check alpha-1 antitrypsin in the setting of centrilobular emphysema and moderate COPD.  She quit smoking approximately 8 years ago.  Continue to abstain from smoking.  Office follow-up in 8 months or so with spirometry and to review CT.  Call sooner if needed.    Ashley Vallecillo, APRWILMA  6/10/2024  09:26 CDT    Follow Up {Instructions Charge Capture  Follow-up Communications   Return in about 8 months (around 2/10/2025) for spirometry, Review CT WITH DOC k .    Patient was given instructions and counseling regarding her condition or for health maintenance advice. Please see specific information pulled into the AVS if appropriate.

## 2024-06-10 ENCOUNTER — OFFICE VISIT (OUTPATIENT)
Dept: PULMONOLOGY | Facility: CLINIC | Age: 64
End: 2024-06-10
Payer: MEDICARE

## 2024-06-10 VITALS
BODY MASS INDEX: 33.62 KG/M2 | OXYGEN SATURATION: 95 % | DIASTOLIC BLOOD PRESSURE: 80 MMHG | HEART RATE: 94 BPM | SYSTOLIC BLOOD PRESSURE: 128 MMHG | HEIGHT: 69 IN | WEIGHT: 227 LBS

## 2024-06-10 DIAGNOSIS — F17.200 SMOKER: ICD-10-CM

## 2024-06-10 DIAGNOSIS — J43.2 CENTRILOBULAR EMPHYSEMA: Chronic | ICD-10-CM

## 2024-06-10 DIAGNOSIS — J44.9 COPD, MODERATE: Primary | Chronic | ICD-10-CM

## 2024-06-10 DIAGNOSIS — R91.8 LUNG NODULES: ICD-10-CM

## 2024-06-10 DIAGNOSIS — Z87.891 PERSONAL HISTORY OF NICOTINE DEPENDENCE: ICD-10-CM

## 2024-06-10 PROCEDURE — 3079F DIAST BP 80-89 MM HG: CPT | Performed by: NURSE PRACTITIONER

## 2024-06-10 PROCEDURE — 99214 OFFICE O/P EST MOD 30 MIN: CPT | Performed by: NURSE PRACTITIONER

## 2024-06-10 PROCEDURE — 3074F SYST BP LT 130 MM HG: CPT | Performed by: NURSE PRACTITIONER

## 2024-07-01 DIAGNOSIS — D64.9 ANEMIA, UNSPECIFIED TYPE: Primary | ICD-10-CM

## 2024-07-05 ENCOUNTER — TELEPHONE (OUTPATIENT)
Dept: PULMONOLOGY | Facility: CLINIC | Age: 64
End: 2024-07-05
Payer: MEDICARE

## 2024-07-30 ENCOUNTER — TELEPHONE (OUTPATIENT)
Dept: GASTROENTEROLOGY | Facility: CLINIC | Age: 64
End: 2024-07-30
Payer: MEDICARE

## 2024-07-30 NOTE — TELEPHONE ENCOUNTER
PT IS DUE FOR A REPEAT COLONOSCOPY. SHE DID HAVE AN OV IN MARCH 2024. THE RECALL STATES TO CALL AND CHECK TO SEE IF PT IS READY TO SCHEDULE SCOPE.     CAN YOU PLEASE CALL THE PT TO SCHEDULE?

## 2024-07-31 ENCOUNTER — PREP FOR SURGERY (OUTPATIENT)
Dept: OTHER | Facility: HOSPITAL | Age: 64
End: 2024-07-31
Payer: MEDICARE

## 2024-07-31 ENCOUNTER — TELEPHONE (OUTPATIENT)
Dept: GASTROENTEROLOGY | Facility: CLINIC | Age: 64
End: 2024-07-31
Payer: MEDICARE

## 2024-07-31 DIAGNOSIS — Z80.0 FAMILY HISTORY OF RECTAL CANCER: ICD-10-CM

## 2024-07-31 DIAGNOSIS — Z86.010 HISTORY OF ADENOMATOUS POLYP OF COLON: Primary | ICD-10-CM

## 2024-08-19 ENCOUNTER — TELEPHONE (OUTPATIENT)
Dept: GASTROENTEROLOGY | Facility: CLINIC | Age: 64
End: 2024-08-19
Payer: MEDICARE

## 2024-08-19 NOTE — TELEPHONE ENCOUNTER
PT called stating that she can't drink the jug.   She vomits it up.  She would like another prep :)

## 2024-08-21 ENCOUNTER — TELEPHONE (OUTPATIENT)
Dept: GASTROENTEROLOGY | Facility: CLINIC | Age: 64
End: 2024-08-21
Payer: MEDICARE

## 2024-08-21 PROBLEM — Z80.0 FAMILY HISTORY OF RECTAL CANCER: Status: ACTIVE | Noted: 2024-07-31

## 2024-08-21 NOTE — TELEPHONE ENCOUNTER
PT just called and said she can't drink the gallon.  She needs another prep. She in on the schedule for tomorrow.

## 2024-08-22 ENCOUNTER — ANESTHESIA (OUTPATIENT)
Dept: GASTROENTEROLOGY | Facility: HOSPITAL | Age: 64
End: 2024-08-22
Payer: MEDICARE

## 2024-08-22 ENCOUNTER — HOSPITAL ENCOUNTER (OUTPATIENT)
Facility: HOSPITAL | Age: 64
Setting detail: HOSPITAL OUTPATIENT SURGERY
Discharge: HOME | End: 2024-08-22
Attending: INTERNAL MEDICINE | Admitting: INTERNAL MEDICINE
Payer: MEDICARE

## 2024-08-22 ENCOUNTER — ANESTHESIA EVENT (OUTPATIENT)
Dept: GASTROENTEROLOGY | Facility: HOSPITAL | Age: 64
End: 2024-08-22
Payer: MEDICARE

## 2024-08-22 VITALS
DIASTOLIC BLOOD PRESSURE: 58 MMHG | TEMPERATURE: 96.9 F | HEIGHT: 68 IN | HEART RATE: 76 BPM | RESPIRATION RATE: 24 BRPM | OXYGEN SATURATION: 94 % | BODY MASS INDEX: 34.25 KG/M2 | SYSTOLIC BLOOD PRESSURE: 110 MMHG | WEIGHT: 226 LBS

## 2024-08-22 DIAGNOSIS — Z86.010 HISTORY OF ADENOMATOUS POLYP OF COLON: ICD-10-CM

## 2024-08-22 DIAGNOSIS — Z80.0 FAMILY HISTORY OF RECTAL CANCER: ICD-10-CM

## 2024-08-22 PROCEDURE — 88305 TISSUE EXAM BY PATHOLOGIST: CPT | Performed by: INTERNAL MEDICINE

## 2024-08-22 PROCEDURE — 25810000003 SODIUM CHLORIDE 0.9 % SOLUTION: Performed by: ANESTHESIOLOGY

## 2024-08-22 PROCEDURE — 45385 COLONOSCOPY W/LESION REMOVAL: CPT | Performed by: INTERNAL MEDICINE

## 2024-08-22 PROCEDURE — 25010000002 PROPOFOL 10 MG/ML EMULSION: Performed by: NURSE ANESTHETIST, CERTIFIED REGISTERED

## 2024-08-22 RX ORDER — SODIUM CHLORIDE 0.9 % (FLUSH) 0.9 %
10 SYRINGE (ML) INJECTION AS NEEDED
Status: DISCONTINUED | OUTPATIENT
Start: 2024-08-22 | End: 2024-08-22 | Stop reason: HOSPADM

## 2024-08-22 RX ORDER — SODIUM CHLORIDE 9 MG/ML
1000 INJECTION, SOLUTION INTRAVENOUS CONTINUOUS
Status: DISCONTINUED | OUTPATIENT
Start: 2024-08-22 | End: 2024-08-22 | Stop reason: HOSPADM

## 2024-08-22 RX ORDER — LIDOCAINE HYDROCHLORIDE 10 MG/ML
0.5 INJECTION, SOLUTION EPIDURAL; INFILTRATION; INTRACAUDAL; PERINEURAL ONCE AS NEEDED
Status: DISCONTINUED | OUTPATIENT
Start: 2024-08-22 | End: 2024-08-22 | Stop reason: HOSPADM

## 2024-08-22 RX ORDER — PROPOFOL 10 MG/ML
VIAL (ML) INTRAVENOUS AS NEEDED
Status: DISCONTINUED | OUTPATIENT
Start: 2024-08-22 | End: 2024-08-22 | Stop reason: SURG

## 2024-08-22 RX ORDER — ONDANSETRON 2 MG/ML
4 INJECTION INTRAMUSCULAR; INTRAVENOUS ONCE AS NEEDED
Status: DISCONTINUED | OUTPATIENT
Start: 2024-08-22 | End: 2024-08-22 | Stop reason: HOSPADM

## 2024-08-22 RX ADMIN — PROPOFOL 100 MG: 10 INJECTION, EMULSION INTRAVENOUS at 08:34

## 2024-08-22 RX ADMIN — PROPOFOL 100 MG: 10 INJECTION, EMULSION INTRAVENOUS at 08:23

## 2024-08-22 RX ADMIN — SODIUM CHLORIDE 1000 ML: 900 INJECTION, SOLUTION INTRAVENOUS at 08:18

## 2024-08-22 NOTE — ANESTHESIA PREPROCEDURE EVALUATION
Anesthesia Evaluation     Nursing notes reviewed   history of anesthetic complications:  PONV  NPO Solid Status: > 8 hours             Airway   Mallampati: II  TM distance: >3 FB  Neck ROM: full  no difficulty expected  Dental    (+) lower dentures and upper dentures    Pulmonary    (+) a smoker Former, COPD,  Cardiovascular     PT is on anticoagulation therapy    (+) hypertension well controlled less than 2 medications, dysrhythmias Tachycardia, PVD, DVT resolved, hyperlipidemia  (-) past MI, cardiac stents      Neuro/Psych  (+) headaches  (-) seizures, TIA, CVA  GI/Hepatic/Renal/Endo    (+) obesity, GERD poorly controlled  (-) liver disease, no renal disease, diabetes    Musculoskeletal (-) negative ROS    Abdominal   (+) obese   Substance History - negative use     OB/GYN negative ob/gyn ROS         Other - negative ROS                     Anesthesia Plan    ASA 3     MAC     intravenous induction     Anesthetic plan, risks, benefits, and alternatives have been provided, discussed and informed consent has been obtained with: patient.    Plan discussed with CRNA.

## 2024-08-22 NOTE — H&P
[facility] Gastroenterology  Pre Procedure History & Physical    Chief Complaint:   Colon polyps    Subjective     HPI:   The patient has a history of colon polyps who presents for exam.    Past Medical History:   Past Medical History:   Diagnosis Date    AVM (arteriovenous malformation) of colon     Colon polyp     COPD (chronic obstructive pulmonary disease)     DVT (deep venous thrombosis)     Family history of colon cancer     Family history of colonic polyps     GERD (gastroesophageal reflux disease)     History of adenomatous polyp of colon     History of transfusion     after vascular surgery    Hypercholesterolemia     Hypertension     IBS (irritable bowel syndrome)     Migraine     Peripheral artery disease     PONV (postoperative nausea and vomiting)     PVD (peripheral vascular disease)        Past Surgical History:  Past Surgical History:   Procedure Laterality Date    AORTA FEMORAL BYPASS      APPENDECTOMY      CHOLECYSTECTOMY      COLONOSCOPY  10/28/2011    COLONOSCOPY N/A 6/19/2017    Procedure: COLONOSCOPY WITH ANESTHESIA;  Surgeon: Zack Do MD;  Location: [facility] ENDOSCOPY;  Service:     COLONOSCOPY N/A 12/3/2020    Procedure: COLONOSCOPY WITH ANESTHESIA;  Surgeon: Zack Do MD;  Location: [facility] ENDOSCOPY;  Service: Gastroenterology;  Laterality: N/A;  pre hx adenomatous colon polyp, family hx colon ca  post polyps  Nikolai Treadwell MD    ENDOSCOPY  01/26/2007    ENDOSCOPY N/A 6/19/2017    Procedure: ESOPHAGOGASTRODUODENOSCOPY WITH ANESTHESIA;  Surgeon: Zack Do MD;  Location: [facility] ENDOSCOPY;  Service:     HYSTERECTOMY      PERIPHERAL ARTERIAL STENT GRAFT         Family History:  Family History   Problem Relation Age of Onset    Coronary artery disease Mother     Cancer Father         lung    Rectal cancer Sister         In her 50's    Colon cancer Sister         rectal cancer in her 50 's    Stomach cancer Brother     Esophageal cancer Neg Hx     Liver cancer Neg  "Hx     Liver disease Neg Hx        Social History:   reports that she quit smoking about 10 years ago. Her smoking use included cigarettes. She has a 15 pack-year smoking history. She has been exposed to tobacco smoke. She has never used smokeless tobacco. She reports current alcohol use. She reports that she does not use drugs.    Medications:   Prior to Admission medications    Medication Sig Start Date End Date Taking? Authorizing Provider   atorvastatin (LIPITOR) 20 MG tablet Take 1 tablet by mouth Daily.   Yes ProviderIon MD   dronedarone (MULTAQ) 400 MG tablet Take 1 tablet by mouth Every 12 (Twelve) Hours. 4/7/24  Yes Glen Tapia MD   metoprolol succinate XL (TOPROL-XL) 50 MG 24 hr tablet Take 1 tablet by mouth Daily.   Yes ProviderIon MD   cilostazol (PLETAL) 100 MG tablet Take 1 tablet by mouth 2 (Two) Times a Day.    ProviderIon MD   ferrous gluconate (FERGON) 324 MG tablet Take 1 tablet by mouth Daily With Breakfast. 4/7/24   Glen Tapia MD   Folbic 2.5-25-2 MG tablet tablet Take 1 tablet by mouth Daily. 11/13/20   Emergency, Nurse Kate, RN   rivaroxaban (XARELTO) 20 MG tablet Take 1 tablet by mouth Daily.    ProviderIon MD       Allergies:  Contrast dye (echo or unknown ct/mr), Azithromycin, Codeine, Penicillin g sodium, and Sulfa antibiotics    ROS:    General: Weight stable  Resp: No SOA  Cardiovascular: No CP    Objective     Blood pressure 147/76, pulse 76, temperature 96.9 °F (36.1 °C), resp. rate 20, height 172.7 cm (68\"), weight 103 kg (226 lb), SpO2 94%, not currently breastfeeding.    Physical Exam   Constitutional: Pt is oriented to person, place, and in no distress.   Cardiovascular: Normal rate, regular rhythm.    Pulmonary/Chest: Effort normal. No respiratory distress.   Abdominal:  Non-distended.  Psychiatric: Mood, memory, affect and judgment appear normal.     Assessment & Plan     Diagnosis:  Colon polyps    Anticipated Surgical " Procedure:  Colonoscopy    The risks, benefits, and alternatives of this procedure have been discussed with the patient or the responsible party- the patient understands and agrees to proceed.      EMR Dragon/transcription disclaimer:  Much of this encounter note is electronic transcription/translation of spoken language to printed text.  The electronic translation of spoken language may be erroneous, or at times, nonsensical words or phrases may be inadvertently transcribed.  Although I have reviewed the note for such errors, some may still exist.

## 2024-08-22 NOTE — ANESTHESIA POSTPROCEDURE EVALUATION
Patient: Tabitha Gonzalez    Procedure Summary       Date: 08/22/24 Room / Location: [facility] ENDOSCOPY 4 / [facility] ENDOSCOPY    Anesthesia Start: 0821 Anesthesia Stop: 0841    Procedure: COLONOSCOPY WITH ANESTHESIA Diagnosis:       History of adenomatous polyp of colon      Family history of rectal cancer      (History of adenomatous polyp of colon [Z86.010])      (Family history of rectal cancer [Z80.0])    Surgeons: Zack Do MD Provider: Tapan Shaw CRNA    Anesthesia Type: MAC ASA Status: 3            Anesthesia Type: MAC    Vitals  Vitals Value Taken Time   BP     Temp     Pulse 68 08/22/24 0841   Resp     SpO2 96 % 08/22/24 0841   Vitals shown include unfiled device data.        Post Anesthesia Care and Evaluation    Patient location during evaluation: PHASE II  Patient participation: complete - patient participated  Level of consciousness: awake and alert  Pain score: 0  Pain management: adequate    Airway patency: patent  Anesthetic complications: No anesthetic complications  PONV Status: none  Cardiovascular status: acceptable and stable  Respiratory status: acceptable  Hydration status: acceptable

## 2024-08-23 LAB
CYTO UR: NORMAL
LAB AP CASE REPORT: NORMAL
Lab: NORMAL
PATH REPORT.FINAL DX SPEC: NORMAL
PATH REPORT.GROSS SPEC: NORMAL

## 2024-10-07 DIAGNOSIS — I73.9 PVD (PERIPHERAL VASCULAR DISEASE) WITH CLAUDICATION: Primary | ICD-10-CM

## 2024-10-07 DIAGNOSIS — I65.23 BILATERAL CAROTID ARTERY STENOSIS: ICD-10-CM

## 2024-12-04 ENCOUNTER — TELEPHONE (OUTPATIENT)
Dept: VASCULAR SURGERY | Facility: CLINIC | Age: 64
End: 2024-12-04
Payer: MEDICARE

## 2024-12-04 NOTE — TELEPHONE ENCOUNTER
Call placed to patient and reminded of testing and appointment on 12/05/2024 and she has voiced understanding

## 2024-12-05 ENCOUNTER — HOSPITAL ENCOUNTER (OUTPATIENT)
Dept: ULTRASOUND IMAGING | Facility: HOSPITAL | Age: 64
Discharge: HOME | End: 2024-12-05
Payer: MEDICARE

## 2024-12-05 ENCOUNTER — OFFICE VISIT (OUTPATIENT)
Dept: VASCULAR SURGERY | Facility: CLINIC | Age: 64
End: 2024-12-05
Payer: MEDICARE

## 2024-12-05 VITALS
DIASTOLIC BLOOD PRESSURE: 78 MMHG | SYSTOLIC BLOOD PRESSURE: 136 MMHG | BODY MASS INDEX: 34.4 KG/M2 | WEIGHT: 227 LBS | OXYGEN SATURATION: 98 % | HEART RATE: 108 BPM | HEIGHT: 68 IN

## 2024-12-05 DIAGNOSIS — I10 PRIMARY HYPERTENSION: ICD-10-CM

## 2024-12-05 DIAGNOSIS — I73.9 PVD (PERIPHERAL VASCULAR DISEASE) WITH CLAUDICATION: ICD-10-CM

## 2024-12-05 DIAGNOSIS — I65.23 BILATERAL CAROTID ARTERY STENOSIS: ICD-10-CM

## 2024-12-05 DIAGNOSIS — E78.00 HYPERCHOLESTEROLEMIA: ICD-10-CM

## 2024-12-05 DIAGNOSIS — I73.9 PVD (PERIPHERAL VASCULAR DISEASE) WITH CLAUDICATION: Primary | ICD-10-CM

## 2024-12-05 PROCEDURE — 3078F DIAST BP <80 MM HG: CPT | Performed by: NURSE PRACTITIONER

## 2024-12-05 PROCEDURE — 1159F MED LIST DOCD IN RCRD: CPT | Performed by: NURSE PRACTITIONER

## 2024-12-05 PROCEDURE — 1160F RVW MEDS BY RX/DR IN RCRD: CPT | Performed by: NURSE PRACTITIONER

## 2024-12-05 PROCEDURE — 3075F SYST BP GE 130 - 139MM HG: CPT | Performed by: NURSE PRACTITIONER

## 2024-12-05 PROCEDURE — 93923 UPR/LXTR ART STDY 3+ LVLS: CPT

## 2024-12-05 PROCEDURE — 99214 OFFICE O/P EST MOD 30 MIN: CPT | Performed by: NURSE PRACTITIONER

## 2024-12-05 PROCEDURE — 93880 EXTRACRANIAL BILAT STUDY: CPT

## 2024-12-05 NOTE — PROGRESS NOTES
"12/05/2024       Nikolai Treadwell MD  [address] [place] [address]        Tabitha Gonzalez  1960    Chief Complaint   Patient presents with    SET PAD     Testing done here this am    bilateral carotid artery stenosis     No complaints and no stroke symptoms.       Dear Nikolai Treadwell MD:    HPI     I had the pleasure of seeing you patient in the office today for follow up.  As you recall, the patient is a 64 y.o. female who has a longstanding history of PAD.  She has previously undergone aortobifemoral bypass with lower extremity stenting by Dr. Clark.  Currently she is doing well and denies any claudication to her lower extremities or strokelike symptoms.  Currently she is doing well and denies any strokelike symptoms.  She is maintained on Xarelto, Pletal, and Lipitor.  She did have noninvasive testing performed today, which I did review in office.       Review of Systems   Constitutional: Negative.    HENT: Negative.     Eyes: Negative.    Respiratory: Negative.     Cardiovascular: Negative.    Gastrointestinal: Negative.    Endocrine: Negative.    Genitourinary: Negative.    Musculoskeletal:  Positive for arthralgias.   Skin: Negative.    Allergic/Immunologic: Negative.    Neurological: Negative.    Hematological: Negative.    Psychiatric/Behavioral: Negative.          /78   Pulse 108   Ht 172.7 cm (68\")   Wt 103 kg (227 lb)   LMP  (LMP Unknown)   SpO2 98%   BMI 34.52 kg/m²    Physical Exam  Vitals and nursing note reviewed.   Constitutional:       Appearance: Normal appearance. She is well-developed. She is obese.   HENT:      Head: Normocephalic and atraumatic.   Eyes:      General: No scleral icterus.     Pupils: Pupils are equal, round, and reactive to light.   Neck:      Thyroid: No thyromegaly.      Vascular: No carotid bruit or JVD.   Cardiovascular:      Rate and Rhythm: Normal rate and regular rhythm.      Pulses:           Carotid pulses are 2+ on the right side and 2+ on " the left side.       Femoral pulses are 2+ on the right side and 2+ on the left side.       Popliteal pulses are 2+ on the right side and 2+ on the left side.        Dorsalis pedis pulses are 2+ on the right side and 2+ on the left side.        Posterior tibial pulses are detected w/ Doppler on the right side and 2+ on the left side.      Heart sounds: Normal heart sounds.   Pulmonary:      Effort: Pulmonary effort is normal.      Breath sounds: Normal breath sounds.   Abdominal:      General: Bowel sounds are normal. There is no distension or abdominal bruit.      Palpations: Abdomen is soft. There is no mass.      Tenderness: There is no abdominal tenderness.   Musculoskeletal:         General: Normal range of motion.      Cervical back: Normal range of motion and neck supple.   Lymphadenopathy:      Cervical: No cervical adenopathy.   Skin:     General: Skin is warm and dry.   Neurological:      Mental Status: She is alert and oriented to person, place, and time.      Cranial Nerves: No cranial nerve deficit.      Sensory: No sensory deficit.   Psychiatric:         Mood and Affect: Mood normal.         Behavior: Behavior normal.         Thought Content: Thought content normal.         Judgment: Judgment normal.       Diagnostic Data:  US Carotid Bilateral    Result Date: 12/5/2024  Narrative: History: Carotid occlusive disease      Impression: Impression: 1. There is less than 50% stenosis of the right internal carotid artery. 2. There is less than 50% stenosis of the left internal carotid artery. 3. Antegrade flow is demonstrated in bilateral vertebral arteries.  Comments: Bilateral carotid vertebral arterial duplex scan was performed.  Grayscale imaging shows intimal thickening and calcified elements at the carotid bifurcation. The right internal carotid artery peak systolic velocity is 66.9 cm/sec. The end-diastolic velocity is 20.6 cm/sec. The right ICA/CCA ratio is approximately 1.2. These findings correlate  with less than 50% stenosis of the right internal carotid artery.  Grayscale imaging shows intimal thickening and calcified elements at the carotid bifurcation. The left internal carotid artery peak systolic velocity is 64.2 cm/sec. The end-diastolic velocity is 22.7 cm/sec. The left ICA/CCA ratio is approximately 0.9. These findings correlate with less than 50% stenosis of the left internal carotid artery.  Antegrade flow is demonstrated in bilateral vertebral arteries.   This report was signed and finalized on 12/5/2024 2:01 PM by Dr. Tony Young MD.      US Ankle / Brachial Indices Extremity Complete    Result Date: 12/5/2024  Narrative:  History: PAD  Comments: Bilateral lower extremity arterial with multi-level pulse volume recordings and segmental pressures were performed at rest and stress.  The right ankle/brachial index is 0.93. The waveforms are biphasic without dampening. These findings are consistent with mild arterial insufficiency of the right lower extremity at rest.  The left ankle/brachial index is 0.92. The waveforms are biphasic without dampening. These findings are consistent with mild arterial insufficiency of the left lower extremity at rest.      Impression: Impression: 1. Mild arterial insufficiency of the right lower extremity at rest. 2. Mild arterial insufficiency of the left lower extremity at rest.    This report was signed and finalized on 12/5/2024 1:52 PM by Dr. Tony Young MD.            Patient Active Problem List   Diagnosis    COPD, moderate    History of adenomatous polyp of colon    Chronic constipation    Family history of colon cancer    Epigastric pain    PVD (peripheral vascular disease) with claudication    Hypertension    Hypercholesterolemia    DVT (deep venous thrombosis)    BMI 30.0-30.9,adult    Anemia    Palpitations    Chest discomfort    Chest pain    Family history of rectal cancer         ICD-10-CM ICD-9-CM   1. PVD (peripheral vascular disease) with  claudication  I73.9 443.9   2. Bilateral carotid artery stenosis  I65.23 433.10     433.30   3. Primary hypertension  I10 401.9   4. Hypercholesterolemia  E78.00 272.0   5. BMI 34.0-34.9,adult  Z68.34 V85.34         PLAN: After thoroughly evaluating Tabitha Gonzalez, I believe the best course of action is to remain conservative from vascular surgery standpoint.  Currently she is doing well and denies any claudication to her lower extremities.  She also denies any strokelike symptoms.  I did review her testing which shows mild arterial insufficiency to bilateral lower extremities.  Her carotid duplex shows less than 50% carotid stenosis bilaterally.  We will see her back in 1 year with repeat noninvasive testing including carotid duplex and ABIs.  I did discuss vascular risk factors as they pertain to the progression of vascular disease including controlling her hypertension and cholesterol.  Her blood pressures stable on her current medication.  She should continue her Lipitor 20 mg daily and Pletal 100 mg daily in addition to her other medications.  Body mass index is 34.52 kg/m². BMI is >= 30 and <35. (Class 1 Obesity). The following options were offered after discussion;: exercise counseling/recommendations and nutrition counseling/recommendations This was all discussed in full with complete understanding.  Thank you for allowing me to participate in the care of your patient.  Please do not hesitate to call with any questions or concerns.  We will keep you aware of any further encounters with Tabitha Gonzalez.      Sincerely Yours,        SHYLA Pal

## 2024-12-05 NOTE — LETTER
"December 7, 2024     Nikolai Treadwell MD  [address] [place] [address]    Patient: Tabitha Gonzalez   YOB: 1960   Date of Visit: 12/5/2024     Dear Nikolai Treadwell MD:       Thank you for referring Tabitha Gonzalez to me for evaluation. Below are the relevant portions of my assessment and plan of care.    If you have questions, please do not hesitate to call me. I look forward to following Tabitha along with you.         Sincerely,        SHYLA Pal        CC: No Recipients    Carin Reyes APRN  12/07/24 1913  Sign when Signing Visit  12/05/2024       Nikolai Treadwell MD  [address] [place] [address]        Tabitha Gonzalez  1960    Chief Complaint   Patient presents with   • SET PAD     Testing done here this am   • bilateral carotid artery stenosis     No complaints and no stroke symptoms.       Dear Nikolai Treadwell MD:    HPI     I had the pleasure of seeing you patient in the office today for follow up.  As you recall, the patient is a 64 y.o. female who has a longstanding history of PAD.  She has previously undergone aortobifemoral bypass with lower extremity stenting by Dr. Clark.  Currently she is doing well and denies any claudication to her lower extremities or strokelike symptoms.  Currently she is doing well and denies any strokelike symptoms.  She is maintained on Xarelto, Pletal, and Lipitor.  She did have noninvasive testing performed today, which I did review in office.       Review of Systems   Constitutional: Negative.    HENT: Negative.     Eyes: Negative.    Respiratory: Negative.     Cardiovascular: Negative.    Gastrointestinal: Negative.    Endocrine: Negative.    Genitourinary: Negative.    Musculoskeletal:  Positive for arthralgias.   Skin: Negative.    Allergic/Immunologic: Negative.    Neurological: Negative.    Hematological: Negative.    Psychiatric/Behavioral: Negative.          /78   Pulse 108   Ht 172.7 cm (68\")   " Wt 103 kg (227 lb)   LMP  (LMP Unknown)   SpO2 98%   BMI 34.52 kg/m²    Physical Exam  Vitals and nursing note reviewed.   Constitutional:       Appearance: Normal appearance. She is well-developed. She is obese.   HENT:      Head: Normocephalic and atraumatic.   Eyes:      General: No scleral icterus.     Pupils: Pupils are equal, round, and reactive to light.   Neck:      Thyroid: No thyromegaly.      Vascular: No carotid bruit or JVD.   Cardiovascular:      Rate and Rhythm: Normal rate and regular rhythm.      Pulses:           Carotid pulses are 2+ on the right side and 2+ on the left side.       Femoral pulses are 2+ on the right side and 2+ on the left side.       Popliteal pulses are 2+ on the right side and 2+ on the left side.        Dorsalis pedis pulses are 2+ on the right side and 2+ on the left side.        Posterior tibial pulses are detected w/ Doppler on the right side and 2+ on the left side.      Heart sounds: Normal heart sounds.   Pulmonary:      Effort: Pulmonary effort is normal.      Breath sounds: Normal breath sounds.   Abdominal:      General: Bowel sounds are normal. There is no distension or abdominal bruit.      Palpations: Abdomen is soft. There is no mass.      Tenderness: There is no abdominal tenderness.   Musculoskeletal:         General: Normal range of motion.      Cervical back: Normal range of motion and neck supple.   Lymphadenopathy:      Cervical: No cervical adenopathy.   Skin:     General: Skin is warm and dry.   Neurological:      Mental Status: She is alert and oriented to person, place, and time.      Cranial Nerves: No cranial nerve deficit.      Sensory: No sensory deficit.   Psychiatric:         Mood and Affect: Mood normal.         Behavior: Behavior normal.         Thought Content: Thought content normal.         Judgment: Judgment normal.       Diagnostic Data:  US Carotid Bilateral    Result Date: 12/5/2024  Narrative: History: Carotid occlusive disease       Impression: Impression: 1. There is less than 50% stenosis of the right internal carotid artery. 2. There is less than 50% stenosis of the left internal carotid artery. 3. Antegrade flow is demonstrated in bilateral vertebral arteries.  Comments: Bilateral carotid vertebral arterial duplex scan was performed.  Grayscale imaging shows intimal thickening and calcified elements at the carotid bifurcation. The right internal carotid artery peak systolic velocity is 66.9 cm/sec. The end-diastolic velocity is 20.6 cm/sec. The right ICA/CCA ratio is approximately 1.2. These findings correlate with less than 50% stenosis of the right internal carotid artery.  Grayscale imaging shows intimal thickening and calcified elements at the carotid bifurcation. The left internal carotid artery peak systolic velocity is 64.2 cm/sec. The end-diastolic velocity is 22.7 cm/sec. The left ICA/CCA ratio is approximately 0.9. These findings correlate with less than 50% stenosis of the left internal carotid artery.  Antegrade flow is demonstrated in bilateral vertebral arteries.   This report was signed and finalized on 12/5/2024 2:01 PM by Dr. Tony Young MD.      US Ankle / Brachial Indices Extremity Complete    Result Date: 12/5/2024  Narrative:  History: PAD  Comments: Bilateral lower extremity arterial with multi-level pulse volume recordings and segmental pressures were performed at rest and stress.  The right ankle/brachial index is 0.93. The waveforms are biphasic without dampening. These findings are consistent with mild arterial insufficiency of the right lower extremity at rest.  The left ankle/brachial index is 0.92. The waveforms are biphasic without dampening. These findings are consistent with mild arterial insufficiency of the left lower extremity at rest.      Impression: Impression: 1. Mild arterial insufficiency of the right lower extremity at rest. 2. Mild arterial insufficiency of the left lower extremity at  rest.    This report was signed and finalized on 12/5/2024 1:52 PM by Dr. Tony Young MD.            Patient Active Problem List   Diagnosis   • COPD, moderate   • History of adenomatous polyp of colon   • Chronic constipation   • Family history of colon cancer   • Epigastric pain   • PVD (peripheral vascular disease) with claudication   • Hypertension   • Hypercholesterolemia   • DVT (deep venous thrombosis)   • BMI 30.0-30.9,adult   • Anemia   • Palpitations   • Chest discomfort   • Chest pain   • Family history of rectal cancer         ICD-10-CM ICD-9-CM   1. PVD (peripheral vascular disease) with claudication  I73.9 443.9   2. Bilateral carotid artery stenosis  I65.23 433.10     433.30   3. Primary hypertension  I10 401.9   4. Hypercholesterolemia  E78.00 272.0   5. BMI 34.0-34.9,adult  Z68.34 V85.34         PLAN: After thoroughly evaluating Tabitha Gonzalez, I believe the best course of action is to remain conservative from vascular surgery standpoint.  Currently she is doing well and denies any claudication to her lower extremities.  She also denies any strokelike symptoms.  I did review her testing which shows mild arterial insufficiency to bilateral lower extremities.  Her carotid duplex shows less than 50% carotid stenosis bilaterally.  We will see her back in 1 year with repeat noninvasive testing including carotid duplex and ABIs.  I did discuss vascular risk factors as they pertain to the progression of vascular disease including controlling her hypertension and cholesterol.  Her blood pressures stable on her current medication.  She should continue her Lipitor 20 mg daily and Pletal 100 mg daily in addition to her other medications.  Body mass index is 34.52 kg/m². BMI is >= 30 and <35. (Class 1 Obesity). The following options were offered after discussion;: exercise counseling/recommendations and nutrition counseling/recommendations This was all discussed in full with complete  understanding.  Thank you for allowing me to participate in the care of your patient.  Please do not hesitate to call with any questions or concerns.  We will keep you aware of any further encounters with Tabitha Gonzalez.      Sincerely Yours,        SHYLA Pal

## 2025-02-13 ENCOUNTER — OFFICE VISIT (OUTPATIENT)
Dept: PULMONOLOGY | Facility: CLINIC | Age: 65
End: 2025-02-13
Payer: MEDICARE

## 2025-02-13 VITALS
OXYGEN SATURATION: 94 % | BODY MASS INDEX: 34.71 KG/M2 | WEIGHT: 229 LBS | DIASTOLIC BLOOD PRESSURE: 78 MMHG | SYSTOLIC BLOOD PRESSURE: 124 MMHG | HEART RATE: 91 BPM | HEIGHT: 68 IN

## 2025-02-13 DIAGNOSIS — R91.8 LUNG NODULES: ICD-10-CM

## 2025-02-13 DIAGNOSIS — J44.9 COPD, MODERATE: Primary | ICD-10-CM

## 2025-02-13 DIAGNOSIS — J43.2 CENTRILOBULAR EMPHYSEMA: ICD-10-CM

## 2025-02-13 DIAGNOSIS — Z87.891 PERSONAL HISTORY OF NICOTINE DEPENDENCE: ICD-10-CM

## 2025-02-13 NOTE — PROGRESS NOTES
"Background:  Pt w hx obst lung disease, nodules, history of smoking   Chief Complaint  Lung nodules    Subjective    History of Present Illness     Tabitha Gonzalez is here for follow up with [facility].  History of Present Illness  She follows up with history of copd and lung nodules including ground glass nodules, history of smoking.  She has no complaints.  No exacerbations.  Her last CT scan was March of last year with 1 year follow-up recommended.  COPD/asthmatic symptoms are worse in the summertime.     Tobacco Use: Medium Risk (2/13/2025)    Patient History     Smoking Tobacco Use: Former     Smokeless Tobacco Use: Never     Passive Exposure: Past      Current Outpatient Medications   Medication Instructions    atorvastatin (LIPITOR) 20 mg, Daily    cilostazol (PLETAL) 100 mg, 2 Times Daily    dronedarone (MULTAQ) 400 mg, Oral, Every 12 Hours Scheduled    ferrous gluconate (FERGON) 324 mg, Oral, Daily With Breakfast    Folbic 2.5-25-2 MG tablet tablet 1 tablet, Daily    metoprolol succinate XL (TOPROL-XL) 50 mg, Daily    rivaroxaban (XARELTO) 20 mg, Daily      Objective     Vital Signs:   /78   Pulse 91   Ht 172.7 cm (68\")   Wt 104 kg (229 lb)   SpO2 94% Comment: RA  BMI 34.82 kg/m²   Physical Exam  Constitutional:       General: She is not in acute distress.     Appearance: She is well-developed. She is not ill-appearing or toxic-appearing.   HENT:      Head: Atraumatic.   Eyes:      General: No scleral icterus.     Conjunctiva/sclera: Conjunctivae normal.   Cardiovascular:      Rate and Rhythm: Normal rate and regular rhythm.      Heart sounds: S1 normal and S2 normal.   Pulmonary:      Effort: Pulmonary effort is normal.      Breath sounds: Normal breath sounds.   Abdominal:      General: There is no distension.   Musculoskeletal:         General: No deformity.      Cervical back: Neck supple.   Skin:     Coloration: Skin is not pale.      " Findings: No rash.   Neurological:      Mental Status: She is alert.      Result Review  Data Reviewed:         PFT Values          2/13/2025    09:00   Pre Drug PFT Results   FVC 92   FEV1 78   FEF 25-75% 45   FEV1/FVC 66           CT Chest Low Dose Follow Up Without Contrast (03/15/2024 08:05) stable     Assessment and Plan    Diagnoses and all orders for this visit:    1. COPD, moderate (Primary)  -     Spirometry    2. Centrilobular emphysema    3. Lung nodules    4. Personal history of nicotine dependence    Continue annual screening ct for next 5 years or unless guidelines change.  Next ct 3/2025  Continue inhalers. No changes for now  I think she has asthma/COPD overlap given the reversibility on hospital PFTs from last year.  Continue non-smoking.    Follow Up   Return in about 1 year (around 2/13/2026) for spirometry.  Patient was given instructions and counseling regarding her condition or for health maintenance advice. Please see specific information pulled into the AVS if appropriate.    Electronically signed by Vamshi Harris MD, 2/13/2025, 09:20 CST

## 2025-02-13 NOTE — PROCEDURES
Spirometry    Performed by: Natalie Mustafa, RRT  Authorized by: Vamshi Harris MD     Pre Drug % Predicted    FVC: 92%   FEV1: 78%   FEF 25-75%: 45%   FEV1/FVC: 66%    Interpretation   Spirometry   Spirometry shows moderate obstruction.   Overall comments: Table findings with comparison to hospital study last year.  Notably the hospital study showed significant reversibility with moderate obstruction converting to mild obstruction postbronchodilator  Electronically signed by Vamshi Harris MD, 2/13/2025, 09:22 CST

## 2025-03-28 ENCOUNTER — RESULTS FOLLOW-UP (OUTPATIENT)
Dept: PULMONOLOGY | Facility: CLINIC | Age: 65
End: 2025-03-28
Payer: MEDICARE

## 2025-03-28 ENCOUNTER — HOSPITAL ENCOUNTER (OUTPATIENT)
Dept: CT IMAGING | Facility: HOSPITAL | Age: 65
Discharge: HOME | End: 2025-03-28
Payer: MEDICARE

## 2025-03-28 DIAGNOSIS — R91.8 LUNG NODULES: ICD-10-CM

## 2025-03-28 DIAGNOSIS — F17.200 SMOKER: ICD-10-CM

## 2025-03-28 DIAGNOSIS — Z87.891 PERSONAL HISTORY OF NICOTINE DEPENDENCE: ICD-10-CM

## 2025-03-28 PROCEDURE — 71271 CT THORAX LUNG CANCER SCR C-: CPT

## 2025-07-20 ENCOUNTER — HOSPITAL ENCOUNTER (EMERGENCY)
Age: 65
Discharge: HOME | End: 2025-07-20
Attending: STUDENT IN AN ORGANIZED HEALTH CARE EDUCATION/TRAINING PROGRAM
Payer: MEDICARE

## 2025-07-20 VITALS
BODY MASS INDEX: 29.53 KG/M2 | WEIGHT: 200 LBS | HEART RATE: 91 BPM | RESPIRATION RATE: 16 BRPM | SYSTOLIC BLOOD PRESSURE: 142 MMHG | TEMPERATURE: 97.7 F | OXYGEN SATURATION: 94 % | DIASTOLIC BLOOD PRESSURE: 89 MMHG

## 2025-07-20 DIAGNOSIS — R30.0 DYSURIA: Primary | ICD-10-CM

## 2025-07-20 DIAGNOSIS — N39.0 URINARY TRACT INFECTION WITHOUT HEMATURIA, SITE UNSPECIFIED: ICD-10-CM

## 2025-07-20 LAB
BACTERIA #/AREA URNS HPF: ABNORMAL /HPF
BILIRUB UR QL STRIP: NEGATIVE
CLARITY UR: ABNORMAL
COLOR UR: YELLOW
GLUCOSE UR STRIP.AUTO-MCNC: NEGATIVE MG/DL
HGB UR STRIP.AUTO-MCNC: ABNORMAL MG/L
HYALINE CASTS #/AREA URNS LPF: ABNORMAL /LPF (ref 0–5)
KETONES UR STRIP.AUTO-MCNC: NEGATIVE MG/DL
LEUKOCYTE ESTERASE UR QL STRIP.AUTO: ABNORMAL
NITRITE UR QL STRIP.AUTO: POSITIVE
PH UR STRIP.AUTO: 5 [PH] (ref 5–8)
PROT UR STRIP.AUTO-MCNC: 30 MG/DL
RBC #/AREA URNS HPF: ABNORMAL /HPF (ref 0–2)
SP GR UR STRIP.AUTO: 1.01 (ref 1–1.03)
SQUAMOUS #/AREA URNS HPF: ABNORMAL /HPF
UROBILINOGEN UR STRIP.AUTO-MCNC: 0.2 E.U./DL
WBC #/AREA URNS HPF: ABNORMAL /HPF (ref 0–5)
YEAST #/AREA URNS HPF: PRESENT /HPF

## 2025-07-20 PROCEDURE — 6360000002 HC RX W HCPCS: Performed by: STUDENT IN AN ORGANIZED HEALTH CARE EDUCATION/TRAINING PROGRAM

## 2025-07-20 PROCEDURE — 81001 URINALYSIS AUTO W/SCOPE: CPT

## 2025-07-20 PROCEDURE — 99284 EMERGENCY DEPT VISIT MOD MDM: CPT

## 2025-07-20 PROCEDURE — 87077 CULTURE AEROBIC IDENTIFY: CPT

## 2025-07-20 PROCEDURE — 96372 THER/PROPH/DIAG INJ SC/IM: CPT

## 2025-07-20 PROCEDURE — 87186 SC STD MICRODIL/AGAR DIL: CPT

## 2025-07-20 PROCEDURE — 87086 URINE CULTURE/COLONY COUNT: CPT

## 2025-07-20 RX ORDER — CEPHALEXIN 500 MG/1
500 CAPSULE ORAL 2 TIMES DAILY
Qty: 14 CAPSULE | Refills: 0 | Status: SHIPPED | OUTPATIENT
Start: 2025-07-20 | End: 2025-07-27

## 2025-07-20 RX ORDER — KETOROLAC TROMETHAMINE 30 MG/ML
30 INJECTION, SOLUTION INTRAMUSCULAR; INTRAVENOUS ONCE
Status: COMPLETED | OUTPATIENT
Start: 2025-07-20 | End: 2025-07-20

## 2025-07-20 RX ADMIN — KETOROLAC TROMETHAMINE 30 MG: 30 INJECTION, SOLUTION INTRAMUSCULAR at 04:38

## 2025-07-20 NOTE — ED PROVIDER NOTES
distension.      Palpations: Abdomen is soft.      Tenderness: There is no abdominal tenderness.   Musculoskeletal:      Cervical back: Normal range of motion and neck supple.      Right lower leg: No edema.      Left lower leg: No edema.   Skin:     General: Skin is dry.      Coloration: Skin is not pale.   Neurological:      General: No focal deficit present.      Mental Status: She is alert. Mental status is at baseline.           Imaging:  No orders to display       Labs:  Labs Reviewed   URINALYSIS WITH REFLEX TO CULTURE - Abnormal; Notable for the following components:       Result Value    Clarity, UA TURBID (*)     Blood, Urine MODERATE (*)     Protein, UA 30 (*)     Nitrite, Urine POSITIVE (*)     Leukocyte Esterase, Urine LARGE (*)     All other components within normal limits   MICROSCOPIC URINALYSIS - Abnormal; Notable for the following components:    WBC, UA TNTC (*)     RBC, UA 31-50 (*)     Yeast, UA Present (*)     All other components within normal limits   CULTURE, URINE       MDM:  MDM    Edith Blanco is a 65 y.o. female with PMH above who presents to the Emergency Department with uncomplicated UTI sx. VSS afebrile no sepsis. Exam reassuring. UA infectious. Rx keflex. Return prec provided.              Final Impression: See below.    Procedures    1. Dysuria    2. Urinary tract infection without hematuria, site unspecified      DISPOSITION Decision To Discharge 07/20/2025 04:37:14 AM             Rao Hameed MD  [address] [place] [id number]  [phone]            DISCHARGE MEDICATIONS:  Discharge Medication List as of 7/20/2025  4:39 AM        START taking these medications    Details   cephALEXin (KEFLEX) 500 MG capsule Take 1 capsule by mouth 2 times daily for 7 days, Disp-14 capsule, R-0Normal                (Please note that portions of this note were completed with a voice recognition program.  Efforts were made to edit thedictations but occasionally words are  Cheek Interpolation Flap Division And Inset Text: Division and inset of the cheek interpolation flap was performed to achieve optimal aesthetic result, restore normal anatomic appearance and avoid distortion of normal anatomy, expedite and facilitate wound healing, achieve optimal functional result and because linear closure either not possible or would produce suboptimal result. The patient was prepped and draped in the usual manner. The pedicle was infiltrated with local anesthesia. The pedicle was sectioned with a #15 blade. The pedicle was de-bulked and trimmed to match the shape of the defect. Hemostasis was achieved. The flap donor site and free margin of the flap were secured with deep buried sutures and the wound edges were re-approximated.

## 2025-07-20 NOTE — DISCHARGE INSTRUCTIONS
Please return if you have worsening pain, lightheadedness, passing out, inability to tolerate food or water, or other emergent concerns. Otherwise please follow-up with your primary care doctor regarding your ER visit today.

## 2025-07-23 LAB
BACTERIA UR CULT: ABNORMAL
BACTERIA UR CULT: ABNORMAL
ORGANISM: ABNORMAL

## 2025-07-26 ENCOUNTER — HOSPITAL ENCOUNTER (EMERGENCY)
Age: 65
Discharge: HOME | End: 2025-07-26
Attending: EMERGENCY MEDICINE
Payer: MEDICARE

## 2025-07-26 VITALS
BODY MASS INDEX: 29.62 KG/M2 | HEIGHT: 69 IN | WEIGHT: 200 LBS | TEMPERATURE: 98.4 F | OXYGEN SATURATION: 96 % | RESPIRATION RATE: 18 BRPM | HEART RATE: 96 BPM | DIASTOLIC BLOOD PRESSURE: 89 MMHG | SYSTOLIC BLOOD PRESSURE: 146 MMHG

## 2025-07-26 DIAGNOSIS — E86.0 DEHYDRATION: ICD-10-CM

## 2025-07-26 DIAGNOSIS — R30.0 DYSURIA: Primary | ICD-10-CM

## 2025-07-26 LAB
ALBUMIN SERPL-MCNC: 4.1 G/DL (ref 3.5–5.2)
ALP SERPL-CCNC: 116 U/L (ref 35–104)
ALT SERPL-CCNC: 18 U/L (ref 10–35)
ANION GAP SERPL CALCULATED.3IONS-SCNC: 12 MMOL/L (ref 8–16)
AST SERPL-CCNC: 22 U/L (ref 10–35)
BASOPHILS # BLD: 0.1 K/UL (ref 0–0.2)
BASOPHILS NFR BLD: 1.3 % (ref 0–1)
BILIRUB SERPL-MCNC: 0.4 MG/DL (ref 0.2–1.2)
BILIRUB UR QL STRIP: NEGATIVE
BUN SERPL-MCNC: 15 MG/DL (ref 8–23)
CALCIUM SERPL-MCNC: 9.3 MG/DL (ref 8.8–10.2)
CHLORIDE SERPL-SCNC: 107 MMOL/L (ref 98–107)
CLARITY UR: CLEAR
CO2 SERPL-SCNC: 22 MMOL/L (ref 22–29)
COLOR UR: YELLOW
CREAT SERPL-MCNC: 1.5 MG/DL (ref 0.5–0.9)
EOSINOPHIL # BLD: 0.3 K/UL (ref 0–0.6)
EOSINOPHIL NFR BLD: 2.7 % (ref 0–5)
ERYTHROCYTE [DISTWIDTH] IN BLOOD BY AUTOMATED COUNT: 13.4 % (ref 11.5–14.5)
GLUCOSE SERPL-MCNC: 121 MG/DL (ref 70–99)
GLUCOSE UR STRIP.AUTO-MCNC: NEGATIVE MG/DL
HCT VFR BLD AUTO: 40.9 % (ref 37–47)
HGB BLD-MCNC: 13.4 G/DL (ref 12–16)
HGB UR STRIP.AUTO-MCNC: NEGATIVE MG/L
IMM GRANULOCYTES # BLD: 0 K/UL
KETONES UR STRIP.AUTO-MCNC: ABNORMAL MG/DL
LEUKOCYTE ESTERASE UR QL STRIP.AUTO: NEGATIVE
LYMPHOCYTES # BLD: 2.1 K/UL (ref 1.1–4.5)
LYMPHOCYTES NFR BLD: 20.1 % (ref 20–40)
MCH RBC QN AUTO: 27.4 PG (ref 27–31)
MCHC RBC AUTO-ENTMCNC: 32.8 G/DL (ref 33–37)
MCV RBC AUTO: 83.6 FL (ref 81–99)
MONOCYTES # BLD: 0.7 K/UL (ref 0–0.9)
MONOCYTES NFR BLD: 7.1 % (ref 0–10)
NEUTROPHILS # BLD: 7.2 K/UL (ref 1.5–7.5)
NEUTS SEG NFR BLD: 68.4 % (ref 50–65)
NITRITE UR QL STRIP.AUTO: NEGATIVE
PH UR STRIP.AUTO: 5.5 [PH] (ref 5–8)
PLATELET # BLD AUTO: 241 K/UL (ref 130–400)
PMV BLD AUTO: 10.5 FL (ref 9.4–12.3)
POTASSIUM SERPL-SCNC: 4.1 MMOL/L (ref 3.5–5.1)
PROT SERPL-MCNC: 6.8 G/DL (ref 6.4–8.3)
PROT UR STRIP.AUTO-MCNC: NEGATIVE MG/DL
RBC # BLD AUTO: 4.89 M/UL (ref 4.2–5.4)
SODIUM SERPL-SCNC: 141 MMOL/L (ref 136–145)
SP GR UR STRIP.AUTO: 1.03 (ref 1–1.03)
UROBILINOGEN UR STRIP.AUTO-MCNC: 1 E.U./DL
WBC # BLD AUTO: 10.5 K/UL (ref 4.8–10.8)

## 2025-07-26 PROCEDURE — 36415 COLL VENOUS BLD VENIPUNCTURE: CPT

## 2025-07-26 PROCEDURE — 99284 EMERGENCY DEPT VISIT MOD MDM: CPT

## 2025-07-26 PROCEDURE — 2580000003 HC RX 258: Performed by: EMERGENCY MEDICINE

## 2025-07-26 PROCEDURE — 96361 HYDRATE IV INFUSION ADD-ON: CPT

## 2025-07-26 PROCEDURE — 85025 COMPLETE CBC W/AUTO DIFF WBC: CPT

## 2025-07-26 PROCEDURE — 6370000000 HC RX 637 (ALT 250 FOR IP): Performed by: EMERGENCY MEDICINE

## 2025-07-26 PROCEDURE — 81003 URINALYSIS AUTO W/O SCOPE: CPT

## 2025-07-26 PROCEDURE — 2500000003 HC RX 250 WO HCPCS: Performed by: EMERGENCY MEDICINE

## 2025-07-26 PROCEDURE — 96374 THER/PROPH/DIAG INJ IV PUSH: CPT

## 2025-07-26 PROCEDURE — 80053 COMPREHEN METABOLIC PANEL: CPT

## 2025-07-26 PROCEDURE — 6360000002 HC RX W HCPCS: Performed by: EMERGENCY MEDICINE

## 2025-07-26 RX ORDER — 0.9 % SODIUM CHLORIDE 0.9 %
1000 INTRAVENOUS SOLUTION INTRAVENOUS ONCE
Status: COMPLETED | OUTPATIENT
Start: 2025-07-26 | End: 2025-07-26

## 2025-07-26 RX ORDER — PHENAZOPYRIDINE HYDROCHLORIDE 200 MG/1
200 TABLET, FILM COATED ORAL ONCE
Status: COMPLETED | OUTPATIENT
Start: 2025-07-26 | End: 2025-07-26

## 2025-07-26 RX ADMIN — WATER 1000 MG: 1 INJECTION INTRAMUSCULAR; INTRAVENOUS; SUBCUTANEOUS at 19:15

## 2025-07-26 RX ADMIN — PHENAZOPYRIDINE 200 MG: 200 TABLET ORAL at 19:15

## 2025-07-26 RX ADMIN — SODIUM CHLORIDE 1000 ML: 9 INJECTION, SOLUTION INTRAVENOUS at 19:42

## 2025-07-26 ASSESSMENT — ENCOUNTER SYMPTOMS
SHORTNESS OF BREATH: 0
ABDOMINAL PAIN: 0

## 2025-07-26 NOTE — ED PROVIDER NOTES
[facility] EMERGENCY DEPARTMENT  EMERGENCY DEPARTMENT ENCOUNTER      Pt Name: Edith Blanco  MRN: [id number]  Birthdate 1960  Date of evaluation: 7/26/2025  Provider: Olamide Horn DO  6:11 AM    CHIEF COMPLAINT       Chief Complaint   Patient presents with    Dysuria     Dx UTI last week, worsening symptoms          HISTORY OF PRESENT ILLNESS      This is a 65-year-old female history of diabetes and high cholesterol presenting to the emergency department secondary to dysuria.  The patient reports being here approximately a week ago for UTI symptoms.  It was found that the patient had E. coli in her urine based on urine cultures that were pansensitive.  She was started on Keflex.  While taking the medication, she thought that she was feeling well.  She is still taking the antibiotics but her symptoms just recently restarted with the burning with urination.  No complaints of fever, flank pain, hematuria, vomiting, or diarrhea.    The history is provided by the patient.       Nursing Notes were reviewed.    REVIEW OF SYSTEMS       Review of Systems   Constitutional:  Negative for fever.   Respiratory:  Negative for shortness of breath.    Cardiovascular:  Negative for chest pain.   Gastrointestinal:  Negative for abdominal pain.   Genitourinary:  Positive for dysuria. Negative for vaginal bleeding.   All other systems reviewed and are negative.      Except as noted above the remainder of the review of systems was reviewed and negative.       PAST MEDICAL HISTORY     Past Medical History:   Diagnosis Date    Diabetes (HCC)     High cholesterol     Vascular disease          SURGICAL HISTORY       Past Surgical History:   Procedure Laterality Date    VASCULAR SURGERY           CURRENT MEDICATIONS       Discharge Medication List as of 7/26/2025  8:41 PM        CONTINUE these medications which have NOT CHANGED    Details   cephALEXin (KEFLEX) 500 MG capsule Take 1 capsule by mouth 2 times daily for 7 days,

## 2025-08-31 ENCOUNTER — OFFICE VISIT (OUTPATIENT)
Age: 65
End: 2025-08-31

## 2025-08-31 VITALS
HEART RATE: 86 BPM | OXYGEN SATURATION: 94 % | WEIGHT: 222.2 LBS | RESPIRATION RATE: 20 BRPM | DIASTOLIC BLOOD PRESSURE: 74 MMHG | SYSTOLIC BLOOD PRESSURE: 128 MMHG | BODY MASS INDEX: 32.81 KG/M2 | TEMPERATURE: 97 F

## 2025-08-31 DIAGNOSIS — R30.0 DYSURIA: Primary | ICD-10-CM

## 2025-08-31 DIAGNOSIS — R82.90 ABNORMAL URINALYSIS: ICD-10-CM

## 2025-08-31 LAB
APPEARANCE FLUID: ABNORMAL
BILIRUBIN, POC: NEGATIVE
BLOOD URINE, POC: NEGATIVE
CLARITY, POC: ABNORMAL
COLOR, POC: YELLOW
GLUCOSE URINE, POC: NEGATIVE MG/DL
KETONES, POC: NEGATIVE MG/DL
LEUKOCYTE EST, POC: NEGATIVE
NITRITE, POC: POSITIVE
PH, POC: 5.5
PROTEIN, POC: NEGATIVE MG/DL
SPECIFIC GRAVITY, POC: 1.02
UROBILINOGEN, POC: 0.2 MG/DL

## 2025-08-31 RX ORDER — CIPROFLOXACIN 500 MG/1
250 TABLET, FILM COATED ORAL 2 TIMES DAILY
Qty: 3 TABLET | Refills: 0 | Status: SHIPPED | OUTPATIENT
Start: 2025-08-31 | End: 2025-09-03

## 2025-08-31 RX ORDER — FERROUS GLUCONATE 324(38)MG
1 TABLET ORAL
COMMUNITY
Start: 2025-08-25

## 2025-08-31 RX ORDER — FOLIC ACID-PYRIDOXINE-CYANOCOBALAMIN TAB 2.5-25-2 MG 2.5-25-2 MG
1 TAB ORAL DAILY
COMMUNITY
Start: 2025-08-21

## 2025-08-31 RX ORDER — LINACLOTIDE 145 UG/1
CAPSULE, GELATIN COATED ORAL
COMMUNITY
Start: 2025-07-03

## 2025-08-31 RX ORDER — METOPROLOL SUCCINATE 100 MG/1
100 TABLET, EXTENDED RELEASE ORAL DAILY
COMMUNITY

## 2025-08-31 RX ORDER — NITROFURANTOIN 25; 75 MG/1; MG/1
CAPSULE ORAL
COMMUNITY
Start: 2025-08-29

## 2025-09-03 LAB
BACTERIA UR CULT: ABNORMAL
ORGANISM: ABNORMAL
ORGANISM: ABNORMAL

## (undated) DEVICE — FRCP BX RADJAW4 NDL 2.8 240 STD OG

## (undated) DEVICE — MASK,OXYGEN,MED CONC,ADLT,7' TUB, UC: Brand: PENDING

## (undated) DEVICE — CONMED SCOPE SAVER BITE BLOCK, 20X27 MM: Brand: SCOPE SAVER

## (undated) DEVICE — TBG SMPL FLTR LINE NASL 02/C02 A/ BX/100

## (undated) DEVICE — Device: Brand: DEFENDO AIR/WATER/SUCTION AND BIOPSY VALVE

## (undated) DEVICE — THE CHANNEL CLEANING BRUSH IS A NYLON FLEXI BRUSH ATTACHED TO A FLEXIBLE PLASTIC SHEATH DESIGNED TO SAFELY REMOVE DEBRIS FROM FLEXIBLE ENDOSCOPES.

## (undated) DEVICE — ENDOGATOR AUXILIARY WATER JET CONNECTOR: Brand: ENDOGATOR

## (undated) DEVICE — MSK O2 MD CONCENTR A/ LF 7FT 1P/U

## (undated) DEVICE — YANKAUER,BULB TIP WITH VENT: Brand: ARGYLE

## (undated) DEVICE — CUFF,BP,DISP,1 TUBE,ADULT,HP: Brand: MEDLINE

## (undated) DEVICE — THE SINGLE USE ETRAP – POLYP TRAP IS USED FOR SUCTION RETRIEVAL OF ENDOSCOPICALLY REMOVED POLYPS.: Brand: ETRAP

## (undated) DEVICE — SENSR O2 OXIMAX FNGR A/ 18IN NONSTR

## (undated) DEVICE — Device: Brand: SINGLE USE ELECTROSURGICAL SNARE SD-400

## (undated) DEVICE — SNAR POLYP SENSATION MICRO OVL 13 240X40